# Patient Record
Sex: FEMALE | Race: AMERICAN INDIAN OR ALASKA NATIVE | NOT HISPANIC OR LATINO | Employment: UNEMPLOYED | URBAN - METROPOLITAN AREA
[De-identification: names, ages, dates, MRNs, and addresses within clinical notes are randomized per-mention and may not be internally consistent; named-entity substitution may affect disease eponyms.]

---

## 2017-01-08 ENCOUNTER — HOSPITAL ENCOUNTER (OUTPATIENT)
Facility: MEDICAL CENTER | Age: 32
End: 2017-01-08
Attending: OBSTETRICS & GYNECOLOGY | Admitting: OBSTETRICS & GYNECOLOGY
Payer: MEDICAID

## 2017-01-08 VITALS
SYSTOLIC BLOOD PRESSURE: 129 MMHG | HEART RATE: 82 BPM | WEIGHT: 203 LBS | BODY MASS INDEX: 30.77 KG/M2 | DIASTOLIC BLOOD PRESSURE: 67 MMHG | TEMPERATURE: 98 F | HEIGHT: 68 IN

## 2017-01-08 LAB — A1 MICROGLOB PLACENTAL VAG QL: NEGATIVE

## 2017-01-08 PROCEDURE — 59025 FETAL NON-STRESS TEST: CPT | Performed by: OBSTETRICS & GYNECOLOGY

## 2017-01-08 PROCEDURE — 84112 EVAL AMNIOTIC FLUID PROTEIN: CPT

## 2017-01-08 NOTE — PROGRESS NOTES
0024 32y/o  edc 17, EGA 36 , Here to l&d room LDA 3 with family. C/O ROM. EFM/TOCO applied, patients states positive fetal movement. Denies vaginal bleeding. Sterile vaginal exam performed, amnisure sent, SVE 1/thick, posterior.   0158 Dr Wright updated and orders received for discharge  0200 discharge orders received  0208 patient off floor stable on feet with family.

## 2017-01-20 ENCOUNTER — HOSPITAL ENCOUNTER (INPATIENT)
Facility: MEDICAL CENTER | Age: 32
LOS: 2 days | End: 2017-01-22
Attending: OBSTETRICS & GYNECOLOGY | Admitting: OBSTETRICS & GYNECOLOGY
Payer: COMMERCIAL

## 2017-01-20 LAB
BASOPHILS # BLD AUTO: 0.2 % (ref 0–1.8)
BASOPHILS # BLD: 0.02 K/UL (ref 0–0.12)
EOSINOPHIL # BLD AUTO: 0.12 K/UL (ref 0–0.51)
EOSINOPHIL NFR BLD: 1.4 % (ref 0–6.9)
ERYTHROCYTE [DISTWIDTH] IN BLOOD BY AUTOMATED COUNT: 45 FL (ref 35.9–50)
HCT VFR BLD AUTO: 38.3 % (ref 37–47)
HGB BLD-MCNC: 12.7 G/DL (ref 12–16)
HOLDING TUBE BB 8507: NORMAL
IMM GRANULOCYTES # BLD AUTO: 0.07 K/UL (ref 0–0.11)
IMM GRANULOCYTES NFR BLD AUTO: 0.8 % (ref 0–0.9)
LYMPHOCYTES # BLD AUTO: 2.51 K/UL (ref 1–4.8)
LYMPHOCYTES NFR BLD: 29.6 % (ref 22–41)
MCH RBC QN AUTO: 29.4 PG (ref 27–33)
MCHC RBC AUTO-ENTMCNC: 33.2 G/DL (ref 33.6–35)
MCV RBC AUTO: 88.7 FL (ref 81.4–97.8)
MONOCYTES # BLD AUTO: 0.57 K/UL (ref 0–0.85)
MONOCYTES NFR BLD AUTO: 6.7 % (ref 0–13.4)
NEUTROPHILS # BLD AUTO: 5.2 K/UL (ref 2–7.15)
NEUTROPHILS NFR BLD: 61.3 % (ref 44–72)
NRBC # BLD AUTO: 0 K/UL
NRBC BLD AUTO-RTO: 0 /100 WBC
PLATELET # BLD AUTO: 191 K/UL (ref 164–446)
PMV BLD AUTO: 10.7 FL (ref 9–12.9)
RBC # BLD AUTO: 4.32 M/UL (ref 4.2–5.4)
WBC # BLD AUTO: 8.5 K/UL (ref 4.8–10.8)

## 2017-01-20 PROCEDURE — 700101 HCHG RX REV CODE 250

## 2017-01-20 PROCEDURE — 303615 HCHG EPIDURAL/SPINAL ANESTHESIA FOR LABOR

## 2017-01-20 PROCEDURE — 700111 HCHG RX REV CODE 636 W/ 250 OVERRIDE (IP)

## 2017-01-20 PROCEDURE — 304965 HCHG RECOVERY SERVICES

## 2017-01-20 PROCEDURE — 10907ZC DRAINAGE OF AMNIOTIC FLUID, THERAPEUTIC FROM PRODUCTS OF CONCEPTION, VIA NATURAL OR ARTIFICIAL OPENING: ICD-10-PCS | Performed by: OBSTETRICS & GYNECOLOGY

## 2017-01-20 PROCEDURE — 10H07YZ INSERTION OF OTHER DEVICE INTO PRODUCTS OF CONCEPTION, VIA NATURAL OR ARTIFICIAL OPENING: ICD-10-PCS | Performed by: OBSTETRICS & GYNECOLOGY

## 2017-01-20 PROCEDURE — 59409 OBSTETRICAL CARE: CPT

## 2017-01-20 PROCEDURE — 85025 COMPLETE CBC W/AUTO DIFF WBC: CPT

## 2017-01-20 PROCEDURE — 700102 HCHG RX REV CODE 250 W/ 637 OVERRIDE(OP)

## 2017-01-20 PROCEDURE — 3E033VJ INTRODUCTION OF OTHER HORMONE INTO PERIPHERAL VEIN, PERCUTANEOUS APPROACH: ICD-10-PCS | Performed by: OBSTETRICS & GYNECOLOGY

## 2017-01-20 PROCEDURE — 770007 HCHG ROOM/CARE - OB SEMI PRIVATE (*

## 2017-01-20 PROCEDURE — 700111 HCHG RX REV CODE 636 W/ 250 OVERRIDE (IP): Performed by: OBSTETRICS & GYNECOLOGY

## 2017-01-20 PROCEDURE — A9270 NON-COVERED ITEM OR SERVICE: HCPCS

## 2017-01-20 PROCEDURE — 36415 COLL VENOUS BLD VENIPUNCTURE: CPT

## 2017-01-20 RX ORDER — MISOPROSTOL 200 UG/1
TABLET ORAL
Status: COMPLETED
Start: 2017-01-20 | End: 2017-01-20

## 2017-01-20 RX ORDER — ONDANSETRON 2 MG/ML
4 INJECTION INTRAMUSCULAR; INTRAVENOUS EVERY 6 HOURS PRN
Status: DISCONTINUED | OUTPATIENT
Start: 2017-01-20 | End: 2017-01-21

## 2017-01-20 RX ORDER — ROPIVACAINE HYDROCHLORIDE 2 MG/ML
INJECTION, SOLUTION EPIDURAL; INFILTRATION; PERINEURAL
Status: COMPLETED
Start: 2017-01-20 | End: 2017-01-20

## 2017-01-20 RX ORDER — MISOPROSTOL 200 UG/1
600 TABLET ORAL
Status: COMPLETED | OUTPATIENT
Start: 2017-01-20 | End: 2017-01-20

## 2017-01-20 RX ORDER — SODIUM CHLORIDE, SODIUM LACTATE, POTASSIUM CHLORIDE, CALCIUM CHLORIDE 600; 310; 30; 20 MG/100ML; MG/100ML; MG/100ML; MG/100ML
INJECTION, SOLUTION INTRAVENOUS CONTINUOUS
Status: DISPENSED | OUTPATIENT
Start: 2017-01-20 | End: 2017-01-20

## 2017-01-20 RX ORDER — ONDANSETRON 4 MG/1
4 TABLET, ORALLY DISINTEGRATING ORAL EVERY 6 HOURS PRN
Status: DISCONTINUED | OUTPATIENT
Start: 2017-01-20 | End: 2017-01-21

## 2017-01-20 RX ORDER — DEXTROSE, SODIUM CHLORIDE, SODIUM LACTATE, POTASSIUM CHLORIDE, AND CALCIUM CHLORIDE 5; .6; .31; .03; .02 G/100ML; G/100ML; G/100ML; G/100ML; G/100ML
INJECTION, SOLUTION INTRAVENOUS CONTINUOUS
Status: DISCONTINUED | OUTPATIENT
Start: 2017-01-21 | End: 2017-01-21

## 2017-01-20 RX ORDER — HYDROCODONE BITARTRATE AND ACETAMINOPHEN 5; 325 MG/1; MG/1
1 TABLET ORAL EVERY 4 HOURS PRN
Status: DISCONTINUED | OUTPATIENT
Start: 2017-01-20 | End: 2017-01-21

## 2017-01-20 RX ORDER — IBUPROFEN 600 MG/1
600 TABLET ORAL EVERY 6 HOURS PRN
Status: DISCONTINUED | OUTPATIENT
Start: 2017-01-20 | End: 2017-01-21

## 2017-01-20 RX ORDER — HYDROCODONE BITARTRATE AND ACETAMINOPHEN 10; 325 MG/1; MG/1
1 TABLET ORAL EVERY 4 HOURS PRN
Status: DISCONTINUED | OUTPATIENT
Start: 2017-01-20 | End: 2017-01-21

## 2017-01-20 RX ORDER — ACETAMINOPHEN 325 MG/1
325 TABLET ORAL EVERY 4 HOURS PRN
Status: DISCONTINUED | OUTPATIENT
Start: 2017-01-20 | End: 2017-01-21

## 2017-01-20 RX ADMIN — MISOPROSTOL 800 MCG: 200 TABLET ORAL at 23:00

## 2017-01-20 RX ADMIN — FENTANYL CITRATE 100 MCG: 50 INJECTION, SOLUTION INTRAMUSCULAR; INTRAVENOUS at 16:36

## 2017-01-20 RX ADMIN — Medication 125 ML/HR: at 23:20

## 2017-01-20 RX ADMIN — SODIUM CHLORIDE, SODIUM LACTATE, POTASSIUM CHLORIDE, CALCIUM CHLORIDE AND DEXTROSE MONOHYDRATE: 5; 600; 310; 30; 20 INJECTION, SOLUTION INTRAVENOUS at 15:57

## 2017-01-20 RX ADMIN — SODIUM CHLORIDE, POTASSIUM CHLORIDE, SODIUM LACTATE AND CALCIUM CHLORIDE: 600; 310; 30; 20 INJECTION, SOLUTION INTRAVENOUS at 16:31

## 2017-01-20 RX ADMIN — Medication 2000 ML/HR: at 22:45

## 2017-01-20 RX ADMIN — ROPIVACAINE HYDROCHLORIDE 200 MG: 2 INJECTION, SOLUTION EPIDURAL; INFILTRATION at 17:15

## 2017-01-20 RX ADMIN — SODIUM CHLORIDE, POTASSIUM CHLORIDE, SODIUM LACTATE AND CALCIUM CHLORIDE 1000 ML: 600; 310; 30; 20 INJECTION, SOLUTION INTRAVENOUS at 06:14

## 2017-01-20 RX ADMIN — FENTANYL CITRATE 100 MCG: 50 INJECTION, SOLUTION INTRAMUSCULAR; INTRAVENOUS at 15:10

## 2017-01-20 RX ADMIN — Medication 2 MILLI-UNITS/MIN: at 06:30

## 2017-01-20 RX ADMIN — SODIUM CHLORIDE, POTASSIUM CHLORIDE, SODIUM LACTATE AND CALCIUM CHLORIDE: 600; 310; 30; 20 INJECTION, SOLUTION INTRAVENOUS at 14:03

## 2017-01-20 ASSESSMENT — LIFESTYLE VARIABLES
EVER_SMOKED: YES
DO YOU DRINK ALCOHOL: NO
ALCOHOL_USE: NO

## 2017-01-20 NOTE — IP AVS SNAPSHOT
1/22/2017          Karin Richard  [address]  [place] [address]    Dear Karin:    [facility] wants to ensure your discharge home is safe and you or your loved ones have had all your questions answered regarding your care after you leave the hospital.    You may receive a telephone call within two days of your discharge.  This call is to make certain you understand your discharge instructions as well as ensure we provided you with the best care possible during your stay with us.     The call will only last approximately 3-5 minutes and will be done by a nurse.    Once again, we want to ensure your discharge home is safe and that you have a clear understanding of any next steps in your care.  If you have any questions or concerns, please do not hesitate to contact us, we are here for you.  Thank you for choosing [facility] for your healthcare needs.    Sincerely,    Silvestre Chiu    [facility]

## 2017-01-20 NOTE — IP AVS SNAPSHOT
Aston Club Access Code: [email or web address]  Expires: 2/10/2017  3:29 PM    Aston Club  A secure, online tool to manage your health information     [employer]’s Aston Club® is a secure, online tool that connects you to your personalized health information from the privacy of your home -- day or night - making it very easy for you to manage your healthcare. Once the activation process is completed, you can even access your medical information using the Aston Club demar, which is available for free in the Apple Demar store or Google Play store.     Aston Club provides the following levels of access (as shown below):   My Chart Features   [facility] Primary Care Doctor [facility]  Specialists [facility]  Urgent  Care Non-[facility]  Primary Care  Doctor   Email your healthcare team securely and privately 24/7 X X X X   Manage appointments: schedule your next appointment; view details of past/upcoming appointments X      Request prescription refills. X      View recent personal medical records, including lab and immunizations X X X X   View health record, including health history, allergies, medications X X X X   Read reports about your outpatient visits, procedures, consult and ER notes X X X X   See your discharge summary, which is a recap of your hospital and/or ER visit that includes your diagnosis, lab results, and care plan. X X       How to register for Aston Club:  1. Go to  [email or web address].  2. Click on the Sign Up Now box, which takes you to the New Member Sign Up page. You will need to provide the following information:  a. Enter your Aston Club Access Code exactly as it appears at the top of this page. (You will not need to use this code after you’ve completed the sign-up process. If you do not sign up before the expiration date, you must request a new code.)   b. Enter your date of birth.   c. Enter your home email address.   d. Click Submit, and follow the next screen’s instructions.  3. Create a Aston Club ID. This will be your [employer]  login ID and cannot be changed, so think of one that is secure and easy to remember.  4. Create a abeo password. You can change your password at any time.  5. Enter your Password Reset Question and Answer. This can be used at a later time if you forget your password.   6. Enter your e-mail address. This allows you to receive e-mail notifications when new information is available in abeo.  7. Click Sign Up. You can now view your health information.    For assistance activating your abeo account, call [phone]

## 2017-01-20 NOTE — PROGRESS NOTES
0545: Pt presents to L&D for IOL.  External monitors x2 applied.  SVE 2/50/-2  0615: IV started, labs drawn and sent.    0655: Report given to Carli, RN and Kim student RN at bedside.

## 2017-01-20 NOTE — H&P
Department of Obstetrics and Gynecology  Labor and Delivery History and Physical    Date of Admission: 2017     ID: 32 y.o.  with IUP at 38+2.    Primary OB: Buck Wright M.D.     Attending OB: Buck Wright M.D.    CC: IOL for oligohydramnios    HPI: Karin Richard is a 32 y.o.  at 38+2 on date of admission by 10 week ultrasound, who presents for IOL for oligohydramnios.  She was found yesterday to have a 10/10 BPP, but was found to have an JACKLYN that was 4-6 cm.  Given the that some measurements are consistent with oligohydramnios, it was recommended that she move forward with IOL.  She was started on oxytocin earlier this am and is starting to feel CTX regularlly    ROS: 10 systems reviewed and negative except as noted above.    Obstetric History      x 4.  Pt developed kidney dz with her second delivery that resolved thereafter.          Past Medical History  Surgical History   Negative except for Gyn as below   LEEP      Gynecologic History  Social History   Irregular menses prior to pregnancy  + Hx of abnormal pap smears with cervical dysplasia requiring LEEP.  Pap and HPV negative at the beginning of this pregnancy.  + Hx of STIs: chlamydia, gonorrhea, & HPV Tobacco: denies  EtOH: denies  Street Drugs: denies      Medications  Allergies   No current facility-administered medications on file prior to encounter.     Current Outpatient Prescriptions on File Prior to Encounter   Medication Sig Dispense Refill   • hydrocodone-acetaminophen (NORCO) 5-325 MG Tab per tablet Take 1 Tab by mouth every 6 hours as needed. 16 Tab 0   • mupirocin (BACTROBAN) 2 % Ointment Apply to the affecte area twice daily for 7 days. 1 Tube 0   • Prenatal MV-Min-Fe Fum-FA-DHA (PRENATAL 1 PO) Take  by mouth.     • cyclobenzaprine (FLEXERIL) 10 MG TABS Take 10 mg by mouth 3 times a day as needed.     • ibuprofen (MOTRIN) 600 MG TABS Take 600 mg by mouth every 6 hours as needed.     •  "oxycodone-acetaminophen (PERCOCET) 5-325 MG TABS Take 1-2 Tabs by mouth every 6 hours as needed (pain). 20 Each 0   • lorazepam (ATIVAN) 0.5 MG TABS Take 1 Tab by mouth every four hours as needed (muscle spasms). 20 Tab 0   • amoxicillin (AMOXIL) 500 MG CAPS Take 500 mg by mouth 3 times a day.     • Non Formulary Request Depoprovera injection every 3 months      Nkda       O: /74 mmHg  Pulse 78  Temp(Src) 36.4 °C (97.5 °F) (Temporal)  Ht 1.702 m (5' 7\")  Wt 102.513 kg (226 lb)  BMI 35.39 kg/m2  LMP 2014    Gen: NAD, AAO  Abd: Gravid, NTTP,Cephalic by [place], No rebound or guarding  Ext: NTTP, no edema, 2+DPP  Pelvic: SVE 2/50/-2, AROM thin meconium    FHT:  125/mod allyson/+accels  [place]: CTX q3min    Labs:   CBC    2017 06:15   WBC 8.5   RBC 4.32   Hemoglobin 12.7   Hematocrit 38.3   MCV 88.7   MCH 29.4   MCHC 33.2 (L)   RDW 45.0   Platelet Count 191     Prenatal labs:  Rh+, ABS negative, RubImm, HBsAg, HIV NR, RPR NR, VarImm.  First trimester screen negative.  AFP negative. 1h glucose 174, abn.  1 abnormal value of 3h GTT.  GBS neg.          A/P: Karin Richard is a 32 y.o.  at 38+2 by 10wk U/S who presents with for IOL for oligohydramnios.  AVSS.  Cat I FHT.  *Admit to L&D  *IV, CBC, T&S on hold  *Oligohydramnios/IOL: pt with JACKLYN 4-6 on U/S.  IOL for same.  Reassuring FHT currently.  CTX regularly with oxytocin, AROM now for light meconium.    *FWB: Reassuring, reactive, Cat I FHT.  CEFM.    *Pain: fentanyl and epidural if decided  *Global: Rh+, RubImm, VarImm, GBS neg.    - Annette Wright M.D.,  2017      ADDENDUM:   Fetus was seen to have mild LEFT pyelectasis on last U/S yesterday was 7.4mm (normal up to 7mm).  A  renal ultrasound is recommended.     Buck Wright MD, MS,  2017, 10:58 PM    "

## 2017-01-20 NOTE — IP AVS SNAPSHOT
After Visit Summary                                                                                                                Karin Richard   MRN: [id number]    Department:  POST PARTUM 31   2017           Follow-up Information     1. Follow up with Buck Wright M.D.. Schedule an appointment as soon as possible for a visit in 6 weeks.    Specialty:  OB/Gyn    Why:  Follow up in 6 weeks    Contact information    [address]  [place] [address]  [phone]         I assume responsibility for securing a follow-up [place] Screening blood test on my baby within the specified date range.    -                  Discharge Instructions       POSTPARTUM DISCHARGE INSTRUCTIONS FOR MOM    YOB: 1985   Age: 32 y.o.               Admit Date: 2017     Discharge Date: 2017  Attending Doctor:  Buck Wright M.D.                  Allergies:  Nkda    Discharged to home by car. Discharged via wheelchair, hospital escort: Yes.  Special equipment needed: Not Applicable  Belongings with: Personal  Be sure to schedule a follow-up appointment with your primary care doctor or any specialists as instructed.     Discharge Plan:   Influenza Vaccine Indication: Not indicated: Previously immunized this influenza season and > 8 years of age    REASONS TO CALL YOUR OBSTETRICIAN:  1.   Persistent fever or shaking chills (Temperature higher than 100.4)  2.   Heavy bleeding (soaking more than 1 pad per hour); Passing clots  3.   Foul odor from vagina  4.   Mastitis (Breast infection; breast pain, chills, fever, redness)  5.   Urinary pain, burning or frequency  6.   Episiotomy infection  7.   Abdominal incision infection  8.   Severe depression longer than 24 hours    HAND WASHING  · Prior to handling the baby.  · Before breastfeeding or bottle feeding baby.  · After using the bathroom or changing the baby's diaper.    WOUND CARE  Ask your physician for additional care instructions.  In  "general:    ·  Incision:      · Keep clean and dry.    · Do NOT lift anything heavier than your baby for up to 6 weeks.    · There should not be any opening or pus.      VAGINAL CARE  · Nothing inside vagina for 6 weeks: no sexual intercourse, tampons or douching.  · Bleeding may continue for 2-4 weeks.  Amount may vary.    · Call your physician for heavy bleeding which means soaking more than 1 pad per hour    BIRTH CONTROL  · It is possible to become pregnant at any time after delivery and while breastfeeding.  · Plan to discuss a method of birth control with your physician at your follow up visit. visit.    DIET AND ELIMINATION  · Eating more fiber (bran cereal, fruits, and vegetables) and drinking plenty of fluids will help to avoid constipation.  · Urinary frequency after childbirth is normal.    POSTPARTUM BLUES  During the first few days after birth, you may experience a sense of the \"blues\" which may include impatience, irritability or even crying.  These feeling come and go quickly.  However, as many as 1 in 10 women experience emotional symptoms known as postpartum depression.    Postpartum depression:  May start as early as the second or third day after delivery or take several weeks or months to develop.  Symptoms of \"blues\" are present, but are more intense:  Crying spells; loss of appetite; feelings of hopelessness or loss of control; fear of touching the baby; over concern or no concern at all about the baby; little or no concern about your own appearance/caring for yourself; and/or inability to sleep or excessive sleeping.  Contact your physician if you are experiencing any of these symptoms.    Crisis Hotline:  · [facility] Crisis Hotline:  7-442-QRZCTBA  Or 1-902.727.5564  · [place] Crisis Hotline:  1-916.468.9132  Or 342-774-8215    PREVENTING SHAKEN BABY:  If you are angry or stressed, PUT THE BABY IN THE CRIB, step away, take some deep breaths, and wait until you are calm to care for the " "baby.  DO NOT SHAKE THE BABY.  You are not alone, call a supporter for help.    · Crisis Call Center 24/7 crisis line 018-102-3426 or 1-334.769.8199  · You can also text them, text \"ANSWER\" to 392661    QUIT SMOKING/TOBACCO USE:  I understand the use of any tobacco products increases my chance of suffering from future heart disease and could cause other illnesses which may shorten my life. Quitting the use of tobacco products is the single most important thing I can do to improve my health. For further information on smoking / tobacco cessation call a Toll Free Quit Line at 1-933.492.2492 (*National Cancer Terral) or 1-811.923.4506 (American Lung Association) or you can access the web based program at www.lungusa.org.    · [place] Tobacco Users Help Line:  (807) 344-9589       Toll Free: 1-808.829.4723  · Quit Tobacco Program [facility] (021)544-3156    DEPRESSION / SUICIDE RISK:  As you are discharged from this [facility], it is important to learn how to keep safe from harming yourself.    Recognize the warning signs:  · Abrupt changes in personality, positive or negative- including increase in energy   · Giving away possessions  · Change in eating patterns- significant weight changes-  positive or negative  · Change in sleeping patterns- unable to sleep or sleeping all the time   · Unwillingness or inability to communicate  · Depression  · Unusual sadness, discouragement and loneliness  · Talk of wanting to die  · Neglect of personal appearance   · Rebelliousness- reckless behavior  · Withdrawal from people/activities they love  · Confusion- inability to concentrate     If you or a loved one observes any of these behaviors or has concerns about self-harm, here's what you can do:  · Talk about it- your feelings and reasons for harming yourself  · Remove any means that you might use to hurt yourself (examples: pills, rope, extension cords, firearm)  · Get professional help from " the community (Mental Health, Substance Abuse, psychological counseling)  · Do not be alone:Call your Safe Contact- someone whom you trust who will be there for you.  · Call your local CRISIS HOTLINE 059-2965 or 706-100-4119  · Call your local Children's Mobile Crisis Response Team [place] (412) 748-9099 or www.Qazzow  · Call the toll free National Suicide Prevention Hotlines   · National Suicide Prevention Lifeline 053-151-BVDK (7838)  · National Hope Line Network 800-SUICIDE (424-6872)    DISCHARGE SURVEY:  Thank you for choosing [facility].  We hope we provided you with very good care.  You may be receiving a survey in the mail.  Please fill it out.  Your opinion is valuable to us.    ADDITIONAL EDUCATIONAL MATERIALS GIVEN TO PATIENT:        My signature on this form indicates that:  1.  I have reviewed and understand the above information  2.  My questions regarding this information have been answered to my satisfaction.  3.  I have formulated a plan with my discharge nurse to obtain my prescribed medication for home.         Discharge Medication Instructions:    Below are the medications your physician expects you to take upon discharge:    Review all your home medications and newly ordered medications with your doctor and/or pharmacist. Follow medication instructions as directed by your doctor and/or pharmacist.    Please keep your medication list with you and share with your physician.               Medication List      CHANGE how you take these medications        Instructions    hydrocodone-acetaminophen 5-325 MG Tabs per tablet   What changed:  how much to take   Last time this was given:  1 Tab on 1/22/2017  7:19 AM   Commonly known as:  NORCO    Take 1-2 Tabs by mouth every 6 hours as needed.   Dose:  1-2 Tab       ibuprofen 600 MG Tabs   What changed:  reasons to take this   Last time this was given:  600 mg on 1/22/2017  7:20 AM   Commonly known as:  MOTRIN    Take 1 Tab by mouth  every 6 hours as needed (Cramping).   Dose:  600 mg         CONTINUE taking these medications        Instructions    PRENATAL 1 PO    Take  by mouth.         STOP taking these medications     amoxicillin 500 MG Caps   Commonly known as:  AMOXIL       cyclobenzaprine 10 MG Tabs   Commonly known as:  FLEXERIL       lorazepam 0.5 MG Tabs   Commonly known as:  ATIVAN       mupirocin 2 % Oint   Commonly known as:  BACTROBAN       Non Formulary Request       oxycodone-acetaminophen 5-325 MG Tabs   Commonly known as:  PERCOCET               Crisis Hotline:     [place] Crisis Hotline:  6-641-FXRVKEN or 1-562.553.9342    [place] Crisis Hotline:    1-945.681.2751 or 174-754-0074        Disclaimer           _____________________________________                     __________       ________       Patient/Mother Signature or Legal                          Date                   Time

## 2017-01-21 LAB
ERYTHROCYTE [DISTWIDTH] IN BLOOD BY AUTOMATED COUNT: 45.9 FL (ref 35.9–50)
HCT VFR BLD AUTO: 33.8 % (ref 37–47)
HGB BLD-MCNC: 11.5 G/DL (ref 12–16)
MCH RBC QN AUTO: 30.4 PG (ref 27–33)
MCHC RBC AUTO-ENTMCNC: 34 G/DL (ref 33.6–35)
MCV RBC AUTO: 89.4 FL (ref 81.4–97.8)
PLATELET # BLD AUTO: 146 K/UL (ref 164–446)
PMV BLD AUTO: 10.3 FL (ref 9–12.9)
RBC # BLD AUTO: 3.78 M/UL (ref 4.2–5.4)
WBC # BLD AUTO: 13.6 K/UL (ref 4.8–10.8)

## 2017-01-21 PROCEDURE — 700111 HCHG RX REV CODE 636 W/ 250 OVERRIDE (IP): Performed by: OBSTETRICS & GYNECOLOGY

## 2017-01-21 PROCEDURE — 770007 HCHG ROOM/CARE - OB SEMI PRIVATE (*

## 2017-01-21 PROCEDURE — 36415 COLL VENOUS BLD VENIPUNCTURE: CPT

## 2017-01-21 PROCEDURE — 85027 COMPLETE CBC AUTOMATED: CPT

## 2017-01-21 PROCEDURE — 700102 HCHG RX REV CODE 250 W/ 637 OVERRIDE(OP): Performed by: OBSTETRICS & GYNECOLOGY

## 2017-01-21 PROCEDURE — A9270 NON-COVERED ITEM OR SERVICE: HCPCS | Performed by: OBSTETRICS & GYNECOLOGY

## 2017-01-21 RX ORDER — ONDANSETRON 2 MG/ML
4 INJECTION INTRAMUSCULAR; INTRAVENOUS EVERY 6 HOURS PRN
Status: DISCONTINUED | OUTPATIENT
Start: 2017-01-21 | End: 2017-01-22 | Stop reason: HOSPADM

## 2017-01-21 RX ORDER — DOCUSATE SODIUM 100 MG/1
100 CAPSULE, LIQUID FILLED ORAL 2 TIMES DAILY PRN
Status: DISCONTINUED | OUTPATIENT
Start: 2017-01-21 | End: 2017-01-22 | Stop reason: HOSPADM

## 2017-01-21 RX ORDER — HYDROCODONE BITARTRATE AND ACETAMINOPHEN 5; 325 MG/1; MG/1
2 TABLET ORAL EVERY 4 HOURS PRN
Status: DISCONTINUED | OUTPATIENT
Start: 2017-01-21 | End: 2017-01-22 | Stop reason: HOSPADM

## 2017-01-21 RX ORDER — HYDROCODONE BITARTRATE AND ACETAMINOPHEN 5; 325 MG/1; MG/1
1 TABLET ORAL EVERY 4 HOURS PRN
Status: DISCONTINUED | OUTPATIENT
Start: 2017-01-21 | End: 2017-01-22 | Stop reason: HOSPADM

## 2017-01-21 RX ORDER — IBUPROFEN 600 MG/1
600 TABLET ORAL EVERY 6 HOURS PRN
Status: DISCONTINUED | OUTPATIENT
Start: 2017-01-21 | End: 2017-01-22 | Stop reason: HOSPADM

## 2017-01-21 RX ORDER — ACETAMINOPHEN 325 MG/1
650 TABLET ORAL EVERY 4 HOURS PRN
Status: DISCONTINUED | OUTPATIENT
Start: 2017-01-21 | End: 2017-01-22 | Stop reason: HOSPADM

## 2017-01-21 RX ORDER — BISACODYL 10 MG
10 SUPPOSITORY, RECTAL RECTAL PRN
Status: DISCONTINUED | OUTPATIENT
Start: 2017-01-21 | End: 2017-01-22 | Stop reason: HOSPADM

## 2017-01-21 RX ORDER — MISOPROSTOL 200 UG/1
800 TABLET ORAL PRN
Status: DISCONTINUED | OUTPATIENT
Start: 2017-01-21 | End: 2017-01-22 | Stop reason: HOSPADM

## 2017-01-21 RX ORDER — ACETAMINOPHEN 325 MG/1
325 TABLET ORAL EVERY 4 HOURS PRN
Status: DISCONTINUED | OUTPATIENT
Start: 2017-01-21 | End: 2017-01-22 | Stop reason: HOSPADM

## 2017-01-21 RX ORDER — VITAMIN A ACETATE, BETA CAROTENE, ASCORBIC ACID, CHOLECALCIFEROL, .ALPHA.-TOCOPHEROL ACETATE, DL-, THIAMINE MONONITRATE, RIBOFLAVIN, NIACINAMIDE, PYRIDOXINE HYDROCHLORIDE, FOLIC ACID, CYANOCOBALAMIN, CALCIUM CARBONATE, FERROUS FUMARATE, ZINC OXIDE, CUPRIC OXIDE 3080; 12; 120; 400; 1; 1.84; 3; 20; 22; 920; 25; 200; 27; 10; 2 [IU]/1; UG/1; MG/1; [IU]/1; MG/1; MG/1; MG/1; MG/1; MG/1; [IU]/1; MG/1; MG/1; MG/1; MG/1; MG/1
1 TABLET, FILM COATED ORAL EVERY MORNING
Status: DISCONTINUED | OUTPATIENT
Start: 2017-01-22 | End: 2017-01-22 | Stop reason: HOSPADM

## 2017-01-21 RX ADMIN — IBUPROFEN 600 MG: 600 TABLET, FILM COATED ORAL at 14:17

## 2017-01-21 RX ADMIN — IBUPROFEN 600 MG: 600 TABLET, FILM COATED ORAL at 00:20

## 2017-01-21 RX ADMIN — HYDROCODONE BITARTRATE AND ACETAMINOPHEN 1 TABLET: 5; 325 TABLET ORAL at 13:06

## 2017-01-21 RX ADMIN — HYDROCODONE BITARTRATE AND ACETAMINOPHEN 1 TABLET: 5; 325 TABLET ORAL at 17:15

## 2017-01-21 RX ADMIN — IBUPROFEN 600 MG: 600 TABLET, FILM COATED ORAL at 08:00

## 2017-01-21 RX ADMIN — HYDROCODONE BITARTRATE AND ACETAMINOPHEN 1 TABLET: 5; 325 TABLET ORAL at 01:31

## 2017-01-21 RX ADMIN — HYDROCODONE BITARTRATE AND ACETAMINOPHEN 1 TABLET: 5; 325 TABLET ORAL at 08:00

## 2017-01-21 RX ADMIN — IBUPROFEN 600 MG: 600 TABLET, FILM COATED ORAL at 20:57

## 2017-01-21 RX ADMIN — Medication 125 ML/HR: at 00:20

## 2017-01-21 RX ADMIN — HYDROCODONE BITARTRATE AND ACETAMINOPHEN 1 TABLET: 5; 325 TABLET ORAL at 20:57

## 2017-01-21 ASSESSMENT — PAIN SCALES - GENERAL
PAINLEVEL_OUTOF10: 2
PAINLEVEL_OUTOF10: 5
PAINLEVEL_OUTOF10: 3
PAINLEVEL_OUTOF10: 5
PAINLEVEL_OUTOF10: 0
PAINLEVEL_OUTOF10: 1
PAINLEVEL_OUTOF10: 5
PAINLEVEL_OUTOF10: 4
PAINLEVEL_OUTOF10: 1
PAINLEVEL_OUTOF10: 4

## 2017-01-21 NOTE — CARE PLAN
Problem: Pain  Goal: Alleviation of Pain or a reduction in pain to the patient’s comfort goal  Outcome: PROGRESSING AS EXPECTED  Reports adequate pain relief with epidural analgesia.     Problem: Risk for Fluid Imbalance  Goal: Promotion of Fluid Balance  Outcome: PROGRESSING AS EXPECTED  VSS, no s/s of infection noted upon assessment.

## 2017-01-21 NOTE — CARE PLAN
Problem: Altered physiologic condition related to immediate post-delivery state and potential for bleeding/hemorrhage  Goal: Patient physiologically stable as evidenced by normal lochia, palpable uterine involution and vital signs within normal limits  Outcome: PROGRESSING AS EXPECTED  Assessment WNL. VSS.     Problem: Alteration in comfort related to episiotomy, vaginal repair and/or after birth pains  Goal: Patient is able to ambulate, care for self and infant  Outcome: PROGRESSING AS EXPECTED  Pt cites adequate relief of pain with pain medication provided. Will continue to monitor for pain throughout this noc shift. Pt will call for pain medication. Pt verbalizes understanding.

## 2017-01-21 NOTE — PROGRESS NOTES
Dr. Wright notified regarding pt's temp of 100.7 MD ordered to recheck temp one hour after initial temp was taken. Call MD back if pt continues to be febrile.

## 2017-01-21 NOTE — PROGRESS NOTES
"Department of Obstetrics and Gynecology  Labor and Delivery Progress Note    ID: 32 y.o.  at 38+2, oligohydramnios    S: Pt feeling more uncomfortable with CTX.      O: /65 mmHg  Pulse 74  Temp(Src) 36.8 °C (98.3 °F) (Temporal)  Resp 18  Ht 1.702 m (5' 7\")  Wt 102.513 kg (226 lb)  BMI 35.39 kg/m2  LMP 2014   FHT: 125/mod allyson/+accels, -decels  [place]: q2min  SVE: 3/50/-2    Oxytocin:  10 miliunits per min    A/P: Karin Richard is a 32 y.o.  at 38+2 by 10wk U/S who presents with for IOL for oligohydramnios. AVSS. Cat I FHT.  *Oligohydramnios/IOL: pt with JACKLYN 4-6 on U/S.  IOL for same.  Reassuring FHT currently.  CTX regularly with oxytocin, AROM.  IUPC for CTX monitoring and titration.  Oxytocin per protocol.     *FWB: Reassuring, reactive, Cat I FHT.  CEFM.     *Pain: fentanyl and epidural if decided  *Global: Rh+, RubImm, VarImm, GBS neg.                - Annette Wright M.D., 2017    "

## 2017-01-21 NOTE — CARE PLAN
Problem: Safety  Goal: Will remain free from injury  Outcome: PROGRESSING AS EXPECTED  Pt has been steady on her feet while ambulating.

## 2017-01-21 NOTE — PROGRESS NOTES
Pt admitted from L&D to PP at 0110. Pt assessed at 0115. Assessment WNL. Pt febrile at 100.7. Pt oriented to unit and routine. Pt encouraged to call with needs. Reviewed plan of care. Pt bonding well with infant.

## 2017-01-21 NOTE — DELIVERY NOTE
DATE OF SERVICE:  01/20/2017    LABOR AND DELIVERY SPONTANEOUS VAGINAL DELIVERY PROCEDURE NOTE    PRIMARY AND DELIVERING OBSTETRICIAN PHYSICIAN:  Buck Wright MD    PROCEDURES PERFORMED:  Normal spontaneous vaginal delivery.    PREPROCEDURE DIAGNOSES:  1.  A 32-year-old G5, P4-0-0-4 with intrauterine pregnancy at 38 weeks 2 days   gestational age.  2.  Oligohydramnios.  3.  Induction of labor for the same.  4.  Left fetal pyelectasis (7.4 mm).    POSTPROCEDURE DIAGNOSES:  1.  A 32-year-old G5, P4-0-0-4 with intrauterine pregnancy at 38 weeks 2 days   gestational age.  2.  Oligohydramnios.  3.  Induction of labor for the same.  4.  Postpartum status post normal spontaneous vaginal delivery.  5.  Left fetal pyelectasis (7.4 mm).    ANESTHESIA:  Epidural.    ANESTHESIOLOGIST:  Lex Winters MD    FINDINGS:  1.  Viable male infant in direct OA position delivered at 22:42 on 01/20/2017.  2.  Weight 3775 g (8 pounds 5 ounces).  3.  Apgars 8 at 1 minute and 8 at 5 minutes.  4.  Thin meconium stained amniotic fluid.  5.  Intact, normal-appearing placenta with 3-vessel cord and central cord   insertion.  6.  No obstetrical lacerations.    COMPLICATIONS:  No complications.    ESTIMATED BLOOD LOSS:  350 mL.    NARRATIVE:  This is a 32-year-old G5, P4-0-0-4 with intrauterine pregnancy at   38 weeks 2 days gestational age, presented to labor and delivery for induction   of labor for oligohydramnios, which was identified the evening prior.  She   was found to have an JACKLYN that measured 4 cm to 6 cm on an ultrasound   performed.  The ultrasound was performed for a history of fetal pyelectasis   was performed as she was being monitored for fetal pyelectasis.  She underwent   a biophysical profile, which was 10/10, however, the measurements of her JACKLYN   ranged from 4 cm to 6 cm.  Given that some measurements of her JACKLYN were in the   oligohydramnios range and she was already 38 weeks, I recommended induction   of labor.  The  risks, benefits, alternatives of this were discussed with the   patient.  Understanding the same she wished to go forward with that procedure.    She arrived on labor and delivery was administered oxytocin to begin   contractions.  She underwent artificial rupture of membranes at 2 cm dilation.    She made slow progress through the latent phase of labor, but upon reaching   the active phase moved along a normal labor curve to complete dilation.  The   patient felt the urge to push and I was called for delivery.  By the time we   were set up for delivery.  The fetal head was beginning to crown.  The patient   then pushed over the next contraction until delivery of the fetal head, which   occurred atraumatically.  The head was guided out gently without traction.    The remainder of the infant also delivered atraumatically.  Given the thin   meconium, the cord was doubly clamped and cut and the infant was handed off to   the awaiting resuscitation team, which included a respiratory therapist.  The   infant was noted to be immediately vigorous and moving all extremities   equally.  The placenta then delivered quickly thereafter.  A survey of the   patient's perineum, vulva and vagina revealed the above findings.  .  There   were no obstetrical lacerations.  The patient did have approximately 2 golf   ball size clots that delivered thereafter.  She was started on oxytocin for   active management of third stage of labor and fundal massage was performed.    Given her level of bleeding and these clots we did administer 800 mcg of misoprostol   to increase uterine tone.  At this time, she did not qualify for postpartum   hemorrhage though.  The patient tolerated the procedure well.  There were no   complications.  Sponge and needle counts were correct at the end of the   procedure.  The patient and her infant remained in her birthing room before   later transfer to maternity.  Dr. Wright was present throughout and performed    the entire procedure.    COMPLICATIONS:  None.    CONDITION:  Good.    DISPOSITION:  Birthing room then maternity.       ____________________________________     MD GLENDY Li / FRANCISCO    DD:  01/21/2017 00:31:27  DT:  01/21/2017 04:45:19    D#:  [id number]  Job#:  [id number]

## 2017-01-21 NOTE — PROGRESS NOTES
"Department of Obstetrics and Gynecology  Labor and Delivery Progress Note    ID: 32 y.o.  at 38+2, oligohydramnios    S: Comfy with epidural.        O: /59 mmHg  Pulse 80  Temp(Src) 37.3 °C (99.1 °F) (Temporal)  Resp 18  Ht 1.702 m (5' 7\")  Wt 102.513 kg (226 lb)  BMI 35.39 kg/m2  SpO2 97%  LMP 2014   FHT: 125/mod allyson/+accels, -decels  IUPC: q2-3min,   SVE: 5/80/-1 per S. Threats RN    Oxytocin:  16 miliunits per min    A/P: Karin Richard is a 32 y.o.  at 38+2 by 10wk U/S who presents with for IOL for oligohydramnios. AVSS. Cat I FHT.  *Oligohydramnios/IOL: pt with JACKLYN 4-6 on U/S.  IOL for same.  Reassuring FHT currently.  CTX regularly with oxytocin, AROM.  IUPC for CTX monitoring and titration.  Oxytocin per protocol.     *FWB: Reassuring, reactive, Cat I FHT.  CEFM.     *Pain: fentanyl and epidural if decided  *Global: Rh+, RubImm, VarImm, GBS neg.                - Annette Wright M.D., 2017    "

## 2017-01-21 NOTE — PROGRESS NOTES
"Department of Obstetrics and Gynecology  Postpartum Progress Note    CC: PPD1 s/p  after IOL for oligohydramnios    S: Pt feeling well.  Pain well controlled.  Mahi reg diet.  Has ambulated.  Lochia mild. Voiding w/o difficulty.  +flatus/+BM.  Breastfeeding going well.  Pt denies CP/SOB, N/V, constipation/diarrhea, lower leg pain.      O: /68 mmHg  Pulse 104  Temp(Src) 37.3 °C (99.1 °F) (Temporal)  Resp 19  Ht 1.702 m (5' 7\")  Wt 102.513 kg (226 lb)  BMI 35.39 kg/m2  SpO2 92%  LMP 2014, Temp (24hrs), Av.3 °C (99.2 °F), Min:36.8 °C (98.3 °F), Max:38.2 °C (100.7 °F)       Gen: NAD, AAO    CV: RRR    Pulm: unlabored    Abd: Soft, NT, no rebound/guarding, fundus firm at U-1.    Ext: WWP, no edema, non-tender to palpation    Labs:  am CBC pending    A/P: Karin Richard is a 32 y.o.  s/p .  AVSS.  Recovering well.    - Continue routine postpartum care.    - Encourage ambulation.    - One elevated temp to 100.7 with spontaneous defervescence.  Feel likely pyrexia from misoprostol, will monitor.  If further temp would consider abx tx.    - Continue current pain regimen    - Rh +.  Rhogam NOT indicated.    - Rubella Imm.  Varicella Imm    Anticipate d/c tomorrow    Buck Wright MD, MS,  2017, 7:49 AM    "

## 2017-01-21 NOTE — PROGRESS NOTES
190 - report from GALILEO Nichols RN.  Family x3 and pt's two children at bedside. POC discussed, questions encouraged and answered, understanding verbalized. Assessment done, WDL, /-1. Will continue to monitor.    - Dr. Wright on unit, update given.    - reporting pressure, SVE 7/90/-1.    - reporting constant pressure, SVE complete/+3.    -  of viable male infant, 8/8 apgars, 3VC.   2244 - spontaneous delivery of intact placenta.   0050 - up to bathroom with assistance from RN, successful void, tolerated well. Belen care provided and taught, understanding verbalized.   0100 - transferred to postpartum via wheelchair in stable condition with infant in arms. Report to KENDY Murdock.

## 2017-01-21 NOTE — PROGRESS NOTES
"FOB here and in room with patient. Asked to assess pt. FOB left room. Asked pt if she wanted the FOB here now and she stated yes. She did not want him here yesterday \"while she was in labor, and in pain. We are going through a separation right now.\" I asked her if she is safe and feels safe. She stated yes and no longer wants the FOB not to be able to come in. She wants him to be able to be at the bedside and states she no longer wants a security code for him.   "

## 2017-01-21 NOTE — CARE PLAN
Problem: Infection  Goal: Will remain free from infection  Outcome: PROGRESSING AS EXPECTED  Patients vital signs have been stable.

## 2017-01-22 VITALS
SYSTOLIC BLOOD PRESSURE: 115 MMHG | DIASTOLIC BLOOD PRESSURE: 67 MMHG | HEIGHT: 67 IN | OXYGEN SATURATION: 95 % | BODY MASS INDEX: 35.47 KG/M2 | RESPIRATION RATE: 16 BRPM | WEIGHT: 226 LBS | HEART RATE: 64 BPM | TEMPERATURE: 97.2 F

## 2017-01-22 PROCEDURE — 90471 IMMUNIZATION ADMIN: CPT

## 2017-01-22 PROCEDURE — 3E0234Z INTRODUCTION OF SERUM, TOXOID AND VACCINE INTO MUSCLE, PERCUTANEOUS APPROACH: ICD-10-PCS | Performed by: OBSTETRICS & GYNECOLOGY

## 2017-01-22 PROCEDURE — 700112 HCHG RX REV CODE 229

## 2017-01-22 PROCEDURE — 700102 HCHG RX REV CODE 250 W/ 637 OVERRIDE(OP): Performed by: OBSTETRICS & GYNECOLOGY

## 2017-01-22 PROCEDURE — 90715 TDAP VACCINE 7 YRS/> IM: CPT

## 2017-01-22 PROCEDURE — A9270 NON-COVERED ITEM OR SERVICE: HCPCS | Performed by: OBSTETRICS & GYNECOLOGY

## 2017-01-22 RX ORDER — IBUPROFEN 600 MG/1
600 TABLET ORAL EVERY 6 HOURS PRN
Qty: 30 TAB | Refills: 1 | Status: SHIPPED | OUTPATIENT
Start: 2017-01-22 | End: 2017-07-12

## 2017-01-22 RX ORDER — HYDROCODONE BITARTRATE AND ACETAMINOPHEN 5; 325 MG/1; MG/1
1-2 TABLET ORAL EVERY 6 HOURS PRN
Qty: 15 TAB | Refills: 0 | Status: SHIPPED | OUTPATIENT
Start: 2017-01-22 | End: 2017-07-12

## 2017-01-22 RX ADMIN — HYDROCODONE BITARTRATE AND ACETAMINOPHEN 1 TABLET: 5; 325 TABLET ORAL at 07:19

## 2017-01-22 RX ADMIN — IBUPROFEN 600 MG: 600 TABLET, FILM COATED ORAL at 07:20

## 2017-01-22 RX ADMIN — TETANUS TOXOID, REDUCED DIPHTHERIA TOXOID AND ACELLULAR PERTUSSIS VACCINE, ADSORBED 0.5 ML: 5; 2.5; 8; 8; 2.5 SUSPENSION INTRAMUSCULAR at 13:21

## 2017-01-22 RX ADMIN — IBUPROFEN 600 MG: 600 TABLET, FILM COATED ORAL at 14:37

## 2017-01-22 RX ADMIN — Medication 1 TABLET: at 07:20

## 2017-01-22 RX ADMIN — HYDROCODONE BITARTRATE AND ACETAMINOPHEN 1 TABLET: 5; 325 TABLET ORAL at 14:37

## 2017-01-22 ASSESSMENT — PAIN SCALES - GENERAL
PAINLEVEL_OUTOF10: 6
PAINLEVEL_OUTOF10: 4
PAINLEVEL_OUTOF10: 0
PAINLEVEL_OUTOF10: 0

## 2017-01-22 ASSESSMENT — COPD QUESTIONNAIRES
DO YOU EVER COUGH UP ANY MUCUS OR PHLEGM?: NO/ONLY WITH OCCASIONAL COLDS OR INFECTIONS
HAVE YOU SMOKED AT LEAST 100 CIGARETTES IN YOUR ENTIRE LIFE: NO/DON'T KNOW
DURING THE PAST 4 WEEKS HOW MUCH DID YOU FEEL SHORT OF BREATH: NONE/LITTLE OF THE TIME
COPD SCREENING SCORE: 0

## 2017-01-22 ASSESSMENT — PATIENT HEALTH QUESTIONNAIRE - PHQ9
SUM OF ALL RESPONSES TO PHQ9 QUESTIONS 1 AND 2: 0
2. FEELING DOWN, DEPRESSED, IRRITABLE, OR HOPELESS: NOT AT ALL
SUM OF ALL RESPONSES TO PHQ QUESTIONS 1-9: 0
1. LITTLE INTEREST OR PLEASURE IN DOING THINGS: NOT AT ALL

## 2017-01-22 NOTE — CARE PLAN
Problem: Potential for postpartum infection related to presence of episiotomy/vaginal tear and/or uterine contamination  Goal: Patient will be absent from signs and symptoms of infection  Patient has been absent from signs or symptoms of infections.     Problem: Potential knowledge deficit related to lack of understanding of self and  care  Goal: Patient will demonstrate ability to care for self and infant  Patient demonstrates ability to care for self and infant.

## 2017-01-22 NOTE — PROGRESS NOTES
S:  No c/o, decreased vaginal bleeding, no s/s infection  O:  VSS, AF  PE:  Gen:  NAD.  Abd: soft, NT/ND, no peritoneal signs, FF and NT.  Ext=  No s/s DVT  A:1.  S/p  PPD#2  P: D/C with precautions

## 2017-01-22 NOTE — DISCHARGE INSTRUCTIONS
POSTPARTUM DISCHARGE INSTRUCTIONS FOR MOM    YOB: 1985   Age: 32 y.o.               Admit Date: 2017     Discharge Date: 2017  Attending Doctor:  Buck Wright M.D.                  Allergies:  Nkda    Discharged to home by car. Discharged via wheelchair, hospital escort: Yes.  Special equipment needed: Not Applicable  Belongings with: Personal  Be sure to schedule a follow-up appointment with your primary care doctor or any specialists as instructed.     Discharge Plan:   Influenza Vaccine Indication: Not indicated: Previously immunized this influenza season and > 8 years of age    REASONS TO CALL YOUR OBSTETRICIAN:  1.   Persistent fever or shaking chills (Temperature higher than 100.4)  2.   Heavy bleeding (soaking more than 1 pad per hour); Passing clots  3.   Foul odor from vagina  4.   Mastitis (Breast infection; breast pain, chills, fever, redness)  5.   Urinary pain, burning or frequency  6.   Episiotomy infection  7.   Abdominal incision infection  8.   Severe depression longer than 24 hours    HAND WASHING  · Prior to handling the baby.  · Before breastfeeding or bottle feeding baby.  · After using the bathroom or changing the baby's diaper.    WOUND CARE  Ask your physician for additional care instructions.  In general:    ·  Incision:      · Keep clean and dry.    · Do NOT lift anything heavier than your baby for up to 6 weeks.    · There should not be any opening or pus.      VAGINAL CARE  · Nothing inside vagina for 6 weeks: no sexual intercourse, tampons or douching.  · Bleeding may continue for 2-4 weeks.  Amount may vary.    · Call your physician for heavy bleeding which means soaking more than 1 pad per hour    BIRTH CONTROL  · It is possible to become pregnant at any time after delivery and while breastfeeding.  · Plan to discuss a method of birth control with your physician at your follow up visit. visit.    DIET AND ELIMINATION  · Eating more fiber (bran cereal,  "fruits, and vegetables) and drinking plenty of fluids will help to avoid constipation.  · Urinary frequency after childbirth is normal.    POSTPARTUM BLUES  During the first few days after birth, you may experience a sense of the \"blues\" which may include impatience, irritability or even crying.  These feeling come and go quickly.  However, as many as 1 in 10 women experience emotional symptoms known as postpartum depression.    Postpartum depression:  May start as early as the second or third day after delivery or take several weeks or months to develop.  Symptoms of \"blues\" are present, but are more intense:  Crying spells; loss of appetite; feelings of hopelessness or loss of control; fear of touching the baby; over concern or no concern at all about the baby; little or no concern about your own appearance/caring for yourself; and/or inability to sleep or excessive sleeping.  Contact your physician if you are experiencing any of these symptoms.    Crisis Hotline:  · [place] Crisis Hotline:  9-358-YDFFUSR  Or 1-143.852.7176  · [place] Crisis Hotline:  1-895.643.5166  Or 065-180-8024    PREVENTING SHAKEN BABY:  If you are angry or stressed, PUT THE BABY IN THE CRIB, step away, take some deep breaths, and wait until you are calm to care for the baby.  DO NOT SHAKE THE BABY.  You are not alone, call a supporter for help.    · Crisis Call Center 24/7 crisis line 740-692-3315 or 1-851.678.1295  · You can also text them, text \"ANSWER\" to 130851    QUIT SMOKING/TOBACCO USE:  I understand the use of any tobacco products increases my chance of suffering from future heart disease and could cause other illnesses which may shorten my life. Quitting the use of tobacco products is the single most important thing I can do to improve my health. For further information on smoking / tobacco cessation call a Toll Free Quit Line at 1-833.386.7848 (*National [employer] [place]) or 1-453.553.3207 (American Lung Association) or you can " access the web based program at www.lungusa.org.    · [place] Tobacco Users Help Line:  (359) 431-5297       Toll Free: 1-787.736.5596  · Quit Tobacco Program [facility] (114)899-6280    DEPRESSION / SUICIDE RISK:  As you are discharged from this [facility], it is important to learn how to keep safe from harming yourself.    Recognize the warning signs:  · Abrupt changes in personality, positive or negative- including increase in energy   · Giving away possessions  · Change in eating patterns- significant weight changes-  positive or negative  · Change in sleeping patterns- unable to sleep or sleeping all the time   · Unwillingness or inability to communicate  · Depression  · Unusual sadness, discouragement and loneliness  · Talk of wanting to die  · Neglect of personal appearance   · Rebelliousness- reckless behavior  · Withdrawal from people/activities they love  · Confusion- inability to concentrate     If you or a loved one observes any of these behaviors or has concerns about self-harm, here's what you can do:  · Talk about it- your feelings and reasons for harming yourself  · Remove any means that you might use to hurt yourself (examples: pills, rope, extension cords, firearm)  · Get professional help from the community (Mental Health, Substance Abuse, psychological counseling)  · Do not be alone:Call your Safe Contact- someone whom you trust who will be there for you.  · Call your local CRISIS HOTLINE 004-7727 or 597-764-8757  · Call your local Children's Mobile Crisis Response Team [place] (370) 666-3458 or www.[employer]  · Call the toll free National Suicide Prevention Hotlines   · National Suicide Prevention Lifeline 017-556-SSJL (5593)  · National Hope Line Network 800-SUICIDE (919-9884)    DISCHARGE SURVEY:  Thank you for choosing [facility].  We hope we provided you with very good care.  You may be receiving a survey in the mail.  Please fill it out.   Your opinion is valuable to us.    ADDITIONAL EDUCATIONAL MATERIALS GIVEN TO PATIENT:        My signature on this form indicates that:  1.  I have reviewed and understand the above information  2.  My questions regarding this information have been answered to my satisfaction.  3.  I have formulated a plan with my discharge nurse to obtain my prescribed medication for home.

## 2017-01-22 NOTE — CARE PLAN
Problem: Altered physiologic condition related to immediate post-delivery state and potential for bleeding/hemorrhage  Goal: Patient physiologically stable as evidenced by normal lochia, palpable uterine involution and vital signs within normal limits  Outcome: PROGRESSING AS EXPECTED  Assessment WNL. VSS.    Problem: Alteration in comfort related to episiotomy, vaginal repair and/or after birth pains  Goal: Patient is able to ambulate, care for self and infant  Outcome: PROGRESSING AS EXPECTED  Pt cites adequate relief of pain with pain medication provided. Pt will call if she needs pain medication. Pt verbalizes understanding.

## 2017-01-22 NOTE — PROGRESS NOTES
Pt assessed. Pt encouraged to call with needs. Reviewed plan of care. Pt bonding well with infant.

## 2017-01-22 NOTE — PROGRESS NOTES
" note was cancelled as pt states that situation between her and the FOB \"is OK now\". Pt stated that she is  from him and just did not want him at the delivery however, he is OK to visit her now on Post Partum. Password at [facility]'s desk has been discontinued. FOB visited pt and infant son today and per day shift RN, there were no problems.   "

## 2017-01-23 NOTE — DISCHARGE SUMMARY
ADMISSION DIAGNOSES:  1.  Intrauterine pregnancy at 38-2/7 weeks.  2.  Induction for oligohydramnios.  3.  Left renal pelvis dilation of infant.    DISCHARGE DIAGNOSES:  Status post spontaneous vaginal delivery, postpartum day   2.    HOSPITAL COURSE:  The patient was admitted for induction.  She underwent   induction and delivered infant without complications.  Her postpartum course   was uncomplicated on the day of discharge.  She is tolerating a regular diet,   ambulating without difficulty.  Her vital signs are stable within normal   limits.  Her physical exam was within normal limits.  She desired discharge   home.    DISCHARGE MEDICATIONS:  Motrin, Percocet, prenatal vitamin.    DISCHARGE INSTRUCTIONS:  The patient is to follow up if fever greater or equal   to 100 degrees, severe abdominal pain, intractable nausea, vomiting, syncope,   dizziness, vaginal bleeding greater than a period, or any other concerns.    She will have pelvic rest for 6 weeks, no driving for 3 days or while on   narcotic medication.  Followup appointment in 6 weeks.       ____________________________________     MD DIGNA PERKINS / FRANCISCO    DD:  01/22/2017 12:22:47  DT:  01/22/2017 17:24:54    D#:  [id number]  Job#:  [id number]    cc: _____ _____

## 2017-07-12 ENCOUNTER — HOSPITAL ENCOUNTER (EMERGENCY)
Facility: MEDICAL CENTER | Age: 32
End: 2017-07-12
Attending: EMERGENCY MEDICINE
Payer: MEDICAID

## 2017-07-12 VITALS
OXYGEN SATURATION: 100 % | WEIGHT: 169.97 LBS | BODY MASS INDEX: 26.68 KG/M2 | DIASTOLIC BLOOD PRESSURE: 47 MMHG | TEMPERATURE: 98.7 F | HEART RATE: 78 BPM | HEIGHT: 67 IN | RESPIRATION RATE: 20 BRPM | SYSTOLIC BLOOD PRESSURE: 94 MMHG

## 2017-07-12 DIAGNOSIS — L02.214 ABSCESS OF GROIN, LEFT: ICD-10-CM

## 2017-07-12 PROCEDURE — 99283 EMERGENCY DEPT VISIT LOW MDM: CPT

## 2017-07-12 PROCEDURE — 303977 HCHG I & D

## 2017-07-12 PROCEDURE — 700101 HCHG RX REV CODE 250

## 2017-07-12 RX ORDER — AMOXICILLIN 500 MG/1
500 CAPSULE ORAL 3 TIMES DAILY
Qty: 21 CAP | Refills: 0 | Status: SHIPPED | OUTPATIENT
Start: 2017-07-12 | End: 2017-07-19

## 2017-07-12 RX ORDER — SULFAMETHOXAZOLE AND TRIMETHOPRIM 800; 160 MG/1; MG/1
1 TABLET ORAL 2 TIMES DAILY
Qty: 14 TAB | Refills: 0 | Status: SHIPPED | OUTPATIENT
Start: 2017-07-12 | End: 2017-07-19

## 2017-07-12 RX ORDER — LIDOCAINE HYDROCHLORIDE 10 MG/ML
20 INJECTION, SOLUTION INFILTRATION; PERINEURAL ONCE
Status: DISCONTINUED | OUTPATIENT
Start: 2017-07-12 | End: 2017-07-12 | Stop reason: HOSPADM

## 2017-07-12 RX ORDER — LIDOCAINE HYDROCHLORIDE 20 MG/ML
20 INJECTION, SOLUTION INFILTRATION; PERINEURAL ONCE
Status: COMPLETED | OUTPATIENT
Start: 2017-07-12 | End: 2017-07-12

## 2017-07-12 RX ORDER — LIDOCAINE HYDROCHLORIDE 20 MG/ML
INJECTION, SOLUTION INFILTRATION; PERINEURAL
Status: COMPLETED
Start: 2017-07-12 | End: 2017-07-12

## 2017-07-12 RX ADMIN — LIDOCAINE HYDROCHLORIDE: 20 INJECTION, SOLUTION INFILTRATION; PERINEURAL at 19:30

## 2017-07-12 ASSESSMENT — LIFESTYLE VARIABLES: DO YOU DRINK ALCOHOL: NO

## 2017-07-12 ASSESSMENT — PAIN SCALES - GENERAL: PAINLEVEL_OUTOF10: 0

## 2017-07-12 NOTE — ED AVS SNAPSHOT
IGIGI Access Code: [email or web address]  Expires: 7/30/2017  4:09 AM    IGIGI  A secure, online tool to manage your health information     [employer] IGIGI® is a secure, online tool that connects you to your personalized health information from the privacy of your home -- day or night - making it very easy for you to manage your healthcare. Once the activation process is completed, you can even access your medical information using the IGIGI demar, which is available for free in the Apple Demar store or Google Play store.     IGIGI provides the following levels of access (as shown below):   My Chart Features   [facility] Primary Care Doctor [facility]  Specialists [facility]  Urgent  Care Non-[facility]  Primary Care  Doctor   Email your healthcare team securely and privately 24/7 X X X X   Manage appointments: schedule your next appointment; view details of past/upcoming appointments X      Request prescription refills. X      View recent personal medical records, including lab and immunizations X X X X   View health record, including health history, allergies, medications X X X X   Read reports about your outpatient visits, procedures, consult and ER notes X X X X   See your discharge summary, which is a recap of your hospital and/or ER visit that includes your diagnosis, lab results, and care plan. X X       How to register for IGIGI:  1. Go to  [email or web address].  2. Click on the Sign Up Now box, which takes you to the New Member Sign Up page. You will need to provide the following information:  a. Enter your IGIGI Access Code exactly as it appears at the top of this page. (You will not need to use this code after you’ve completed the sign-up process. If you do not sign up before the expiration date, you must request a new code.)   b. Enter your date of birth.   c. Enter your home email address.   d. Click Submit, and follow the next screen’s instructions.  3. Create a IGIGI ID. This will be your IGIGI  login ID and cannot be changed, so think of one that is secure and easy to remember.  4. Create a [employer] password. You can change your password at any time.  5. Enter your Password Reset Question and Answer. This can be used at a later time if you forget your password.   6. Enter your e-mail address. This allows you to receive e-mail notifications when new information is available in [employer].  7. Click Sign Up. You can now view your health information.    For assistance activating your [employer] account, call [phone]

## 2017-07-12 NOTE — ED NOTES
Chief Complaint   Patient presents with   • Cyst     pt states fluid filled cyst on right side of mike. pt states no pain unless she crosses her legs. no bleeding or weeping.

## 2017-07-12 NOTE — ED AVS SNAPSHOT
7/12/2017    Karin Martin Manda Richard  [address]  [place] [address]    Dear Karin:    [facility] wants to ensure your discharge home is safe and you or your loved ones have had all of your questions answered regarding your care after you leave the hospital.    Below is a list of resources and contact information should you have any questions regarding your hospital stay, follow-up instructions, or active medical symptoms.    Questions or Concerns Regarding… Contact   Medical Questions Related to Your Discharge  (7 days a week, 8am-5pm) Contact a Nurse Care Coordinator   [phone]   Medical Questions Not Related to Your Discharge  (24 hours a day / 7 days a week)  Contact the Nurse Health Line   [phone]    Medications or Discharge Instructions Refer to your discharge packet   or contact your [facility] Primary Care Provider   [phone]   Follow-up Appointment(s) Schedule your appointment via [employer]   or contact Scheduling [phone]   Billing Review your statement via [employer]  or contact Billing [phone]   Medical Records Review your records via [employer]   or contact Medical Records [phone]     You may receive a telephone call within two days of discharge. This call is to make certain you understand your discharge instructions and have the opportunity to have any questions answered. You can also easily access your medical information, test results and upcoming appointments via the [employer] free online health management tool. You can learn more and sign up at [employer]. For assistance setting up your [employer] account, please call [phone].    Once again, we want to ensure your discharge home is safe and that you have a clear understanding of any next steps in your care. If you have any questions or concerns, please do not hesitate to contact us, we are here for you. Thank you for choosing [facility] for your healthcare needs.    Sincerely,    Your [facility] Healthcare Team

## 2017-07-12 NOTE — ED AVS SNAPSHOT
Home Care Instructions                                                                                                                Karin Richard   MRN: [id number]    Department:  [facility], Emergency Dept   Date of Visit:  7/12/2017            [facility], Emergency Dept    [address] [facility] [address] [address]    Phone:  [phone]      You were seen by     Je Michael M.D.      Your Diagnosis Was     Abscess of groin, left     L02.214       These are the medications you received during your hospitalization from 07/12/2017 1510 to 07/12/2017 1939     Date/Time Order Dose Route Action    07/12/2017 1930 lidocaine (XYLOCAINE) 2 % injection 20 mL   Other Given      Follow-up Information     1. Follow up with Farshad Hatch M.D. In 2 days.    Specialty:  Family Medicine    Why:  For wound re-check    Contact information    [address] [place] [id number] [phone]        Medication Information     Review all of your home medications and newly ordered medications with your primary doctor and/or pharmacist as soon as possible. Follow medication instructions as directed by your doctor and/or pharmacist.     Please keep your complete medication list with you and share with your physician. Update the information when medications are discontinued, doses are changed, or new medications (including over-the-counter products) are added; and carry medication information at all times in the event of emergency situations.               Medication List      START taking these medications        Instructions    Morning Afternoon Evening Bedtime    amoxicillin 500 MG Caps   Commonly known as:  AMOXIL        Take 1 Cap by mouth 3 times a day for 7 days.   Dose:  500 mg                        sulfamethoxazole-trimethoprim 800-160 MG tablet   Commonly known as:  BACTRIM DS        Take 1 Tab by mouth 2 times a day for 7 days.   Dose:  1 Tab                                Where to Get Your Medications      You can get these medications from any pharmacy     Bring a paper prescription for each of these medications    - amoxicillin 500 MG Caps  - sulfamethoxazole-trimethoprim 800-160 MG tablet              Discharge Instructions       Abscess  An abscess (boil or furuncle) is an infected area on or under the skin. This area is filled with yellowish-white fluid (pus) and other material (debris).  HOME CARE   · Only take medicines as told by your doctor.  · If you were given antibiotic medicine, take it as directed. Finish the medicine even if you start to feel better.  · If gauze is used, follow your doctor's directions for changing the gauze.  · To avoid spreading the infection:  ¨ Keep your abscess covered with a bandage.  ¨ Wash your hands well.  ¨ Do not share personal care items, towels, or whirlpools with others.  ¨ Avoid skin contact with others.  · Keep your skin and clothes clean around the abscess.  · Keep all doctor visits as told.  GET HELP RIGHT AWAY IF:   · You have more pain, puffiness (swelling), or redness in the wound site.  · You have more fluid or blood coming from the wound site.  · You have muscle aches, chills, or you feel sick.  · You have a fever.  MAKE SURE YOU:   · Understand these instructions.  · Will watch your condition.  · Will get help right away if you are not doing well or get worse.     This information is not intended to replace advice given to you by your health care provider. Make sure you discuss any questions you have with your health care provider.     Document Released: 06/05/2009 Document Revised: 06/18/2013 Document Reviewed: 03/02/2013  Elsevier Interactive Patient Education ©2016 [employer] Inc.            Patient Information     Patient Information    Following emergency treatment: all patient requiring follow-up care must return either to a private physician or a clinic if your condition worsens before you are able to obtain further  medical attention, please return to the emergency room.     Billing Information    At [facility], we work to make the billing process streamlined for our patients.  Our Representatives are here to answer any questions you may have regarding your hospital bill.  If you have insurance coverage and have supplied your insurance information to us, we will submit a claim to your insurer on your behalf.  Should you have any questions regarding your bill, we can be reached online or by phone as follows:  Online: You are able pay your bills online or live chat with our representatives about any billing questions you may have. We are here to help Monday - Friday from 8:00am to 7:30pm and 9:00am - 12:00pm on Saturdays.  Please visit [email or web address]  for more information.   Phone:  [phone] or [phone]    Please note that your emergency physician, surgeon, pathologist, radiologist, anesthesiologist, and other specialists are not employed by [facility] and will therefore bill separately for their services.  Please contact them directly for any questions concerning their bills at the numbers below:     Emergency Physician Services:  [phone]  [facility]:  [phone]  [facility]:  [phone]  [facility]:  [phone]    1. Your final bill may vary from the amount quoted upon discharge if all procedures are not complete at that time, or if your doctor has additional procedures of which we are not aware. You will receive an additional bill if you return to the Emergency Department at [facility] for suture removal regardless of the facility of which the sutures were placed.     2. Please arrange for settlement of this account at the emergency registration.    3. All self-pay accounts are due in full at the time of treatment.  If you are unable to meet this obligation then payment is expected within 4-5 days.     4. If you  have had radiology studies (CT, X-ray, Ultrasound, MRI), you have received a preliminary result during your emergency department visit. Please contact the radiology department [phone] to receive a copy of your final result. Please discuss the Final result with your primary physician or with the follow up physician provided.     Crisis Hotline:  [place] Crisis Hotline:  2-371-KDMCICK or 1-565.654.2869  [place] Crisis Hotline:    1-556.243.1992 or 729-777-1088         ED Discharge Follow Up Questions    1. In order to provide you with very good care, we would like to follow up with a phone call in the next few days.  May we have your permission to contact you?     YES /  NO    2. What is the best phone number to call you? (       )_____-__________    3. What is the best time to call you?      Morning  /  Afternoon  /  Evening                   Patient Signature:  ____________________________________________________________    Date:  ____________________________________________________________

## 2017-07-13 NOTE — ED NOTES
All DC discussed. Pt verbalized understanding of all DC instructions, prescriptions and follow up recommendations. Pt ambulated to lobby with upright and steady gait.

## 2017-07-13 NOTE — DISCHARGE INSTRUCTIONS
Abscess  An abscess (boil or furuncle) is an infected area on or under the skin. This area is filled with yellowish-white fluid (pus) and other material (debris).  HOME CARE   · Only take medicines as told by your doctor.  · If you were given antibiotic medicine, take it as directed. Finish the medicine even if you start to feel better.  · If gauze is used, follow your doctor's directions for changing the gauze.  · To avoid spreading the infection:  ¨ Keep your abscess covered with a bandage.  ¨ Wash your hands well.  ¨ Do not share personal care items, towels, or whirlpools with others.  ¨ Avoid skin contact with others.  · Keep your skin and clothes clean around the abscess.  · Keep all doctor visits as told.  GET HELP RIGHT AWAY IF:   · You have more pain, puffiness (swelling), or redness in the wound site.  · You have more fluid or blood coming from the wound site.  · You have muscle aches, chills, or you feel sick.  · You have a fever.  MAKE SURE YOU:   · Understand these instructions.  · Will watch your condition.  · Will get help right away if you are not doing well or get worse.     This information is not intended to replace advice given to you by your health care provider. Make sure you discuss any questions you have with your health care provider.     Document Released: 06/05/2009 Document Revised: 06/18/2013 Document Reviewed: 03/02/2013  [employer] Interactive Patient Education ©2016 [employer]

## 2017-07-13 NOTE — ED PROVIDER NOTES
ED Provider Note    CHIEF COMPLAINT  Chief Complaint   Patient presents with   • Cyst     pt states fluid filled cyst on right side of mike. pt states no pain unless she crosses her legs. no bleeding or weeping.        HPI  Karin Richard is a 32 y.o. female who presents for evaluation of pain and swelling on the right lower groin. The patient reports 2-3 days of symptoms. She is a razor to shave her care. She denies pregnancy no vaginal bleeding or discharge specifically no pain or swelling in the labia or in the introitus. She has never had this before. No significant medical or surgical history    REVIEW OF SYSTEMS  See HPI for further details. No high fevers chills nausea as well as numbness tingling or weakness All other systems are negative.     PAST MEDICAL HISTORY  Past Medical History   Diagnosis Date   • Alcohol abuse 3/19/2013   • Unspecified urinary incontinence    • Other specified symptom associated with female genital organs    • H/O hypotension 2008   • Cold      nasal congestion, green sputum   • HPV in female    • Abnormal Pap smear of vagina and vaginal HPV      2008, HAD LEAP PROCEDURE DONE    • Headache(784.0)      MIGRAINES 2014, STARTED AFTER SEEING CHIROPRACTOR   • Heart murmur 2002     Pt was told had heart murmur with pregnancy 2008   • Kidney disease 2208     was told kidneys shutting down when pregnant,  improved after  pregnancy 2008   • Arthritis 8/2014   • Urinary, incontinence, stress female previously scheduled for surgery with Dr Singh but canceled on the day of 3/20/2015       FAMILY HISTORY  No history of bleeding disorder    SOCIAL HISTORY  Social History     Social History   • Marital Status:      Spouse Name: N/A   • Number of Children: N/A   • Years of Education: N/A     Social History Main Topics   • Smoking status: Former Smoker -- 0.25 packs/day for 4 years     Types: Cigarettes     Quit date: 05/14/2016   • Smokeless tobacco: Not on file      Comment:  "Pt. states smoked for 4yrs off and on would stop during pregnancy   • Alcohol Use: No      Comment: quit 1 yr ago   • Drug Use: No   • Sexual Activity:     Partners: Male      Comment: NUVA RING      Other Topics Concern   • Not on file     Social History Narrative    former smoker denies IV drugs    SURGICAL HISTORY  No past surgical history on file.  No major surgeries reported  CURRENT MEDICATIONS    No regular medications  ALLERGIES  Allergies   Allergen Reactions   • Nkda [No Known Drug Allergy]        PHYSICAL EXAM  VITAL SIGNS: /49 mmHg  Pulse 65  Temp(Src) 36.8 °C (98.3 °F)  Resp 18  Ht 1.702 m (5' 7\")  Wt 77.1 kg (169 lb 15.6 oz)  BMI 26.62 kg/m2  SpO2 100%  LMP 11/17/2014 Room air O2: 100    Constitutional: Well developed, Well nourished, No acute distress, Non-toxic appearance.   HENT: Normocephalic, Atraumatic, Bilateral external ears normal, Oropharynx moist, No oral exudates, Nose normal.   Eyes: PERRLA, EOMI, Conjunctiva normal, No discharge.   Neck: Normal range of motion, No tenderness, Supple, No stridor.   Cardiovascular: Normal heart rate, Normal rhythm, No murmurs, No rubs, No gallops.   Thorax & Lungs: Normal breath sounds, No respiratory distress, No wheezing, No chest tenderness.   Abdomen: Bowel sounds normal, Soft, No tenderness, No masses, No pulsatile masses.   Skin: Warm, Dry, No erythema, No rash.   Back: No tenderness, No CVA tenderness.   Genitalia: Female nurse was in the room External genitalia appear normal, at the inguinal crease not involving the labia but adjacent to it there is a 1 x 3 cm area of erythema tenderness and minimal fluctuance surrounding cellulitis.   Extremities: Intact distal pulses, No edema, No tenderness, No cyanosis, No clubbing.   Neurologic: Alert & oriented x 3, Normal motor function, Normal sensory function, No focal deficits noted.   Psychiatric: Anxious      RADIOLOGY/PROCEDURES  Incision and drainage of small cutaneous abscess. The wound " was prepped with chlorhexidine. 3 mL of 2% buffered lidocaine was infiltrated into the lesion and deeper tissues for anesthesia.  Blade scalpel was used to make a 7 mm longitudinal incision. There is a small amount of blood and pus evacuated. Loculations were broken up with a blunt instrument. The tract was not deep enough to place packing. Sterile dressing applied no complications    COURSE & MEDICAL DECISION MAKING  Pertinent Labs & Imaging studies reviewed. (See chart for details)  The patient came in before this infection became rather large. There is a small abscess that was drained. I'll continue her on amoxicillin and Bactrim. There did not appear to be any extension into the labia or genitalia area and this is likely related to her shaving razor. I advised her to be sure to change her razors regularly    FINAL IMPRESSION  1.   1. Abscess of groin, left               Electronically signed by: Je Michael, 7/12/2017 7:04 PM

## 2017-07-17 ENCOUNTER — APPOINTMENT (OUTPATIENT)
Dept: RADIOLOGY | Facility: MEDICAL CENTER | Age: 32
End: 2017-07-17
Attending: EMERGENCY MEDICINE
Payer: MEDICAID

## 2017-07-17 ENCOUNTER — HOSPITAL ENCOUNTER (OUTPATIENT)
Facility: MEDICAL CENTER | Age: 32
End: 2017-07-18
Attending: EMERGENCY MEDICINE | Admitting: OBSTETRICS & GYNECOLOGY
Payer: MEDICAID

## 2017-07-17 DIAGNOSIS — O00.90 ECTOPIC PREGNANCY, UNSPECIFIED LOCATION, UNSPECIFIED WHETHER INTRAUTERINE PREGNANCY PRESENT: ICD-10-CM

## 2017-07-17 DIAGNOSIS — I95.9 HYPOTENSION, UNSPECIFIED HYPOTENSION TYPE: ICD-10-CM

## 2017-07-17 PROBLEM — O08.0: Status: ACTIVE | Noted: 2017-07-17

## 2017-07-17 LAB
ABO GROUP BLD: NORMAL
ALBUMIN SERPL BCP-MCNC: 4 G/DL (ref 3.2–4.9)
ALBUMIN/GLOB SERPL: 1.6 G/DL
ALP SERPL-CCNC: 64 U/L (ref 30–99)
ALT SERPL-CCNC: 19 U/L (ref 2–50)
ANION GAP SERPL CALC-SCNC: 11 MMOL/L (ref 0–11.9)
APTT PPP: 22.7 SEC (ref 24.7–36)
AST SERPL-CCNC: 19 U/L (ref 12–45)
B-HCG SERPL-ACNC: ABNORMAL MIU/ML (ref 0–5)
BARCODED ABORH UBTYP: 9500
BARCODED PRD CODE UBPRD: NORMAL
BARCODED UNIT NUM UBUNT: NORMAL
BASE EXCESS BLDA CALC-SCNC: -11 MMOL/L (ref -4–3)
BASE EXCESS BLDA CALC-SCNC: -9 MMOL/L (ref -4–3)
BASOPHILS # BLD AUTO: 0.1 % (ref 0–1.8)
BASOPHILS # BLD AUTO: 0.4 % (ref 0–1.8)
BASOPHILS # BLD: 0.02 K/UL (ref 0–0.12)
BASOPHILS # BLD: 0.08 K/UL (ref 0–0.12)
BILIRUB SERPL-MCNC: 0.5 MG/DL (ref 0.1–1.5)
BLD GP AB SCN SERPL QL: NORMAL
BODY TEMPERATURE: 37 DEGREES
BODY TEMPERATURE: 37 DEGREES
BUN SERPL-MCNC: 8 MG/DL (ref 8–22)
CA-I BLD ISE-SCNC: 0.83 MMOL/L (ref 1.1–1.3)
CA-I BLD ISE-SCNC: 0.91 MMOL/L (ref 1.1–1.3)
CALCIUM SERPL-MCNC: 8.8 MG/DL (ref 8.5–10.5)
CFT BLD TEG: 3.8 MIN (ref 5–10)
CHLORIDE SERPL-SCNC: 105 MMOL/L (ref 96–112)
CLOT ANGLE BLD TEG: 61.5 DEGREES (ref 53–72)
CO2 BLDA-SCNC: 17 MMOL/L (ref 20–33)
CO2 BLDA-SCNC: 19 MMOL/L (ref 20–33)
CO2 SERPL-SCNC: 20 MMOL/L (ref 20–33)
COMPONENT FT 8504FT: NORMAL
COMPONENT P 8504P: NORMAL
COMPONENT R 8504R: NORMAL
CREAT SERPL-MCNC: 0.81 MG/DL (ref 0.5–1.4)
CT.EXTRINSIC BLD ROTEM: 2.2 MIN (ref 1–3)
EOSINOPHIL # BLD AUTO: 0.01 K/UL (ref 0–0.51)
EOSINOPHIL # BLD AUTO: 0.14 K/UL (ref 0–0.51)
EOSINOPHIL NFR BLD: 0.1 % (ref 0–6.9)
EOSINOPHIL NFR BLD: 0.6 % (ref 0–6.9)
ERYTHROCYTE [DISTWIDTH] IN BLOOD BY AUTOMATED COUNT: 46.6 FL (ref 35.9–50)
ERYTHROCYTE [DISTWIDTH] IN BLOOD BY AUTOMATED COUNT: 46.7 FL (ref 35.9–50)
ERYTHROCYTE [DISTWIDTH] IN BLOOD BY AUTOMATED COUNT: 47.3 FL (ref 35.9–50)
ERYTHROCYTE [DISTWIDTH] IN BLOOD BY AUTOMATED COUNT: 47.8 FL (ref 35.9–50)
GFR SERPL CREATININE-BSD FRML MDRD: >60 ML/MIN/1.73 M 2
GLOBULIN SER CALC-MCNC: 2.5 G/DL (ref 1.9–3.5)
GLUCOSE BLD-MCNC: 114 MG/DL (ref 65–99)
GLUCOSE BLD-MCNC: 120 MG/DL (ref 65–99)
GLUCOSE BLD-MCNC: 122 MG/DL (ref 65–99)
GLUCOSE SERPL-MCNC: 230 MG/DL (ref 65–99)
HCG SERPL QL: POSITIVE
HCO3 BLDA-SCNC: 16.1 MMOL/L (ref 17–25)
HCO3 BLDA-SCNC: 17.9 MMOL/L (ref 17–25)
HCT VFR BLD AUTO: 25.7 % (ref 37–47)
HCT VFR BLD AUTO: 32.1 % (ref 37–47)
HCT VFR BLD AUTO: 36.1 % (ref 37–47)
HCT VFR BLD AUTO: 38.7 % (ref 37–47)
HCT VFR BLD CALC: 16 % (ref 37–47)
HCT VFR BLD CALC: <15 % (ref 37–47)
HGB BLD-MCNC: 10.9 G/DL (ref 12–16)
HGB BLD-MCNC: 12.2 G/DL (ref 12–16)
HGB BLD-MCNC: 12.5 G/DL (ref 12–16)
HGB BLD-MCNC: 5.4 G/DL (ref 12–16)
HGB BLD-MCNC: 8.9 G/DL (ref 12–16)
IMM GRANULOCYTES # BLD AUTO: 0.12 K/UL (ref 0–0.11)
IMM GRANULOCYTES # BLD AUTO: 0.25 K/UL (ref 0–0.11)
IMM GRANULOCYTES NFR BLD AUTO: 0.7 % (ref 0–0.9)
IMM GRANULOCYTES NFR BLD AUTO: 1.1 % (ref 0–0.9)
INR PPP: 1.09 (ref 0.87–1.13)
INR PPP: 1.09 (ref 0.87–1.13)
LIPASE SERPL-CCNC: 25 U/L (ref 11–82)
LYMPHOCYTES # BLD AUTO: 2.26 K/UL (ref 1–4.8)
LYMPHOCYTES # BLD AUTO: 4.22 K/UL (ref 1–4.8)
LYMPHOCYTES NFR BLD: 13.8 % (ref 22–41)
LYMPHOCYTES NFR BLD: 18.7 % (ref 22–41)
MCF BLD TEG: 56.1 MM (ref 50–70)
MCH RBC QN AUTO: 29.3 PG (ref 27–33)
MCH RBC QN AUTO: 29.9 PG (ref 27–33)
MCH RBC QN AUTO: 30.1 PG (ref 27–33)
MCH RBC QN AUTO: 30.4 PG (ref 27–33)
MCHC RBC AUTO-ENTMCNC: 32.3 G/DL (ref 33.6–35)
MCHC RBC AUTO-ENTMCNC: 33.8 G/DL (ref 33.6–35)
MCHC RBC AUTO-ENTMCNC: 34 G/DL (ref 33.6–35)
MCHC RBC AUTO-ENTMCNC: 34.6 G/DL (ref 33.6–35)
MCV RBC AUTO: 87.7 FL (ref 81.4–97.8)
MCV RBC AUTO: 88.2 FL (ref 81.4–97.8)
MCV RBC AUTO: 89.1 FL (ref 81.4–97.8)
MCV RBC AUTO: 90.8 FL (ref 81.4–97.8)
MONOCYTES # BLD AUTO: 1.03 K/UL (ref 0–0.85)
MONOCYTES # BLD AUTO: 1.46 K/UL (ref 0–0.85)
MONOCYTES NFR BLD AUTO: 4.6 % (ref 0–13.4)
MONOCYTES NFR BLD AUTO: 8.9 % (ref 0–13.4)
NEUTROPHILS # BLD AUTO: 12.48 K/UL (ref 2–7.15)
NEUTROPHILS # BLD AUTO: 16.87 K/UL (ref 2–7.15)
NEUTROPHILS NFR BLD: 74.6 % (ref 44–72)
NEUTROPHILS NFR BLD: 76.4 % (ref 44–72)
NRBC # BLD AUTO: 0 K/UL
NRBC # BLD AUTO: 0 K/UL
NRBC BLD AUTO-RTO: 0 /100 WBC
NRBC BLD AUTO-RTO: 0 /100 WBC
O2/TOTAL GAS SETTING VFR VENT: 100 %
O2/TOTAL GAS SETTING VFR VENT: 100 %
PCO2 BLDA: 42.6 MMHG (ref 26–37)
PCO2 BLDA: 46 MMHG (ref 26–37)
PCO2 TEMP ADJ BLDA: 42.6 MMHG (ref 26–37)
PCO2 TEMP ADJ BLDA: 46 MMHG (ref 26–37)
PH BLDA: 7.19 [PH] (ref 7.4–7.5)
PH BLDA: 7.2 [PH] (ref 7.4–7.5)
PH TEMP ADJ BLDA: 7.19 [PH] (ref 7.4–7.5)
PH TEMP ADJ BLDA: 7.2 [PH] (ref 7.4–7.5)
PLATELET # BLD AUTO: 136 K/UL (ref 164–446)
PLATELET # BLD AUTO: 155 K/UL (ref 164–446)
PLATELET # BLD AUTO: 161 K/UL (ref 164–446)
PLATELET # BLD AUTO: 311 K/UL (ref 164–446)
PMV BLD AUTO: 9.7 FL (ref 9–12.9)
PMV BLD AUTO: 9.8 FL (ref 9–12.9)
PMV BLD AUTO: 9.9 FL (ref 9–12.9)
PMV BLD AUTO: 9.9 FL (ref 9–12.9)
PO2 BLDA: 45 MMHG (ref 64–87)
PO2 BLDA: 81 MMHG (ref 64–87)
PO2 TEMP ADJ BLDA: 45 MMHG (ref 64–87)
PO2 TEMP ADJ BLDA: 81 MMHG (ref 64–87)
POTASSIUM BLD-SCNC: 4.6 MMOL/L (ref 3.6–5.5)
POTASSIUM BLD-SCNC: 5.1 MMOL/L (ref 3.6–5.5)
POTASSIUM SERPL-SCNC: 3.8 MMOL/L (ref 3.6–5.5)
PRODUCT TYPE UPROD: NORMAL
PROT SERPL-MCNC: 6.5 G/DL (ref 6–8.2)
PROTHROMBIN TIME: 14.4 SEC (ref 12–14.6)
PROTHROMBIN TIME: 14.5 SEC (ref 12–14.6)
RBC # BLD AUTO: 2.93 M/UL (ref 4.2–5.4)
RBC # BLD AUTO: 3.64 M/UL (ref 4.2–5.4)
RBC # BLD AUTO: 4.05 M/UL (ref 4.2–5.4)
RBC # BLD AUTO: 4.26 M/UL (ref 4.2–5.4)
RH BLD: NORMAL
SAO2 % BLDA: 71 % (ref 93–99)
SAO2 % BLDA: 93 % (ref 93–99)
SODIUM BLD-SCNC: 141 MMOL/L (ref 135–145)
SODIUM BLD-SCNC: 144 MMOL/L (ref 135–145)
SODIUM SERPL-SCNC: 136 MMOL/L (ref 135–145)
SPECIMEN DRAWN FROM PATIENT: ABNORMAL
SPECIMEN DRAWN FROM PATIENT: ABNORMAL
TEG ALGORITHM TGALG: ABNORMAL
UNIT STATUS USTAT: NORMAL
WBC # BLD AUTO: 16.4 K/UL (ref 4.8–10.8)
WBC # BLD AUTO: 20 K/UL (ref 4.8–10.8)
WBC # BLD AUTO: 22.6 K/UL (ref 4.8–10.8)
WBC # BLD AUTO: 24 K/UL (ref 4.8–10.8)

## 2017-07-17 PROCEDURE — 84132 ASSAY OF SERUM POTASSIUM: CPT

## 2017-07-17 PROCEDURE — 85730 THROMBOPLASTIN TIME PARTIAL: CPT

## 2017-07-17 PROCEDURE — 160029 HCHG SURGERY MINUTES - 1ST 30 MINS LEVEL 4: Performed by: OBSTETRICS & GYNECOLOGY

## 2017-07-17 PROCEDURE — 80053 COMPREHEN METABOLIC PANEL: CPT

## 2017-07-17 PROCEDURE — 94760 N-INVAS EAR/PLS OXIMETRY 1: CPT

## 2017-07-17 PROCEDURE — 85025 COMPLETE CBC W/AUTO DIFF WBC: CPT

## 2017-07-17 PROCEDURE — 99291 CRITICAL CARE FIRST HOUR: CPT

## 2017-07-17 PROCEDURE — 84702 CHORIONIC GONADOTROPIN TEST: CPT

## 2017-07-17 PROCEDURE — 700111 HCHG RX REV CODE 636 W/ 250 OVERRIDE (IP): Performed by: OBSTETRICS & GYNECOLOGY

## 2017-07-17 PROCEDURE — 501838 HCHG SUTURE GENERAL: Performed by: OBSTETRICS & GYNECOLOGY

## 2017-07-17 PROCEDURE — 83690 ASSAY OF LIPASE: CPT

## 2017-07-17 PROCEDURE — 700105 HCHG RX REV CODE 258

## 2017-07-17 PROCEDURE — 160035 HCHG PACU - 1ST 60 MINS PHASE I: Performed by: OBSTETRICS & GYNECOLOGY

## 2017-07-17 PROCEDURE — 84703 CHORIONIC GONADOTROPIN ASSAY: CPT

## 2017-07-17 PROCEDURE — 96375 TX/PRO/DX INJ NEW DRUG ADDON: CPT

## 2017-07-17 PROCEDURE — 84295 ASSAY OF SERUM SODIUM: CPT

## 2017-07-17 PROCEDURE — 36415 COLL VENOUS BLD VENIPUNCTURE: CPT

## 2017-07-17 PROCEDURE — P9016 RBC LEUKOCYTES REDUCED: HCPCS

## 2017-07-17 PROCEDURE — 82330 ASSAY OF CALCIUM: CPT | Mod: 91

## 2017-07-17 PROCEDURE — 700111 HCHG RX REV CODE 636 W/ 250 OVERRIDE (IP)

## 2017-07-17 PROCEDURE — 82803 BLOOD GASES ANY COMBINATION: CPT

## 2017-07-17 PROCEDURE — 160036 HCHG PACU - EA ADDL 30 MINS PHASE I: Performed by: OBSTETRICS & GYNECOLOGY

## 2017-07-17 PROCEDURE — 86901 BLOOD TYPING SEROLOGIC RH(D): CPT

## 2017-07-17 PROCEDURE — A9270 NON-COVERED ITEM OR SERVICE: HCPCS | Performed by: OBSTETRICS & GYNECOLOGY

## 2017-07-17 PROCEDURE — 96367 TX/PROPH/DG ADDL SEQ IV INF: CPT

## 2017-07-17 PROCEDURE — 303747 HCHG EXTRA SUTURE

## 2017-07-17 PROCEDURE — 502240 HCHG MISC OR SUPPLY RC 0272: Performed by: OBSTETRICS & GYNECOLOGY

## 2017-07-17 PROCEDURE — 96361 HYDRATE IV INFUSION ADD-ON: CPT

## 2017-07-17 PROCEDURE — 85347 COAGULATION TIME ACTIVATED: CPT

## 2017-07-17 PROCEDURE — 160002 HCHG RECOVERY MINUTES (STAT): Performed by: OBSTETRICS & GYNECOLOGY

## 2017-07-17 PROCEDURE — 160048 HCHG OR STATISTICAL LEVEL 1-5: Performed by: OBSTETRICS & GYNECOLOGY

## 2017-07-17 PROCEDURE — A6402 STERILE GAUZE <= 16 SQ IN: HCPCS | Performed by: OBSTETRICS & GYNECOLOGY

## 2017-07-17 PROCEDURE — 85014 HEMATOCRIT: CPT | Mod: 91

## 2017-07-17 PROCEDURE — 86923 COMPATIBILITY TEST ELECTRIC: CPT

## 2017-07-17 PROCEDURE — 85384 FIBRINOGEN ACTIVITY: CPT

## 2017-07-17 PROCEDURE — 82962 GLUCOSE BLOOD TEST: CPT

## 2017-07-17 PROCEDURE — 85027 COMPLETE CBC AUTOMATED: CPT

## 2017-07-17 PROCEDURE — 700101 HCHG RX REV CODE 250

## 2017-07-17 PROCEDURE — 36556 INSERT NON-TUNNEL CV CATH: CPT

## 2017-07-17 PROCEDURE — C1751 CATH, INF, PER/CENT/MIDLINE: HCPCS

## 2017-07-17 PROCEDURE — 36430 TRANSFUSION BLD/BLD COMPNT: CPT

## 2017-07-17 PROCEDURE — 160041 HCHG SURGERY MINUTES - EA ADDL 1 MIN LEVEL 4: Performed by: OBSTETRICS & GYNECOLOGY

## 2017-07-17 PROCEDURE — 85610 PROTHROMBIN TIME: CPT

## 2017-07-17 PROCEDURE — A6404 STERILE GAUZE > 48 SQ IN: HCPCS | Performed by: OBSTETRICS & GYNECOLOGY

## 2017-07-17 PROCEDURE — 76705 ECHO EXAM OF ABDOMEN: CPT

## 2017-07-17 PROCEDURE — G0378 HOSPITAL OBSERVATION PER HR: HCPCS

## 2017-07-17 PROCEDURE — 700111 HCHG RX REV CODE 636 W/ 250 OVERRIDE (IP): Performed by: EMERGENCY MEDICINE

## 2017-07-17 PROCEDURE — 96365 THER/PROPH/DIAG IV INF INIT: CPT

## 2017-07-17 PROCEDURE — 700105 HCHG RX REV CODE 258: Performed by: EMERGENCY MEDICINE

## 2017-07-17 PROCEDURE — 160009 HCHG ANES TIME/MIN: Performed by: OBSTETRICS & GYNECOLOGY

## 2017-07-17 PROCEDURE — 86900 BLOOD TYPING SEROLOGIC ABO: CPT

## 2017-07-17 PROCEDURE — 88305 TISSUE EXAM BY PATHOLOGIST: CPT

## 2017-07-17 PROCEDURE — 82947 ASSAY GLUCOSE BLOOD QUANT: CPT

## 2017-07-17 PROCEDURE — 86850 RBC ANTIBODY SCREEN: CPT

## 2017-07-17 PROCEDURE — 71010 DX-CHEST-PORTABLE (1 VIEW): CPT

## 2017-07-17 PROCEDURE — 96374 THER/PROPH/DIAG INJ IV PUSH: CPT

## 2017-07-17 PROCEDURE — 700105 HCHG RX REV CODE 258: Performed by: OBSTETRICS & GYNECOLOGY

## 2017-07-17 PROCEDURE — 85576 BLOOD PLATELET AGGREGATION: CPT

## 2017-07-17 PROCEDURE — 700102 HCHG RX REV CODE 250 W/ 637 OVERRIDE(OP): Performed by: OBSTETRICS & GYNECOLOGY

## 2017-07-17 RX ORDER — IBUPROFEN 800 MG/1
800 TABLET ORAL
Status: DISCONTINUED | OUTPATIENT
Start: 2017-07-17 | End: 2017-07-18 | Stop reason: HOSPADM

## 2017-07-17 RX ORDER — SODIUM CHLORIDE 9 MG/ML
1000 INJECTION, SOLUTION INTRAVENOUS ONCE
Status: COMPLETED | OUTPATIENT
Start: 2017-07-17 | End: 2017-07-17

## 2017-07-17 RX ORDER — SODIUM CHLORIDE, SODIUM LACTATE, POTASSIUM CHLORIDE, CALCIUM CHLORIDE 600; 310; 30; 20 MG/100ML; MG/100ML; MG/100ML; MG/100ML
1000 INJECTION, SOLUTION INTRAVENOUS CONTINUOUS
Status: DISCONTINUED | OUTPATIENT
Start: 2017-07-17 | End: 2017-07-18 | Stop reason: HOSPADM

## 2017-07-17 RX ORDER — ACETAMINOPHEN 325 MG/1
650 TABLET ORAL ONCE
Status: COMPLETED | OUTPATIENT
Start: 2017-07-17 | End: 2017-07-17

## 2017-07-17 RX ORDER — ONDANSETRON 2 MG/ML
4 INJECTION INTRAMUSCULAR; INTRAVENOUS EVERY 6 HOURS PRN
Status: DISCONTINUED | OUTPATIENT
Start: 2017-07-17 | End: 2017-07-18 | Stop reason: HOSPADM

## 2017-07-17 RX ORDER — SIMETHICONE 80 MG
80 TABLET,CHEWABLE ORAL EVERY 8 HOURS PRN
Status: DISCONTINUED | OUTPATIENT
Start: 2017-07-17 | End: 2017-07-18 | Stop reason: HOSPADM

## 2017-07-17 RX ORDER — SODIUM CHLORIDE 9 MG/ML
2000 INJECTION, SOLUTION INTRAVENOUS CONTINUOUS
Status: DISCONTINUED | OUTPATIENT
Start: 2017-07-17 | End: 2017-07-18 | Stop reason: HOSPADM

## 2017-07-17 RX ORDER — OXYCODONE HYDROCHLORIDE 10 MG/1
10 TABLET ORAL
Status: DISCONTINUED | OUTPATIENT
Start: 2017-07-17 | End: 2017-07-18 | Stop reason: HOSPADM

## 2017-07-17 RX ORDER — ACETAMINOPHEN 500 MG
1000 TABLET ORAL EVERY 6 HOURS
Status: DISCONTINUED | OUTPATIENT
Start: 2017-07-17 | End: 2017-07-18 | Stop reason: HOSPADM

## 2017-07-17 RX ORDER — SODIUM CHLORIDE 9 MG/ML
INJECTION, SOLUTION INTRAVENOUS CONTINUOUS
Status: DISCONTINUED | OUTPATIENT
Start: 2017-07-17 | End: 2017-07-18 | Stop reason: HOSPADM

## 2017-07-17 RX ORDER — AMOXICILLIN 250 MG
2 CAPSULE ORAL
Status: DISCONTINUED | OUTPATIENT
Start: 2017-07-17 | End: 2017-07-18 | Stop reason: HOSPADM

## 2017-07-17 RX ORDER — SODIUM CHLORIDE 9 MG/ML
INJECTION, SOLUTION INTRAVENOUS
Status: COMPLETED
Start: 2017-07-17 | End: 2017-07-17

## 2017-07-17 RX ORDER — MEPERIDINE HYDROCHLORIDE 25 MG/ML
50 INJECTION INTRAMUSCULAR; INTRAVENOUS; SUBCUTANEOUS EVERY 4 HOURS PRN
Status: DISCONTINUED | OUTPATIENT
Start: 2017-07-17 | End: 2017-07-18 | Stop reason: HOSPADM

## 2017-07-17 RX ADMIN — OXYCODONE HYDROCHLORIDE 10 MG: 10 TABLET ORAL at 15:11

## 2017-07-17 RX ADMIN — CEFAZOLIN SODIUM 1 G: 1 INJECTION, SOLUTION INTRAVENOUS at 22:00

## 2017-07-17 RX ADMIN — IBUPROFEN 800 MG: 800 TABLET, FILM COATED ORAL at 11:46

## 2017-07-17 RX ADMIN — SODIUM CHLORIDE 1000 ML: 9 INJECTION, SOLUTION INTRAVENOUS at 02:55

## 2017-07-17 RX ADMIN — CEFAZOLIN SODIUM 1 G: 1 INJECTION, SOLUTION INTRAVENOUS at 11:45

## 2017-07-17 RX ADMIN — OXYCODONE HYDROCHLORIDE 10 MG: 10 TABLET ORAL at 11:59

## 2017-07-17 RX ADMIN — SODIUM CHLORIDE, POTASSIUM CHLORIDE, SODIUM LACTATE AND CALCIUM CHLORIDE 1000 ML: 600; 310; 30; 20 INJECTION, SOLUTION INTRAVENOUS at 10:20

## 2017-07-17 RX ADMIN — SODIUM CHLORIDE: 9 INJECTION, SOLUTION INTRAVENOUS at 04:00

## 2017-07-17 RX ADMIN — IBUPROFEN 800 MG: 800 TABLET, FILM COATED ORAL at 07:49

## 2017-07-17 RX ADMIN — SODIUM CHLORIDE: 9 INJECTION, SOLUTION INTRAVENOUS at 03:15

## 2017-07-17 RX ADMIN — ACETAMINOPHEN 1000 MG: 500 TABLET ORAL at 17:22

## 2017-07-17 RX ADMIN — IBUPROFEN 800 MG: 800 TABLET, FILM COATED ORAL at 17:22

## 2017-07-17 RX ADMIN — OXYCODONE HYDROCHLORIDE 10 MG: 10 TABLET ORAL at 07:49

## 2017-07-17 RX ADMIN — ONDANSETRON 4 MG: 2 INJECTION INTRAMUSCULAR; INTRAVENOUS at 10:52

## 2017-07-17 RX ADMIN — OXYCODONE HYDROCHLORIDE 10 MG: 10 TABLET ORAL at 19:47

## 2017-07-17 RX ADMIN — ACETAMINOPHEN 1000 MG: 500 TABLET ORAL at 11:46

## 2017-07-17 RX ADMIN — ACETAMINOPHEN 650 MG: 325 TABLET, FILM COATED ORAL at 07:49

## 2017-07-17 RX ADMIN — FENTANYL CITRATE 50 MCG: 50 INJECTION, SOLUTION INTRAMUSCULAR; INTRAVENOUS at 03:31

## 2017-07-17 RX ADMIN — SODIUM CHLORIDE 2000 ML: 9 INJECTION, SOLUTION INTRAVENOUS at 03:15

## 2017-07-17 ASSESSMENT — PAIN SCALES - GENERAL
PAINLEVEL_OUTOF10: 0
PAINLEVEL_OUTOF10: 0
PAINLEVEL_OUTOF10: 7
PAINLEVEL_OUTOF10: 7
PAINLEVEL_OUTOF10: 0
PAINLEVEL_OUTOF10: 2
PAINLEVEL_OUTOF10: 0
PAINLEVEL_OUTOF10: 10
PAINLEVEL_OUTOF10: 0
PAINLEVEL_OUTOF10: 2
PAINLEVEL_OUTOF10: 10
PAINLEVEL_OUTOF10: 3
PAINLEVEL_OUTOF10: 0

## 2017-07-17 ASSESSMENT — LIFESTYLE VARIABLES
ALCOHOL_USE: NO
DO YOU DRINK ALCOHOL: NO
EVER_SMOKED: YES

## 2017-07-17 NOTE — ED NOTES
Dr Rebolledo at bedside for central line placement. Sterile technique used. Time-out at 0315. Pt tolerated procedure well.

## 2017-07-17 NOTE — IP AVS SNAPSHOT
" Home Care Instructions                                                                                                                  Name:Karin Richard  Medical Record Number:[id number]  CSN: [id number]    YOB: 1985   Age: 32 y.o.  Sex: female  HT:1.702 m (5' 7\") WT: 76.658 kg (169 lb)          Admit Date: 7/17/2017     Discharge Date:   Today's Date: 7/18/2017  Attending Doctor:  Buck Wright M.D.                  Allergies:  Nkda            Discharge Instructions       Discharge Instructions    Discharged to home by car with relative. Discharged via wheelchair, hospital escort: Yes.  Special equipment needed: Not Applicable    Be sure to schedule a follow-up appointment with your primary care doctor or any specialists as instructed.     Discharge Plan:   Smoking Cessation Offered: Patient Counseled  Influenza Vaccine Indication: Indicated: Not available from distributor/    I understand that a diet low in cholesterol, fat, and sodium is recommended for good health. Unless I have been given specific instructions below for another diet, I accept this instruction as my diet prescription.   Other diet: regular    Special Instructions:   F/U with Dr. Wrihgt Friday 12:30  Call or return to the Emergency Department for fever, severe abdominal pain more than cramping, bleeding through more than 1 pad per hour for more that 2 consecutive hours or foul smelling vaginal discharge or signs of infection from of the wound.    · Is patient discharged on Warfarin / Coumadin?   No     · Is patient Post Blood Transfusion?  Yes      Depression / Suicide Risk    As you are discharged from this [facility] facility, it is important to learn how to keep safe from harming yourself.    Recognize the warning signs:  · Abrupt changes in personality, positive or negative- including increase in energy   · Giving away possessions  · Change in eating patterns- significant weight changes-  positive or " negative  · Change in sleeping patterns- unable to sleep or sleeping all the time   · Unwillingness or inability to communicate  · Depression  · Unusual sadness, discouragement and loneliness  · Talk of wanting to die  · Neglect of personal appearance   · Rebelliousness- reckless behavior  · Withdrawal from people/activities they love  · Confusion- inability to concentrate     If you or a loved one observes any of these behaviors or has concerns about self-harm, here's what you can do:  · Talk about it- your feelings and reasons for harming yourself  · Remove any means that you might use to hurt yourself (examples: pills, rope, extension cords, firearm)  · Get professional help from the community (Mental Health, Substance Abuse, psychological counseling)  · Do not be alone:Call your Safe Contact- someone whom you trust who will be there for you.  · Call your local CRISIS HOTLINE 597-9935 or 961-922-6570  · Call your local Children's Mobile Crisis Response Team [place] (557) 169-6376 or wwwPVPower  · Call the toll free National Suicide Prevention Hotlines   · National Suicide Prevention Lifeline 901-075-HOMO (1631)  · National Hope Line Network 800-SUICIDE (793-3377)        Follow-up Information     1. Follow up with Buck Wright M.D. On 7/21/2017.    Specialty:  OB/Gyn    Why:  For follow up  _ Friday 7/21/17 at 12:30 pm    Contact information    [address] [facility] [address]  [facility] [address]  [phone]           Discharge Medication Instructions:    Below are the medications your physician expects you to take upon discharge:    Review all your home medications and newly ordered medications with your doctor and/or pharmacist. Follow medication instructions as directed by your doctor and/or pharmacist.    Please keep your medication list with you and share with your physician.               Medication List      START taking these medications        Instructions    Morning Afternoon Evening Bedtime     docusate sodium 100 MG Caps   Commonly known as:  COLACE   Next Dose Due:  As needed when taking narcotics        Take 1 Cap by mouth 2 times a day. While on narcotic then PRN constipation   Dose:  100 mg                        ibuprofen 600 MG Tabs   Last time this was given:  800 mg on 7/18/2017  6:02 AM   Commonly known as:  MOTRIN   Next Dose Due:  As needed        Take 1 Tab by mouth every 6 hours as needed.   Dose:  600 mg                        oxycodone-acetaminophen 5-325 MG Tabs   Commonly known as:  PERCOCET   Next Dose Due:  As needed        Take 1-2 Tabs by mouth every four hours as needed.   Dose:  1-2 Tab                          CONTINUE taking these medications        Instructions    Morning Afternoon Evening Bedtime    amoxicillin 500 MG Caps   Commonly known as:  AMOXIL   Next Dose Due:  Start today        Take 1 Cap by mouth 3 times a day for 7 days.   Dose:  500 mg                        sulfamethoxazole-trimethoprim 800-160 MG tablet   Commonly known as:  BACTRIM DS   Next Dose Due:  Start today        Take 1 Tab by mouth 2 times a day for 7 days.   Dose:  1 Tab                             Where to Get Your Medications      Information about where to get these medications is not yet available     ! Ask your nurse or doctor about these medications    - docusate sodium 100 MG Caps  - ibuprofen 600 MG Tabs  - oxycodone-acetaminophen 5-325 MG Tabs            Instructions           Diet / Nutrition:    Follow any diet instructions given to you by your doctor or the dietician, including how much salt (sodium) you are allowed each day.    If you are overweight, talk to your doctor about a weight reduction plan.    Activity:    Remain physically active following your doctor's instructions about exercise and activity.    Rest often.     Any time you become even a little tired or short of breath, SIT DOWN and rest.    Worsening Symptoms:    Report any of the following signs and symptoms to the  doctor's office immediately:    *Pain of jaw, arm, or neck  *Chest pain not relieved by medication                               *Dizziness or loss of consciousness  *Difficulty breathing even when at rest   *More tired than usual                                       *Bleeding drainage or swelling of surgical site  *Swelling of feet, ankles, legs or stomach                 *Fever (>100ºF)  *Pink or blood tinged sputum  *Weight gain (3lbs/day or 5lbs /week)           *Shock from internal defibrillator (if applicable)  *Palpitations or irregular heartbeats                *Cool and/or numb extremities    Stroke Awareness    Common Risk Factors for Stroke include:    Age  Atrial Fibrillation  Carotid Artery Stenosis  Diabetes Mellitus  Excessive alcohol consumption  High blood pressure  Overweight   Physical inactivity  Smoking    Warning signs and symptoms of a stroke include:    *Sudden numbness or weakness of the face, arm or leg (especially on one side of the body).  *Sudden confusion, trouble speaking or understanding.  *Sudden trouble seeing in one or both eyes.  *Sudden trouble walking, dizziness, loss of balance or coordination.Sudden severe headache with no known cause.    It is very important to get treatment quickly when a stroke occurs. If you experience any of the above warning signs, call 911 immediately.                   Disclaimer         Quit Smoking / Tobacco Use:    I understand the use of any tobacco products increases my chance of suffering from future heart disease or stroke and could cause other illnesses which may shorten my life. Quitting the use of tobacco products is the single most important thing I can do to improve my health. For further information on smoking / tobacco cessation call a Toll Free Quit Line at 1-898.932.5003 (*National Cancer [place]) or 1-337.339.1522 (American Lung Association) or you can access the web based program at www.lungusa.org.    [place] Tobacco Users Help  Line:  (794) 351-6327       Toll Free: 0-639-100-5261  Quit Tobacco Program [facility] (947)557-8107    Crisis Hotline:    [employer] Crisis Hotline:  4-886-SWCPDME or 1-381.586.4162    [place] Crisis Hotline:    1-993.587.8038 or 927-625-6411    Discharge Survey:   Thank you for choosing [facility]. We hope we did everything we could to make your hospital stay a pleasant one. You may be receiving a phone survey and we would appreciate your time and participation in answering the questions. Your input is very valuable to us in our efforts to improve our service to our patients and their families.        My signature on this form indicates that:    1. I have reviewed and understand the above information.  2. My questions regarding this information have been answered to my satisfaction.  3. I have formulated a plan with my discharge nurse to obtain my prescribed medications for home.                  Disclaimer         __________________________________                     __________       ________                       Patient Signature                                                 Date                    Time

## 2017-07-17 NOTE — IP AVS SNAPSHOT
7/18/2017    Karin Richard  [address] [place] [address]  [place] [address]    Dear Karin:    [facility] wants to ensure your discharge home is safe and you or your loved ones have had all of your questions answered regarding your care after you leave the hospital.    Below is a list of resources and contact information should you have any questions regarding your hospital stay, follow-up instructions, or active medical symptoms.    Questions or Concerns Regarding… Contact   Medical Questions Related to Your Discharge  (7 days a week, 8am-5pm) Contact a Nurse Care Coordinator   [phone]   Medical Questions Not Related to Your Discharge  (24 hours a day / 7 days a week)  Contact the Nurse Health Line   [phone]    Medications or Discharge Instructions Refer to your discharge packet   or contact your [facility] Primary Care Provider   [phone]   Follow-up Appointment(s) Schedule your appointment via Solvate   or contact Scheduling [phone]   Billing Review your statement via Solvate  or contact Billing [phone]   Medical Records Review your records via Solvate   or contact Medical Records [phone]     You may receive a telephone call within two days of discharge. This call is to make certain you understand your discharge instructions and have the opportunity to have any questions answered. You can also easily access your medical information, test results and upcoming appointments via the Solvate free online health management tool. You can learn more and sign up at [employer]Solvate. For assistance setting up your Solvate account, please call [phone].    Once again, we want to ensure your discharge home is safe and that you have a clear understanding of any next steps in your care. If you have any questions or concerns, please do not hesitate to contact us, we are here for you. Thank you for choosing [facility] for your healthcare needs.    Sincerely,    Your [facility] Healthcare Team

## 2017-07-17 NOTE — ED NOTES
Pt w/c to [facility].   Agree with triage note. Pt is tearful and c/o lower back and abd pain.   Pt states she took an at home pregnancy test tonight with positive results. Pt states her last menstrual cycle was 1 month ago. Pt has hx of miscarriage, 2 years ago.  Chart up and ready for ERP.

## 2017-07-17 NOTE — OP REPORT
Operative Report    Date: 7/17/2017    Surgeon: Brooklynn Ott    Assistant: none    Anesthesiologist: Dr. Uri lawson    Pre-operative Diagnosis: Pelvic pain, hemoperitoneum, cannot r/o ectopic pregnancy    Post-operative Diagnosis: same as above    Procedure: minilaparotomy, evacuation of hemoperitoneum and left salpingectomy    Indications:   Pelvic pain and active bleeding    Findings: large 2 liter hemoperitoneum, actively bleeding left fallopian tube ruptured ectopic    Procedure in detail: Patient was taken to the operating room after informed consent was obtained and all risks benefits and indications and alternatives have been discussed with the patient and her boyfriend prior. Gen. anesthesia was administered without difficulty. Patient was prepped and draped in the usual sterile fashion. A Dudley catheter had been placed timeout was called to verify patient procedure a mini laparotomy incision was performed 2 cm above the symphysis pubis. This was carried down to the fascia which was incised transversely with the scalpel. The peritoneum peritoneum was tented And sharply with Metzenbaum scissors. A large amount of blood was encountered and clot this was removed approximately 2 L of blood clot were removed from the patient's abdomen after abdomen where abdomen and pelvis. We able to now for retractor into the incision retracted bowel and bladder. Moist lap sponges were placed to retract bowel. The uterus was identified and right fallopian tube appeared within normal limits and it was followed to the fimbriated end. Right ovary. Normal. Left ovary was identified. Cleft lip and tube was actively bleeding. The rupture site on the distal end of the fallopian tube with a mass within the fallopian tube. 2  [place] clamps were placed in the mesosalpinx and the fallopian tube and ectopic were excised. 2 2-0 Vicryl stitches were placed to tie off the clamp. Excellent hemostasis was noted. Inspection of the rest of  the pelvis appeared within normal limits. Patient was placed in reverse Trendelenburg to try to remove any clot and debris in the upper abdomen copious irrigation was once again performed. Inspection of the left pedicle appeared hemostatic. The peritoneum was then closed with a running locked running stitch of 2-0 Vicryl. Fascia was closed with a running stitch of 0 Vicryl. Subcutaneous area was irrigated Bovie electrocautery was used for any bleeders. The subcutaneous area was reapproximated with 2-0 Vicryl. Subcuticular stitch of 4-0 Vicryl sutures to close the skin. Patient tolerated procedure well she received a unit of blood in the ER prior to beginning her surgery and a red box had been ordered. Her hematocrit in the OR was below 15  and a second unit was reported to be hung. We'll order a type panel to evaluate her need for further blood products.    EBL: 2 liters    UOP: 80cc    Drains: none    Dispo: stble to recovery room

## 2017-07-17 NOTE — ED NOTES
Patient transported to OR via gurney accompanied by family, transport, and this RN. Report given at bedside to OR RN.

## 2017-07-17 NOTE — ED NOTES
Chief Complaint   Patient presents with   • Abdominal Pain     Pt reports diffuse abd pain approx 1-2 hours ago. Pt reports early pregnancy, period last month. .     /135 mmHg  Pulse 136  Temp(Src) 37 °C (98.6 °F)  Resp 18  Wt 76.658 kg (169 lb)  SpO2 96%  LMP 2017 (Approximate)    Pt brought into triage by WC, complaints as above. Pt c/o that her abd is esme and she feels pressure, previous miscarriage with similar symptoms. Pt crying and in obvious discomfort. Pt and family taken to [facility] for comfort and privacy. Urine collection cup in a bag, given to patient as well as instructions on their use, pt verbalized understanding and instructed to call RN. VS as above.

## 2017-07-17 NOTE — IP AVS SNAPSHOT
FOXTOWN Access Code: [email or web address]  Expires: 7/30/2017  4:09 AM    FOXTOWN  A secure, online tool to manage your health information     [employer]’s FOXTOWN® is a secure, online tool that connects you to your personalized health information from the privacy of your home -- day or night - making it very easy for you to manage your healthcare. Once the activation process is completed, you can even access your medical information using the FOXTOWN demar, which is available for free in the Apple Demar store or Google Play store.     FOXTOWN provides the following levels of access (as shown below):   My Chart Features   [facility] Primary Care Doctor [facility]  Specialists [facility]  Urgent  Care [facility]  Primary Care  Doctor   Email your healthcare team securely and privately 24/7 X X X X   Manage appointments: schedule your next appointment; view details of past/upcoming appointments X      Request prescription refills. X      View recent personal medical records, including lab and immunizations X X X X   View health record, including health history, allergies, medications X X X X   Read reports about your outpatient visits, procedures, consult and ER notes X X X X   See your discharge summary, which is a recap of your hospital and/or ER visit that includes your diagnosis, lab results, and care plan. X X       How to register for FOXTOWN:  1. Go to  [email or web address].  2. Click on the Sign Up Now box, which takes you to the New Member Sign Up page. You will need to provide the following information:  a. Enter your FOXTOWN Access Code exactly as it appears at the top of this page. (You will not need to use this code after you’ve completed the sign-up process. If you do not sign up before the expiration date, you must request a new code.)   b. Enter your date of birth.   c. Enter your home email address.   d. Click Submit, and follow the next screen’s instructions.  3. Create a FOXTOWN ID. This will be your FOXTOWN  login ID and cannot be changed, so think of one that is secure and easy to remember.  4. Create a [employer] password. You can change your password at any time.  5. Enter your Password Reset Question and Answer. This can be used at a later time if you forget your password.   6. Enter your e-mail address. This allows you to receive e-mail notifications when new information is available in BugBuster.  7. Click Sign Up. You can now view your health information.    For assistance activating your BugBuster account, call [phone]

## 2017-07-17 NOTE — ED NOTES
Uncrossed match blood infusion initiated. Dual RN verified. No infusion reaction at this time.    Unit # [id number]
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
## 2017-07-17 NOTE — PROGRESS NOTES
Received pt from PACU.   Pt reporting 10/10 pain. Medicated with oxy 10.  A&Ox4  abd incision covered with bandage. CDI.  Pt on room air.  Clear liquid diet. Advancing as tolerated.   New labs to be drawn at 1100 post blood transfusion x3.   Latest Hgb 12.5, Hct 38.5.

## 2017-07-17 NOTE — CONSULTS
Consult 2017  Consult for rJ Solares  Reason for consultation-abdominal pain positive pregnancy test    History of present illness-patient is a 32-year-old  7 para 5 female last vaginal delivery is January here at which was uneventful patient had one miscarriage she had a positive pregnancy test yesterday. Her last menstrual period was month ago. She broke. Her complaints of pain she's had no bleeding. The patient was previously patient of Dr. Granado's pregnancy. I was called because a bedside ultrasound revealed a large hemoperitoneum and a positive pregnancy test. Patient appears to have a ruptured ectopic pregnancy.    Past medical history significant only for recent hospitalization or ER visit for a right vulvar abscess she's currently on infection ampicillin. Did not require incision and drainage.    Past surgical history is noncontributory.    She has no known drug allergies.    Current medications Bactrim and  ampicillin.    Obstetrical history 5 term successful vaginal deliveries 1 sab    Social history she is  no alcohol tobacco or IV drug use    Physical exam-patient came in hypotensive upon my arrival within less than 20 min the patient had received 1 negative her pulse had increased to 140s and her blood pressure about 2 below 70 and she was symptomatic    Patient in in iscomfort,er abdomen is distended and she has diffuse abdominal tenderness she has rebound and guarding.    Bimanual exam exquisite tenderness bedside transvaginal ultrasound shows an abdomen full of blood and clots no intrauterine pregnancy with beta-hCG of 14,000 her hematocrit is 30/8/19      Impression patient is patient has a hemoperitoneum the positive pregnancy test no intrauterine pregnancy consistent with a ruptured ectopic pregnancy and hemoperitoneum plan is to proceed with operating room for exploratory laparotomy possible right or left salpingectomy and evacuation of hemoperitoneum masses possibility  of removing her left patient is a hair as risks risks and wished to proceed she understands the risks of infection bleeding transfusion patient's visit to abdominal organ injury to bowel bladder risk of pelvic pain and infertility risks of general anesthesia including death QUESTIONS were answered and informed

## 2017-07-17 NOTE — ED NOTES
Pt became nauseous, diaphoretic and states she felt like she was going to pass out. Pt also states she is currently being treated with abx for an abscess. Charge RN notified. Pt to [facility].
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
## 2017-07-17 NOTE — IP AVS SNAPSHOT
" <p align=\"LEFT\"><IMG SRC=\"//[email or web address]$/[email or web address]\" alt=\"Image\" WIDTH=\"50%\" HEIGHT=\"200\" BORDER=\"\"></p>                   Name:Karin Richard  Medical Record Number:[id number]  CSN: [id number]    YOB: 1985   Age: 32 y.o.  Sex: female  HT:1.702 m (5' 7\") WT: 76.658 kg (169 lb)          Admit Date: 7/17/2017     Discharge Date:   Today's Date: 7/18/2017  Attending Doctor:  Buck Wright M.D.                  Allergies:  Nkda          Follow-up Information     1. Follow up with Buck Wright M.D. On 7/21/2017.    Specialty:  OB/Gyn    Why:  For follow up  _ Friday 7/21/17 at 12:30 pm    Contact information    [address] [address]  [phone]           Medication List      Take these Medications        Instructions    amoxicillin 500 MG Caps   Commonly known as:  AMOXIL    Take 1 Cap by mouth 3 times a day for 7 days.   Dose:  500 mg       docusate sodium 100 MG Caps   Commonly known as:  COLACE    Take 1 Cap by mouth 2 times a day. While on narcotic then PRN constipation   Dose:  100 mg       ibuprofen 600 MG Tabs   Commonly known as:  MOTRIN    Take 1 Tab by mouth every 6 hours as needed.   Dose:  600 mg       oxycodone-acetaminophen 5-325 MG Tabs   Commonly known as:  PERCOCET    Take 1-2 Tabs by mouth every four hours as needed.   Dose:  1-2 Tab       sulfamethoxazole-trimethoprim 800-160 MG tablet   Commonly known as:  BACTRIM DS    Take 1 Tab by mouth 2 times a day for 7 days.   Dose:  1 Tab         "

## 2017-07-17 NOTE — OR NURSING
Dr. Ott called with CBC results. Orders received to repeat labs in 4 hours. Orders placed and Ellie, Receiving RN notified. VSS. Pt transported to [facility].

## 2017-07-18 VITALS
TEMPERATURE: 97.9 F | RESPIRATION RATE: 18 BRPM | BODY MASS INDEX: 26.53 KG/M2 | SYSTOLIC BLOOD PRESSURE: 94 MMHG | OXYGEN SATURATION: 96 % | HEIGHT: 67 IN | DIASTOLIC BLOOD PRESSURE: 56 MMHG | HEART RATE: 78 BPM | WEIGHT: 169 LBS

## 2017-07-18 LAB
ERYTHROCYTE [DISTWIDTH] IN BLOOD BY AUTOMATED COUNT: 47.7 FL (ref 35.9–50)
HCT VFR BLD AUTO: 23.7 % (ref 37–47)
HGB BLD-MCNC: 8.3 G/DL (ref 12–16)
MCH RBC QN AUTO: 30.5 PG (ref 27–33)
MCHC RBC AUTO-ENTMCNC: 35 G/DL (ref 33.6–35)
MCV RBC AUTO: 87.1 FL (ref 81.4–97.8)
PLATELET # BLD AUTO: 131 K/UL (ref 164–446)
PMV BLD AUTO: 10 FL (ref 9–12.9)
RBC # BLD AUTO: 2.72 M/UL (ref 4.2–5.4)
WBC # BLD AUTO: 14.6 K/UL (ref 4.8–10.8)

## 2017-07-18 PROCEDURE — 700111 HCHG RX REV CODE 636 W/ 250 OVERRIDE (IP): Performed by: OBSTETRICS & GYNECOLOGY

## 2017-07-18 PROCEDURE — 36415 COLL VENOUS BLD VENIPUNCTURE: CPT

## 2017-07-18 PROCEDURE — 96376 TX/PRO/DX INJ SAME DRUG ADON: CPT

## 2017-07-18 PROCEDURE — A9270 NON-COVERED ITEM OR SERVICE: HCPCS | Performed by: OBSTETRICS & GYNECOLOGY

## 2017-07-18 PROCEDURE — G0378 HOSPITAL OBSERVATION PER HR: HCPCS

## 2017-07-18 PROCEDURE — 700102 HCHG RX REV CODE 250 W/ 637 OVERRIDE(OP): Performed by: OBSTETRICS & GYNECOLOGY

## 2017-07-18 PROCEDURE — 85027 COMPLETE CBC AUTOMATED: CPT

## 2017-07-18 RX ORDER — DOCUSATE SODIUM 100 MG/1
100 CAPSULE, LIQUID FILLED ORAL 2 TIMES DAILY
Qty: 60 CAP | Refills: 1 | Status: SHIPPED | OUTPATIENT
Start: 2017-07-18 | End: 2018-11-01

## 2017-07-18 RX ORDER — IBUPROFEN 600 MG/1
600 TABLET ORAL EVERY 6 HOURS PRN
Qty: 60 TAB | Refills: 1 | Status: SHIPPED | OUTPATIENT
Start: 2017-07-18 | End: 2018-12-26

## 2017-07-18 RX ORDER — OXYCODONE HYDROCHLORIDE AND ACETAMINOPHEN 5; 325 MG/1; MG/1
1-2 TABLET ORAL EVERY 4 HOURS PRN
Qty: 25 TAB | Refills: 0 | Status: SHIPPED | OUTPATIENT
Start: 2017-07-18 | End: 2018-11-01

## 2017-07-18 RX ADMIN — ACETAMINOPHEN 1000 MG: 500 TABLET ORAL at 06:07

## 2017-07-18 RX ADMIN — ONDANSETRON 4 MG: 2 INJECTION INTRAMUSCULAR; INTRAVENOUS at 00:18

## 2017-07-18 RX ADMIN — ACETAMINOPHEN 1000 MG: 500 TABLET ORAL at 00:12

## 2017-07-18 RX ADMIN — OXYCODONE HYDROCHLORIDE 10 MG: 10 TABLET ORAL at 04:06

## 2017-07-18 RX ADMIN — IBUPROFEN 800 MG: 800 TABLET, FILM COATED ORAL at 06:02

## 2017-07-18 RX ADMIN — OXYCODONE HYDROCHLORIDE 10 MG: 10 TABLET ORAL at 08:34

## 2017-07-18 ASSESSMENT — PAIN SCALES - GENERAL
PAINLEVEL_OUTOF10: 5
PAINLEVEL_OUTOF10: 7

## 2017-07-18 NOTE — PROGRESS NOTES
Pt a&o. After ambulating to bathroom pt dressing became very saturated, pressure applied but pt continue to bleed. VS remained stable. Oxy given for pain. rapid responde team came to assess pt. STAT h&h performed. Dr. Ott pagetyler about situation. Dr. Santillan came to assess pt. H&H continued to be monitored overnight. Pressure dressing applied. No drainage since. Pt to stay on bedrest overnight. Voiding in female urinal. Caleb sinha in reach
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
## 2017-07-18 NOTE — CODE DOCUMENTATION
Primary RN, Lashawn, spoke with OBGYN MD, Dr. Ott. New orders received to continue putting pressure on incision site. Monitor VS. MD to call in-house doctor to stop by the room to assess patient.
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
## 2017-07-18 NOTE — DISCHARGE INSTRUCTIONS
Discharge Instructions    Discharged to home by car with relative. Discharged via wheelchair, hospital escort: Yes.  Special equipment needed: Not Applicable    Be sure to schedule a follow-up appointment with your primary care doctor or any specialists as instructed.     Discharge Plan:   Smoking Cessation Offered: Patient Counseled  Influenza Vaccine Indication: Indicated: Not available from distributor/    I understand that a diet low in cholesterol, fat, and sodium is recommended for good health. Unless I have been given specific instructions below for another diet, I accept this instruction as my diet prescription.   Other diet: regular    Special Instructions:   F/U with Dr. Wright Friday 12:30  Call or return to the Emergency Department for fever, severe abdominal pain more than cramping, bleeding through more than 1 pad per hour for more that 2 consecutive hours or foul smelling vaginal discharge or signs of infection from of the wound.    · Is patient discharged on Warfarin / Coumadin?   No     · Is patient Post Blood Transfusion?  Yes      Depression / Suicide Risk    As you are discharged from this [facility] facility, it is important to learn how to keep safe from harming yourself.    Recognize the warning signs:  · Abrupt changes in personality, positive or negative- including increase in energy   · Giving away possessions  · Change in eating patterns- significant weight changes-  positive or negative  · Change in sleeping patterns- unable to sleep or sleeping all the time   · Unwillingness or inability to communicate  · Depression  · Unusual sadness, discouragement and loneliness  · Talk of wanting to die  · Neglect of personal appearance   · Rebelliousness- reckless behavior  · Withdrawal from people/activities they love  · Confusion- inability to concentrate     If you or a loved one observes any of these behaviors or has concerns about self-harm, here's what you can do:  · Talk about  it- your feelings and reasons for harming yourself  · Remove any means that you might use to hurt yourself (examples: pills, rope, extension cords, firearm)  · Get professional help from the community (Mental Health, Substance Abuse, psychological counseling)  · Do not be alone:Call your Safe Contact- someone whom you trust who will be there for you.  · Call your local CRISIS HOTLINE 243-6316 or 821-349-0461  · Call your local Children's Mobile Crisis Response Team [place] (394) 636-4654 or www.ServiceMesh  · Call the toll free National Suicide Prevention Hotlines   · National Suicide Prevention Lifeline 609-752-SYNE (8678)  · National Hope Line Network 800-SUICIDE (885-4859)

## 2017-07-18 NOTE — CARE PLAN
Problem: Pain Management  Goal: Pain level will decrease to patient’s comfort goal  Intervention: Follow pain managment plan developed in collaboration with patient and Interdisciplinary Team  Pain controlled with PRN medications       Problem: Skin Integrity  Goal: Risk for impaired skin integrity will decrease  Intervention: Assess and monitor skin integrity, appearance and/or temperature  Pt dressing monitored closely for any drainage. Pt to report any dizziness/light headedness
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
## 2017-07-18 NOTE — PROGRESS NOTES
Bedside report received. Patient A&O X 3, VS WNL,  RA,  Voids- bathroom, Activity- walks independently, Diet- regular. Complains of pain pt sleeping. POC discussed with patient. Pt verbalized understanding. Call light and belongings with in reach.  Bed locked and in lowest position, alarm and fall precautions in place.

## 2017-10-06 ENCOUNTER — HOSPITAL ENCOUNTER (EMERGENCY)
Facility: MEDICAL CENTER | Age: 32
End: 2017-10-06
Attending: EMERGENCY MEDICINE
Payer: MEDICAID

## 2017-10-06 VITALS
HEIGHT: 67 IN | HEART RATE: 67 BPM | BODY MASS INDEX: 26.68 KG/M2 | TEMPERATURE: 98.7 F | WEIGHT: 169.97 LBS | RESPIRATION RATE: 16 BRPM | OXYGEN SATURATION: 97 % | SYSTOLIC BLOOD PRESSURE: 102 MMHG | DIASTOLIC BLOOD PRESSURE: 56 MMHG

## 2017-10-06 DIAGNOSIS — N91.2 AMENORRHEA: ICD-10-CM

## 2017-10-06 DIAGNOSIS — H92.01 RIGHT EAR PAIN: ICD-10-CM

## 2017-10-06 LAB — HCG UR QL: NEGATIVE

## 2017-10-06 PROCEDURE — 81025 URINE PREGNANCY TEST: CPT

## 2017-10-06 PROCEDURE — 99283 EMERGENCY DEPT VISIT LOW MDM: CPT

## 2017-10-06 RX ORDER — IBUPROFEN 600 MG/1
600 TABLET ORAL ONCE
Status: DISCONTINUED | OUTPATIENT
Start: 2017-10-06 | End: 2017-10-06 | Stop reason: HOSPADM

## 2017-10-06 ASSESSMENT — LIFESTYLE VARIABLES: DO YOU DRINK ALCOHOL: NO

## 2017-10-06 ASSESSMENT — PAIN SCALES - GENERAL: PAINLEVEL_OUTOF10: 0

## 2017-10-07 NOTE — ED PROVIDER NOTES
ED Provider Note  I was unable to examine the patient after she was roomed. The patient eloped prior to me being able to evaluate her. The 4th her medical student Yamilex Jaramillo went in and took an initial history from the patient. When I went in to do a follow-up exam the patient had eloped.

## 2017-10-07 NOTE — ED NOTES
"Karin Richard  Chief Complaint   Patient presents with   • Ear Pain     (R) x 1 week   • Amenorrhea     x 1 month, no pregnancy test done.    • Other     \"had a weird episode of rocking and eyes rolling back\" could hear SO and couldn't stop self.  lasted couple of minutes.      Pt ambulatory to triage with above multiple complaints. VSS. Denies drugs or alcohol use.   Pt returned to lob, educated on triage process, and to inform staff of any changes or concerns.       "

## 2018-11-01 ENCOUNTER — HOSPITAL ENCOUNTER (EMERGENCY)
Facility: MEDICAL CENTER | Age: 33
End: 2018-11-02
Attending: EMERGENCY MEDICINE
Payer: MEDICAID

## 2018-11-01 DIAGNOSIS — L02.31 ABSCESS OF BUTTOCK, RIGHT: ICD-10-CM

## 2018-11-01 PROCEDURE — 99283 EMERGENCY DEPT VISIT LOW MDM: CPT

## 2018-11-01 PROCEDURE — 700101 HCHG RX REV CODE 250: Performed by: EMERGENCY MEDICINE

## 2018-11-01 PROCEDURE — 303977 HCHG I & D

## 2018-11-01 RX ORDER — SULFAMETHOXAZOLE AND TRIMETHOPRIM 800; 160 MG/1; MG/1
1 TABLET ORAL 2 TIMES DAILY
Qty: 20 TAB | Refills: 0 | Status: SHIPPED | OUTPATIENT
Start: 2018-11-01 | End: 2018-11-11

## 2018-11-01 RX ORDER — LIDOCAINE HYDROCHLORIDE AND EPINEPHRINE 10; 10 MG/ML; UG/ML
5 INJECTION, SOLUTION INFILTRATION; PERINEURAL ONCE
Status: COMPLETED | OUTPATIENT
Start: 2018-11-01 | End: 2018-11-01

## 2018-11-01 RX ORDER — CEPHALEXIN 500 MG/1
500 CAPSULE ORAL 4 TIMES DAILY
Qty: 40 CAP | Refills: 0 | Status: SHIPPED | OUTPATIENT
Start: 2018-11-01 | End: 2018-11-11

## 2018-11-01 RX ADMIN — LIDOCAINE HYDROCHLORIDE AND EPINEPHRINE 5 ML: 10; 10 INJECTION, SOLUTION INFILTRATION; PERINEURAL at 23:30

## 2018-11-01 ASSESSMENT — PAIN SCALES - GENERAL: PAINLEVEL_OUTOF10: 5

## 2018-11-02 VITALS
DIASTOLIC BLOOD PRESSURE: 57 MMHG | SYSTOLIC BLOOD PRESSURE: 104 MMHG | RESPIRATION RATE: 20 BRPM | OXYGEN SATURATION: 95 % | BODY MASS INDEX: 30.41 KG/M2 | HEART RATE: 85 BPM | TEMPERATURE: 98.7 F | HEIGHT: 68 IN | WEIGHT: 200.62 LBS

## 2018-11-02 ASSESSMENT — PAIN SCALES - GENERAL: PAINLEVEL_OUTOF10: 0

## 2018-11-02 NOTE — ED TRIAGE NOTES
"Chief Complaint   Patient presents with   • Abscess     Patient ambulatory to triage. States that a \"bump\" has been developing on her Right buttocks. She states that it is about the size of a quarter and is becoming painful and hot. Patient has had groin abscesses in the past that required an I&D. /70   Pulse 86   Temp 36.3 °C (97.3 °F)   Resp 20   Ht 1.721 m (5' 7.75\")   Wt 91 kg (200 lb 9.9 oz)   LMP 10/24/2018 (Exact Date)   SpO2 95%   BMI 30.73 kg/m²     "

## 2018-11-02 NOTE — ED PROVIDER NOTES
ED Provider Note    CHIEF COMPLAINT  Chief Complaint   Patient presents with   • Abscess        [facility]    Primary care provider: Farshad Hatch M.D.   History obtained from: Patient  History limited by: None     Karin Richard is a 33 y.o. female who presents to the ED with her  complaining of possible abscess on the right buttock that started 2 days ago.  Patient thought that she may have been bitten by a spider but did not actually see a spider.  It has been progressively increasing in size and pain.  She denies fever/shortness breath or difficulty breathing/nausea/vomiting/diarrhea/constipation/dysuria.  She denies possibility of pregnancy.  Patient states that she tried to pick at it and only a little bit of drainage occurred.    REVIEW OF SYSTEMS  Please see HPI for pertinent positives/negatives.     PAST MEDICAL HISTORY  Past Medical History:   Diagnosis Date   • Urinary, incontinence, stress female previously scheduled for surgery with Dr Singh but canceled on the day of 3/20/2015   • Arthritis 8/2014   • Alcohol abuse 3/19/2013   • H/O hypotension 2008   • Heart murmur 2002    Pt was told had heart murmur with pregnancy 2008   • Abnormal Pap smear of vagina and vaginal HPV     2008, HAD LEAP PROCEDURE DONE    • Cold     nasal congestion, green sputum   • Headache(784.0)     MIGRAINES 2014, STARTED AFTER SEEING CHIROPRACTOR   • HPV in female    • Kidney disease 2208    was told kidneys shutting down when pregnant,  improved after  pregnancy 2008   • Other specified symptom associated with female genital organs    • Unspecified urinary incontinence         SURGICAL HISTORY  Past Surgical History:   Procedure Laterality Date   • MINI LAPAROTOMY  7/17/2017    Procedure: MINI LAPAROTOMY;  Surgeon: Brooklynn Ott M.D.;  Location: [facility];  Service:    • SALPINGECTOMY Left 7/17/2017    Procedure: LEFT SALPINGECTOMY;  Surgeon: Brooklynn Ott M.D.;  Location: [facility];  Service:      "    SOCIAL HISTORY  Social History     Social History Main Topics   • Smoking status: Former Smoker     Packs/day: 0.25     Years: 4.00     Types: Cigarettes     Quit date: 5/14/2016   • Smokeless tobacco: Never Used      Comment: Pt. states smoked for 4yrs off and on would stop during pregnancy   • Alcohol use No      Comment: quit 1 yr ago   • Drug use: No   • Sexual activity: Yes     Partners: Male      Comment: NUVA RING         FAMILY HISTORY  Family History   Problem Relation Age of Onset   • Alcohol/Drug Mother         alcoholic, pt. states mom has kidney failure due to alcohol   • Diabetes Father         pills   • Hypertension Father    • Other Father         anxiety   • Thyroid Maternal Grandmother    • Other Paternal Grandmother         Pt. states grandma has low blood sugars and anxiety   • Diabetes Paternal Grandfather    • Other Paternal Grandfather         emphysema        CURRENT MEDICATIONS  Home Medications     Reviewed by Johanna White R.N. (Registered Nurse) on 11/01/18 at 2250  Med List Status: Partial   Medication Last Dose Status   ibuprofen (MOTRIN) 600 MG Tab  Active                 ALLERGIES  Allergies   Allergen Reactions   • Nkda [No Known Drug Allergy]         PHYSICAL EXAM  VITAL SIGNS: /57   Pulse 85   Temp 37.1 °C (98.7 °F)   Resp 20   Ht 1.721 m (5' 7.75\")   Wt 91 kg (200 lb 9.9 oz)   LMP 10/24/2018 (Exact Date)   SpO2 95%   BMI 30.73 kg/m²  @ERMA[[id number]::@     Pulse ox interpretation: 95% I interpret this pulse ox as normal     Constitutional: Well developed, well nourished, alert in no apparent distress, nontoxic appearance   HENT: No external signs of trauma, normocephalic   Eyes: No discharge, no icterus   Neck: Trachea midline, no stridor   Cardiovascular: Strong distal pulses and good perfusion   Thorax & Lungs: No respiratory distress   Extremities: No cyanosis, no edema, no gross deformity, good ROM, no tenderness, intact distal pulses with brisk cap refill "   Skin: Warm, dry, no pallor/cyanosis, approximately 4 cm firm circular erythematous papule on upper right buttock with central scab and tenderness to palpation, no streaking/drainage/crepitus/fluctuance/petechiae/purpura/blisters/vesicles/ulceration  Neuro: A/O times 3, no focal deficits noted   Psychiatric: Cooperative, normal mood and affect, normal judgement        DIAGNOSTIC STUDIES / PROCEDURES    Verbal consent was obtained for incision and drainage.  The area of the incision site was sterilely prepped/draped and locally infiltrated with 3 mL of 1% lidocaine with epi.  An incision was then made with a #11 blade over the apex of the lesion.  Moderate amount of purulent material was expressed.  Loculations were broken up using a hemostat and the cavity was packed with sterile gauze.  The patient tolerated the procedure well without complications.        LABS  All labs reviewed by me.     Results for orders placed or performed during the hospital encounter of 10/06/17   POC URINE PREGNANCY   Result Value Ref Range    POC Urine Pregnancy Test Negative Negative        RADIOLOGY  The radiologist's interpretation of all radiological studies have been reviewed by me.     No orders to display          COURSE & MEDICAL DECISION MAKING  Nursing notes, VS, PMSFHx reviewed in chart.     Review of past medical records shows the patient last came to this ED October 6, 2017 but eloped prior to physician evaluation.      Differential diagnoses considered include but are not limited to: abscess, cellulitis, insect bite/sting       Patient presents with  to the ED with above complaint.  She was found to have an abscess on her right buttock and I&D was performed without any complications.  Low clinical suspicion for more serious acute pathology such as sepsis, necrotizing fasciitis given history/exam/findings.  Patient will be prescribed Bactrim and Keflex.  She was advised on recheck and packing removal in 2-3 days.   Discussed with patient good hygiene and warm compresses.  Return to ED precautions were given.  Patient verbalized understanding and agreed with plan of care with no further questions concerns.      The patient is referred to a primary physician for blood pressure management, diabetic screening, and for all other preventative health concerns.       FINAL IMPRESSION  1. Abscess of buttock, right Acute          DISPOSITION  Patient will be discharged home in stable condition.       FOLLOW UP  Farshad Hatch M.D.  [address] [facility] [address]  [phone]    Call in 1 day      [facility], Emergency Dept  [address] [facility] [address] [phone]    If symptoms worsen         OUTPATIENT MEDICATIONS  Discharge Medication List as of 11/1/2018 11:58 PM      START taking these medications    Details   sulfamethoxazole-trimethoprim (BACTRIM DS) 800-160 MG tablet Take 1 Tab by mouth 2 times a day for 10 days., Disp-20 Tab, R-0, Print Rx Paper      cephALEXin (KEFLEX) 500 MG Cap Take 1 Cap by mouth 4 times a day for 10 days., Disp-40 Cap, R-0, Print Rx Paper                Electronically signed by: Derian Sánchez, 11/1/2018 11:06 PM      Portions of this record were made with voice recognition software.  Despite my review, spelling/grammar/context errors may still remain.  Interpretation of this chart should be taken in this context.

## 2018-12-26 ENCOUNTER — HOSPITAL ENCOUNTER (EMERGENCY)
Facility: MEDICAL CENTER | Age: 33
End: 2018-12-26
Attending: EMERGENCY MEDICINE
Payer: MEDICAID

## 2018-12-26 ENCOUNTER — APPOINTMENT (OUTPATIENT)
Dept: RADIOLOGY | Facility: MEDICAL CENTER | Age: 33
End: 2018-12-26
Attending: EMERGENCY MEDICINE
Payer: MEDICAID

## 2018-12-26 VITALS
BODY MASS INDEX: 30.1 KG/M2 | TEMPERATURE: 98.3 F | OXYGEN SATURATION: 98 % | DIASTOLIC BLOOD PRESSURE: 74 MMHG | RESPIRATION RATE: 16 BRPM | SYSTOLIC BLOOD PRESSURE: 124 MMHG | HEART RATE: 68 BPM | HEIGHT: 68 IN | WEIGHT: 198.63 LBS

## 2018-12-26 DIAGNOSIS — M72.2 PLANTAR FASCIITIS OF LEFT FOOT: ICD-10-CM

## 2018-12-26 PROCEDURE — 99284 EMERGENCY DEPT VISIT MOD MDM: CPT

## 2018-12-26 PROCEDURE — 700101 HCHG RX REV CODE 250: Performed by: EMERGENCY MEDICINE

## 2018-12-26 PROCEDURE — 700111 HCHG RX REV CODE 636 W/ 250 OVERRIDE (IP): Performed by: EMERGENCY MEDICINE

## 2018-12-26 PROCEDURE — 73620 X-RAY EXAM OF FOOT: CPT | Mod: LT

## 2018-12-26 RX ORDER — LIDOCAINE HYDROCHLORIDE 20 MG/ML
20 INJECTION, SOLUTION INFILTRATION; PERINEURAL ONCE
Status: COMPLETED | OUTPATIENT
Start: 2018-12-26 | End: 2018-12-26

## 2018-12-26 RX ORDER — DEXAMETHASONE SODIUM PHOSPHATE 4 MG/ML
8 INJECTION, SOLUTION INTRA-ARTICULAR; INTRALESIONAL; INTRAMUSCULAR; INTRAVENOUS; SOFT TISSUE ONCE
Status: COMPLETED | OUTPATIENT
Start: 2018-12-26 | End: 2018-12-26

## 2018-12-26 RX ADMIN — DEXAMETHASONE SODIUM PHOSPHATE 8 MG: 4 INJECTION, SOLUTION INTRAMUSCULAR; INTRAVENOUS at 13:33

## 2018-12-26 RX ADMIN — LIDOCAINE HYDROCHLORIDE 20 ML: 20 INJECTION, SOLUTION INFILTRATION; PERINEURAL at 13:30

## 2018-12-26 NOTE — ED TRIAGE NOTES
Ambulatory to triage with report of  Chief Complaint   Patient presents with   • Foot Pain     recent Dx of plantar fascitis.  reports pain in arch of left foot shooting into toes.  denies fever, denies recent trauma or injury.  reports cortozone shot to R foot with good relief

## 2018-12-26 NOTE — ED NOTES
Pt reports previous plantar fasciitis to right heel, feels similar in left now. Has not taken anything for pain today.

## 2018-12-26 NOTE — ED NOTES
Pt resting comfortably at this time. Respirations even and unlabored. NAD noted. Denies any additional needs.   Pt aware of POC per ERP

## 2018-12-26 NOTE — ED PROVIDER NOTES
"ED Provider Note    Scribed for Yury Napoles M.D. by Raheem Varma. 12/26/2018  12:16 PM    Primary care provider: Farshad Hatch M.D.  Means of arrival: Walk In  History obtained from: Patient  History limited by: None    CHIEF COMPLAINT  Chief Complaint   Patient presents with   • Foot Pain     recent Dx of plantar fascitis.  reports pain in arch of left foot shooting into toes.  denies fever, denies recent trauma or injury.  reports cortozone shot to R foot with good relief       HPI  Karin Manzo is a 33 y.o. female who presents to the Emergency Department for evaluation of constant left foot pain onset about 1 week ago. The patient notes that the pain is located near the heel of her foot which she describes as a \"shooting and searing pain\" that radiates to the middle of her foot and out to her toes. Per patient, she was diagnosed with plantar fasciitis 2 months ago in her right foot and she notes that the pain in her left foot currently feels very similar. At that time, she was given a cortisone shot in her right foot which alleviated her pain. She has not experienced any recent trauma to her foot. She denies experiencing numbness or tingling to her foot.      REVIEW OF SYSTEMS  Pertinent positives include left foot pain. Pertinent negatives include no numbness, tingling, or recent trauma.    See HPI for further details.     PAST MEDICAL HISTORY   has a past medical history of Abnormal Pap smear of vagina and vaginal HPV; Alcohol abuse (3/19/2013); Arthritis (8/2014); Cold; H/O hypotension (2008); Headache(784.0); Heart murmur (2002); HPV in female; Kidney disease (2208); Other specified symptom associated with female genital organs; Unspecified urinary incontinence; and Urinary, incontinence, stress female previously scheduled for surgery with Dr Singh but canceled on the day of (3/20/2015).    SURGICAL HISTORY   has a past surgical history that includes mini laparotomy (7/17/2017) and " "salpingectomy (Left, 7/17/2017).    SOCIAL HISTORY  Social History   Substance Use Topics   • Smoking status: Former Smoker     Packs/day: 0.25     Years: 4.00     Types: Cigarettes     Quit date: 5/14/2016   • Smokeless tobacco: Never Used      Comment: Pt. states smoked for 4yrs off and on would stop during pregnancy   • Alcohol use No      Comment: quit 1 yr ago      History   Drug Use No       FAMILY HISTORY  Family History   Problem Relation Age of Onset   • Alcohol/Drug Mother         alcoholic, pt. states mom has kidney failure due to alcohol   • Diabetes Father         pills   • Hypertension Father    • Other Father         anxiety   • Thyroid Maternal Grandmother    • Other Paternal Grandmother         Pt. states grandma has low blood sugars and anxiety   • Diabetes Paternal Grandfather    • Other Paternal Grandfather         emphysema       CURRENT MEDICATIONS  Home Medications     Reviewed by Jennifer Haskins R.N. (Registered Nurse) on 12/26/18 at 1144  Med List Status: Partial   Medication Last Dose Status        Patient Yoandy Taking any Medications                       ALLERGIES  Allergies   Allergen Reactions   • Nkda [No Known Drug Allergy]        PHYSICAL EXAM  VITAL SIGNS: /71   Pulse 70   Temp 36.8 °C (98.3 °F) (Temporal)   Resp 14   Ht 1.727 m (5' 8\")   Wt 90.1 kg (198 lb 10.2 oz)   LMP 11/28/2018 (Approximate)   SpO2 98%   BMI 30.20 kg/m²     Constitutional: Well developed, Well nourished,no acute distress, Non-toxic appearance.      CV: Good pulses  Thorax & Lungs: No respiratory distress.   Abdomen:  Soft, non-tender.  No rebound, no peritoneal signs.   Skin: Warm, Dry, No erythema, No rash.    Musculoskeletal: No major deformities noted. Tenderness underneath the left heel.  Patient has probable plantar fasciitis.  I will go ahead and inject with Decadron and lidocaine which is helped her with the other foot previously  Neurologic: Awake, alert. Moves all extremities " spontaneously.  Psychiatric: Affect normal, Mood normal.     RADIOLOGY  DX-FOOT-2- LEFT   Final Result      Negative limited LEFT foot series.        The radiologist's interpretation of all radiological studies have been reviewed by me.      COURSE & MEDICAL DECISION MAKING  Nursing notes, VS, PMSFHx reviewed in chart.    12:16 PM - Patient seen and examined at bedside. Ordered Dx-Foot-Left to evaluate her symptoms. I informed the patient that I will X ray her foot for further evaluation. She understood and agreed.     1:15 PM Ordered Lidocaine 2% 20 mL and Decadron 8 mg    1:28 PM Patient reevaluated at bedside. I injected her foot with lidocaine and decadron at the heel and mid foot. She reported feeling significant improvement to the foot following the procedure. She was stable for discharge at this time. I advised her to follow up with a podiatrist and to return to the ED if any new or worsening symptoms arise. The patient understood and agreed to the plan of care including discharge.     The patient will return for new or worsening symptoms and is stable at the time of discharge.    The patient is referred to a primary physician for blood pressure management, diabetic screening, and for all other preventative health concerns.    DISPOSITION:  Patient will be discharged home in stable condition.    FOLLOW UP:  Farshad Hatch M.D.  [address] [place] [id number]  [phone]          FINAL IMPRESSION  1. Plantar fasciitis of right foot          Raheem REID (Lindsey), am scribing for, and in the presence of, Yury Napoles M.D..    Electronically signed by: Raheem Varma (Lindsey), 12/26/2018    Yury REID M.D. personally performed the services described in this documentation, as scribed by Raheem Varma in my presence, and it is both accurate and complete. E.     The note accurately reflects work and decisions made by me.  Yury Napoles  12/26/2018  1:48 PM

## 2019-02-27 ENCOUNTER — HOSPITAL ENCOUNTER (EMERGENCY)
Facility: MEDICAL CENTER | Age: 34
End: 2019-02-27
Attending: EMERGENCY MEDICINE
Payer: MEDICAID

## 2019-02-27 VITALS
RESPIRATION RATE: 18 BRPM | TEMPERATURE: 97.3 F | DIASTOLIC BLOOD PRESSURE: 73 MMHG | HEART RATE: 85 BPM | WEIGHT: 187.61 LBS | SYSTOLIC BLOOD PRESSURE: 114 MMHG | OXYGEN SATURATION: 97 % | BODY MASS INDEX: 29.45 KG/M2 | HEIGHT: 67 IN

## 2019-02-27 DIAGNOSIS — R21 RASH: ICD-10-CM

## 2019-02-27 PROCEDURE — 99283 EMERGENCY DEPT VISIT LOW MDM: CPT

## 2019-02-28 NOTE — ED TRIAGE NOTES
"Chief Complaint   Patient presents with   • Rash     left upper leg.      /73   Pulse 85   Temp 36.3 °C (97.3 °F)   Resp 18   Ht 1.702 m (5' 7\")   Wt 85.1 kg (187 lb 9.8 oz)   SpO2 97%   BMI 29.38 kg/m²     Pt states she has a rash on her upper left leg. Pt states that they are itchy and hot to the touch. Rash appeared today.     -fever/chills.     Pt placed back out to lobby.   "

## 2019-02-28 NOTE — ED PROVIDER NOTES
"ED Provider Note    CHIEF COMPLAINT  Chief Complaint   Patient presents with   • Rash     left upper leg.        HPI  Karin Manzo is a 34 y.o. female who presents for evaluation of rash to the left thigh present only today, no fever, no weakness or numbness, she states it is itchy and feels warm to the touch.  No history of this in the past.    REVIEW OF SYSTEMS  Negative for fever, weakness, numbness.    PAST MEDICAL HISTORY   has a past medical history of Abnormal Pap smear of vagina and vaginal HPV; Alcohol abuse (3/19/2013); Arthritis (8/2014); Cold; H/O hypotension (2008); Headache(784.0); Heart murmur (2002); HPV in female; Kidney disease (2208); Other specified symptom associated with female genital organs; Unspecified urinary incontinence; and Urinary, incontinence, stress female previously scheduled for surgery with Dr Singh but canceled on the day of (3/20/2015).    SOCIAL HISTORY  Social History     Social History Main Topics   • Smoking status: Former Smoker     Packs/day: 0.25     Years: 4.00     Types: Cigarettes     Quit date: 5/14/2016   • Smokeless tobacco: Never Used      Comment: Pt. states smoked for 4yrs off and on would stop during pregnancy   • Alcohol use Yes      Comment: occ   • Drug use: No   • Sexual activity: Yes     Partners: Male      Comment: NUVA RING        SURGICAL HISTORY   has a past surgical history that includes mini laparotomy (7/17/2017) and salpingectomy (Left, 7/17/2017).    CURRENT MEDICATIONS  I personally reviewed the medication list in the charting documentation.     ALLERGIES  Allergies   Allergen Reactions   • Nkda [No Known Drug Allergy]        PHYSICAL EXAM  VITAL SIGNS: /73   Pulse 85   Temp 36.3 °C (97.3 °F)   Resp 18   Ht 1.702 m (5' 7\")   Wt 85.1 kg (187 lb 9.8 oz)   SpO2 97%   BMI 29.38 kg/m²   Constitutional: Alert in no apparent distress.  HENT: No signs of trauma.   Eyes: Conjunctiva normal, Non-icteric.   Chest: Normal nonlabored " respirations  Skin: No erythema, No rash.   Musculoskeletal: Inspection of the left thigh reveals some pustules with minimal surrounding erythema, multiple of these lesions, no indurated skin, no fluctuance.  Neurologic: Alert, No focal deficits noted.   Psychiatric: Affect normal, Judgment normal.    COURSE & MEDICAL DECISION MAKING  Pertinent Labs & Imaging studies reviewed. (See chart for details)    Encounter Summary: This is a 34 y.o. female with a mild rash to the left thigh with some pustules, suspect some sort of infectious etiology but this is not a significant cellulitis nor abscess, will treat with Bactroban ointment, stable and appropriate for discharge with outpatient follow-up and strict return instructions discussed      DISPOSITION: Discharge Home      FINAL IMPRESSION  1. Rash        This dictation was created using voice recognition software. The accuracy of the dictation is limited to the abilities of the software. I expect there may be some errors of grammar and possibly content. The nursing notes were reviewed and certain aspects of this information were incorporated into this note.    Electronically signed by: David David, 2/27/2019 9:48 PM

## 2019-03-06 ENCOUNTER — HOSPITAL ENCOUNTER (INPATIENT)
Facility: MEDICAL CENTER | Age: 34
LOS: 3 days | DRG: 603 | End: 2019-03-10
Attending: EMERGENCY MEDICINE | Admitting: INTERNAL MEDICINE
Payer: MEDICAID

## 2019-03-06 DIAGNOSIS — L02.31 ABSCESS AND CELLULITIS OF GLUTEAL REGION: ICD-10-CM

## 2019-03-06 DIAGNOSIS — L02.414 ABSCESS OF LEFT FOREARM: ICD-10-CM

## 2019-03-06 DIAGNOSIS — E87.6 HYPOKALEMIA: ICD-10-CM

## 2019-03-06 DIAGNOSIS — O08.0: ICD-10-CM

## 2019-03-06 DIAGNOSIS — N39.3 URINARY, INCONTINENCE, STRESS FEMALE: ICD-10-CM

## 2019-03-06 DIAGNOSIS — B95.62 MRSA CELLULITIS: ICD-10-CM

## 2019-03-06 DIAGNOSIS — D72.829 LEUKOCYTOSIS, UNSPECIFIED TYPE: ICD-10-CM

## 2019-03-06 DIAGNOSIS — L03.317 ABSCESS AND CELLULITIS OF GLUTEAL REGION: ICD-10-CM

## 2019-03-06 DIAGNOSIS — L03.90 MRSA CELLULITIS: ICD-10-CM

## 2019-03-06 DIAGNOSIS — L02.91 ABSCESS: ICD-10-CM

## 2019-03-06 DIAGNOSIS — A41.9 SEPSIS, DUE TO UNSPECIFIED ORGANISM: ICD-10-CM

## 2019-03-06 DIAGNOSIS — L03.90 CELLULITIS, UNSPECIFIED CELLULITIS SITE: ICD-10-CM

## 2019-03-06 DIAGNOSIS — F10.10 ALCOHOL ABUSE: ICD-10-CM

## 2019-03-06 LAB
ALBUMIN SERPL BCP-MCNC: 3.8 G/DL (ref 3.2–4.9)
ALBUMIN/GLOB SERPL: 1.1 G/DL
ALP SERPL-CCNC: 63 U/L (ref 30–99)
ALT SERPL-CCNC: 82 U/L (ref 2–50)
ANION GAP SERPL CALC-SCNC: 9 MMOL/L (ref 0–11.9)
AST SERPL-CCNC: 40 U/L (ref 12–45)
BASOPHILS # BLD AUTO: 0.1 % (ref 0–1.8)
BASOPHILS # BLD: 0.02 K/UL (ref 0–0.12)
BILIRUB SERPL-MCNC: 0.4 MG/DL (ref 0.1–1.5)
BUN SERPL-MCNC: 5 MG/DL (ref 8–22)
CALCIUM SERPL-MCNC: 8.4 MG/DL (ref 8.5–10.5)
CHLORIDE SERPL-SCNC: 108 MMOL/L (ref 96–112)
CO2 SERPL-SCNC: 24 MMOL/L (ref 20–33)
CREAT SERPL-MCNC: 0.58 MG/DL (ref 0.5–1.4)
EOSINOPHIL # BLD AUTO: 0.16 K/UL (ref 0–0.51)
EOSINOPHIL NFR BLD: 1.1 % (ref 0–6.9)
ERYTHROCYTE [DISTWIDTH] IN BLOOD BY AUTOMATED COUNT: 41.5 FL (ref 35.9–50)
GLOBULIN SER CALC-MCNC: 3.5 G/DL (ref 1.9–3.5)
GLUCOSE SERPL-MCNC: 68 MG/DL (ref 65–99)
HCG SERPL QL: NEGATIVE
HCT VFR BLD AUTO: 37.8 % (ref 37–47)
HGB BLD-MCNC: 11.9 G/DL (ref 12–16)
IMM GRANULOCYTES # BLD AUTO: 0.06 K/UL (ref 0–0.11)
IMM GRANULOCYTES NFR BLD AUTO: 0.4 % (ref 0–0.9)
LACTATE BLD-SCNC: 1.7 MMOL/L (ref 0.5–2)
LYMPHOCYTES # BLD AUTO: 1.95 K/UL (ref 1–4.8)
LYMPHOCYTES NFR BLD: 12.8 % (ref 22–41)
MAGNESIUM SERPL-MCNC: 2.1 MG/DL (ref 1.5–2.5)
MCH RBC QN AUTO: 27.5 PG (ref 27–33)
MCHC RBC AUTO-ENTMCNC: 31.5 G/DL (ref 33.6–35)
MCV RBC AUTO: 87.5 FL (ref 81.4–97.8)
MONOCYTES # BLD AUTO: 1.26 K/UL (ref 0–0.85)
MONOCYTES NFR BLD AUTO: 8.3 % (ref 0–13.4)
NEUTROPHILS # BLD AUTO: 11.74 K/UL (ref 2–7.15)
NEUTROPHILS NFR BLD: 77.3 % (ref 44–72)
NRBC # BLD AUTO: 0 K/UL
NRBC BLD-RTO: 0 /100 WBC
PLATELET # BLD AUTO: 333 K/UL (ref 164–446)
PMV BLD AUTO: 9.9 FL (ref 9–12.9)
POTASSIUM SERPL-SCNC: 3.2 MMOL/L (ref 3.6–5.5)
PROT SERPL-MCNC: 7.3 G/DL (ref 6–8.2)
RBC # BLD AUTO: 4.32 M/UL (ref 4.2–5.4)
SODIUM SERPL-SCNC: 141 MMOL/L (ref 135–145)
WBC # BLD AUTO: 15.2 K/UL (ref 4.8–10.8)

## 2019-03-06 PROCEDURE — 96365 THER/PROPH/DIAG IV INF INIT: CPT

## 2019-03-06 PROCEDURE — 99285 EMERGENCY DEPT VISIT HI MDM: CPT

## 2019-03-06 PROCEDURE — 83036 HEMOGLOBIN GLYCOSYLATED A1C: CPT

## 2019-03-06 PROCEDURE — 83735 ASSAY OF MAGNESIUM: CPT

## 2019-03-06 PROCEDURE — 85025 COMPLETE CBC W/AUTO DIFF WBC: CPT

## 2019-03-06 PROCEDURE — 99220 PR INITIAL OBSERVATION CARE,LEVL III: CPT | Performed by: INTERNAL MEDICINE

## 2019-03-06 PROCEDURE — 700111 HCHG RX REV CODE 636 W/ 250 OVERRIDE (IP): Performed by: EMERGENCY MEDICINE

## 2019-03-06 PROCEDURE — 80053 COMPREHEN METABOLIC PANEL: CPT

## 2019-03-06 PROCEDURE — 87040 BLOOD CULTURE FOR BACTERIA: CPT | Mod: 91

## 2019-03-06 PROCEDURE — 96376 TX/PRO/DX INJ SAME DRUG ADON: CPT

## 2019-03-06 PROCEDURE — 96375 TX/PRO/DX INJ NEW DRUG ADDON: CPT

## 2019-03-06 PROCEDURE — 96367 TX/PROPH/DG ADDL SEQ IV INF: CPT

## 2019-03-06 PROCEDURE — 81003 URINALYSIS AUTO W/O SCOPE: CPT

## 2019-03-06 PROCEDURE — G0378 HOSPITAL OBSERVATION PER HR: HCPCS

## 2019-03-06 PROCEDURE — 96366 THER/PROPH/DIAG IV INF ADDON: CPT

## 2019-03-06 PROCEDURE — 700105 HCHG RX REV CODE 258: Performed by: EMERGENCY MEDICINE

## 2019-03-06 PROCEDURE — 83605 ASSAY OF LACTIC ACID: CPT

## 2019-03-06 PROCEDURE — 84703 CHORIONIC GONADOTROPIN ASSAY: CPT

## 2019-03-06 RX ORDER — SODIUM CHLORIDE 9 MG/ML
1000 INJECTION, SOLUTION INTRAVENOUS
Status: COMPLETED | OUTPATIENT
Start: 2019-03-06 | End: 2019-03-07

## 2019-03-06 RX ORDER — BISACODYL 10 MG
10 SUPPOSITORY, RECTAL RECTAL
Status: DISCONTINUED | OUTPATIENT
Start: 2019-03-06 | End: 2019-03-10 | Stop reason: HOSPADM

## 2019-03-06 RX ORDER — AMOXICILLIN 250 MG
2 CAPSULE ORAL 2 TIMES DAILY
Status: DISCONTINUED | OUTPATIENT
Start: 2019-03-06 | End: 2019-03-10 | Stop reason: HOSPADM

## 2019-03-06 RX ORDER — MORPHINE SULFATE 4 MG/ML
4 INJECTION, SOLUTION INTRAMUSCULAR; INTRAVENOUS ONCE
Status: COMPLETED | OUTPATIENT
Start: 2019-03-06 | End: 2019-03-06

## 2019-03-06 RX ORDER — ONDANSETRON 2 MG/ML
4 INJECTION INTRAMUSCULAR; INTRAVENOUS EVERY 4 HOURS PRN
Status: DISCONTINUED | OUTPATIENT
Start: 2019-03-06 | End: 2019-03-10 | Stop reason: HOSPADM

## 2019-03-06 RX ORDER — POTASSIUM CHLORIDE 20 MEQ/1
40 TABLET, EXTENDED RELEASE ORAL ONCE
Status: COMPLETED | OUTPATIENT
Start: 2019-03-06 | End: 2019-03-07

## 2019-03-06 RX ORDER — SODIUM CHLORIDE 9 MG/ML
INJECTION, SOLUTION INTRAVENOUS CONTINUOUS
Status: DISCONTINUED | OUTPATIENT
Start: 2019-03-06 | End: 2019-03-08

## 2019-03-06 RX ORDER — ACETAMINOPHEN 325 MG/1
650 TABLET ORAL EVERY 6 HOURS PRN
Status: DISCONTINUED | OUTPATIENT
Start: 2019-03-06 | End: 2019-03-10 | Stop reason: HOSPADM

## 2019-03-06 RX ORDER — POLYETHYLENE GLYCOL 3350 17 G/17G
1 POWDER, FOR SOLUTION ORAL
Status: DISCONTINUED | OUTPATIENT
Start: 2019-03-06 | End: 2019-03-10 | Stop reason: HOSPADM

## 2019-03-06 RX ORDER — KETOROLAC TROMETHAMINE 30 MG/ML
15 INJECTION, SOLUTION INTRAMUSCULAR; INTRAVENOUS ONCE
Status: COMPLETED | OUTPATIENT
Start: 2019-03-06 | End: 2019-03-06

## 2019-03-06 RX ORDER — ONDANSETRON 4 MG/1
4 TABLET, ORALLY DISINTEGRATING ORAL EVERY 4 HOURS PRN
Status: DISCONTINUED | OUTPATIENT
Start: 2019-03-06 | End: 2019-03-10 | Stop reason: HOSPADM

## 2019-03-06 RX ORDER — DEXTROSE MONOHYDRATE 25 G/50ML
25 INJECTION, SOLUTION INTRAVENOUS
Status: DISCONTINUED | OUTPATIENT
Start: 2019-03-06 | End: 2019-03-09

## 2019-03-06 RX ADMIN — CEFTRIAXONE SODIUM 2 G: 2 INJECTION, POWDER, FOR SOLUTION INTRAMUSCULAR; INTRAVENOUS at 20:09

## 2019-03-06 RX ADMIN — VANCOMYCIN HYDROCHLORIDE 2200 MG: 100 INJECTION, POWDER, LYOPHILIZED, FOR SOLUTION INTRAVENOUS at 21:14

## 2019-03-06 RX ADMIN — MORPHINE SULFATE 4 MG: 4 INJECTION INTRAVENOUS at 19:08

## 2019-03-06 RX ADMIN — KETOROLAC TROMETHAMINE 15 MG: 30 INJECTION, SOLUTION INTRAMUSCULAR; INTRAVENOUS at 19:09

## 2019-03-06 RX ADMIN — MORPHINE SULFATE 4 MG: 4 INJECTION INTRAVENOUS at 21:41

## 2019-03-06 ASSESSMENT — ENCOUNTER SYMPTOMS
SHORTNESS OF BREATH: 0
HEADACHES: 0
FEVER: 0
BACK PAIN: 0
ABDOMINAL PAIN: 0
VOMITING: 0
SORE THROAT: 0
NAUSEA: 0
COUGH: 0

## 2019-03-06 ASSESSMENT — COPD QUESTIONNAIRES
DURING THE PAST 4 WEEKS HOW MUCH DID YOU FEEL SHORT OF BREATH: NONE/LITTLE OF THE TIME
DO YOU EVER COUGH UP ANY MUCUS OR PHLEGM?: NO/ONLY WITH OCCASIONAL COLDS OR INFECTIONS
HAVE YOU SMOKED AT LEAST 100 CIGARETTES IN YOUR ENTIRE LIFE: YES
COPD SCREENING SCORE: 2

## 2019-03-06 ASSESSMENT — LIFESTYLE VARIABLES: EVER_SMOKED: YES

## 2019-03-07 ENCOUNTER — APPOINTMENT (OUTPATIENT)
Dept: RADIOLOGY | Facility: MEDICAL CENTER | Age: 34
DRG: 603 | End: 2019-03-07
Attending: HOSPITALIST
Payer: MEDICAID

## 2019-03-07 PROBLEM — L02.414 ABSCESS OF LEFT FOREARM: Status: ACTIVE | Noted: 2019-03-07

## 2019-03-07 PROBLEM — E87.6 HYPOKALEMIA: Status: ACTIVE | Noted: 2019-03-07

## 2019-03-07 PROBLEM — L02.31 ABSCESS AND CELLULITIS OF GLUTEAL REGION: Status: ACTIVE | Noted: 2019-03-07

## 2019-03-07 PROBLEM — L03.317 ABSCESS AND CELLULITIS OF GLUTEAL REGION: Status: ACTIVE | Noted: 2019-03-07

## 2019-03-07 PROBLEM — D72.829 LEUKOCYTOSIS: Status: ACTIVE | Noted: 2019-03-07

## 2019-03-07 LAB
AMPHET UR QL SCN: NEGATIVE
ANION GAP SERPL CALC-SCNC: 7 MMOL/L (ref 0–11.9)
APPEARANCE UR: CLEAR
BARBITURATES UR QL SCN: NEGATIVE
BASOPHILS # BLD AUTO: 0.3 % (ref 0–1.8)
BASOPHILS # BLD: 0.03 K/UL (ref 0–0.12)
BENZODIAZ UR QL SCN: NEGATIVE
BILIRUB UR QL STRIP.AUTO: NEGATIVE
BUN SERPL-MCNC: 4 MG/DL (ref 8–22)
BZE UR QL SCN: NEGATIVE
CALCIUM SERPL-MCNC: 7.3 MG/DL (ref 8.5–10.5)
CANNABINOIDS UR QL SCN: POSITIVE
CHLORIDE SERPL-SCNC: 110 MMOL/L (ref 96–112)
CO2 SERPL-SCNC: 21 MMOL/L (ref 20–33)
COLOR UR: YELLOW
CREAT SERPL-MCNC: 0.45 MG/DL (ref 0.5–1.4)
EOSINOPHIL # BLD AUTO: 0.15 K/UL (ref 0–0.51)
EOSINOPHIL NFR BLD: 1.3 % (ref 0–6.9)
ERYTHROCYTE [DISTWIDTH] IN BLOOD BY AUTOMATED COUNT: 42.6 FL (ref 35.9–50)
EST. AVERAGE GLUCOSE BLD GHB EST-MCNC: 117 MG/DL
GLUCOSE BLD-MCNC: 104 MG/DL (ref 65–99)
GLUCOSE BLD-MCNC: 107 MG/DL (ref 65–99)
GLUCOSE SERPL-MCNC: 105 MG/DL (ref 65–99)
GLUCOSE UR STRIP.AUTO-MCNC: NEGATIVE MG/DL
HBA1C MFR BLD: 5.7 % (ref 0–5.6)
HCT VFR BLD AUTO: 33.6 % (ref 37–47)
HGB BLD-MCNC: 10.4 G/DL (ref 12–16)
IMM GRANULOCYTES # BLD AUTO: 0.07 K/UL (ref 0–0.11)
IMM GRANULOCYTES NFR BLD AUTO: 0.6 % (ref 0–0.9)
KETONES UR STRIP.AUTO-MCNC: NEGATIVE MG/DL
LEUKOCYTE ESTERASE UR QL STRIP.AUTO: NEGATIVE
LYMPHOCYTES # BLD AUTO: 1.97 K/UL (ref 1–4.8)
LYMPHOCYTES NFR BLD: 16.8 % (ref 22–41)
MCH RBC QN AUTO: 27.1 PG (ref 27–33)
MCHC RBC AUTO-ENTMCNC: 31 G/DL (ref 33.6–35)
MCV RBC AUTO: 87.5 FL (ref 81.4–97.8)
METHADONE UR QL SCN: NEGATIVE
MICRO URNS: NORMAL
MONOCYTES # BLD AUTO: 0.85 K/UL (ref 0–0.85)
MONOCYTES NFR BLD AUTO: 7.2 % (ref 0–13.4)
NEUTROPHILS # BLD AUTO: 8.67 K/UL (ref 2–7.15)
NEUTROPHILS NFR BLD: 73.8 % (ref 44–72)
NITRITE UR QL STRIP.AUTO: NEGATIVE
NRBC # BLD AUTO: 0 K/UL
NRBC BLD-RTO: 0 /100 WBC
OPIATES UR QL SCN: NEGATIVE
OXYCODONE UR QL SCN: POSITIVE
PCP UR QL SCN: NEGATIVE
PH UR STRIP.AUTO: 7 [PH]
PLATELET # BLD AUTO: 294 K/UL (ref 164–446)
PMV BLD AUTO: 9.6 FL (ref 9–12.9)
POTASSIUM SERPL-SCNC: 3.8 MMOL/L (ref 3.6–5.5)
PROPOXYPH UR QL SCN: NEGATIVE
PROT UR QL STRIP: NEGATIVE MG/DL
RBC # BLD AUTO: 3.84 M/UL (ref 4.2–5.4)
RBC UR QL AUTO: NEGATIVE
SODIUM SERPL-SCNC: 138 MMOL/L (ref 135–145)
SP GR UR STRIP.AUTO: 1
UROBILINOGEN UR STRIP.AUTO-MCNC: 0.2 MG/DL
WBC # BLD AUTO: 11.7 K/UL (ref 4.8–10.8)

## 2019-03-07 PROCEDURE — A9270 NON-COVERED ITEM OR SERVICE: HCPCS | Performed by: HOSPITALIST

## 2019-03-07 PROCEDURE — 700111 HCHG RX REV CODE 636 W/ 250 OVERRIDE (IP): Performed by: INTERNAL MEDICINE

## 2019-03-07 PROCEDURE — 80307 DRUG TEST PRSMV CHEM ANLYZR: CPT

## 2019-03-07 PROCEDURE — 700117 HCHG RX CONTRAST REV CODE 255: Performed by: HOSPITALIST

## 2019-03-07 PROCEDURE — 82962 GLUCOSE BLOOD TEST: CPT

## 2019-03-07 PROCEDURE — 99233 SBSQ HOSP IP/OBS HIGH 50: CPT | Performed by: HOSPITALIST

## 2019-03-07 PROCEDURE — 700102 HCHG RX REV CODE 250 W/ 637 OVERRIDE(OP): Performed by: INTERNAL MEDICINE

## 2019-03-07 PROCEDURE — 85025 COMPLETE CBC W/AUTO DIFF WBC: CPT

## 2019-03-07 PROCEDURE — 700105 HCHG RX REV CODE 258: Performed by: INTERNAL MEDICINE

## 2019-03-07 PROCEDURE — 770006 HCHG ROOM/CARE - MED/SURG/GYN SEMI*

## 2019-03-07 PROCEDURE — 80048 BASIC METABOLIC PNL TOTAL CA: CPT

## 2019-03-07 PROCEDURE — 700102 HCHG RX REV CODE 250 W/ 637 OVERRIDE(OP): Performed by: HOSPITALIST

## 2019-03-07 PROCEDURE — 72193 CT PELVIS W/DYE: CPT

## 2019-03-07 PROCEDURE — 36415 COLL VENOUS BLD VENIPUNCTURE: CPT

## 2019-03-07 PROCEDURE — A9270 NON-COVERED ITEM OR SERVICE: HCPCS | Performed by: INTERNAL MEDICINE

## 2019-03-07 RX ORDER — MORPHINE SULFATE 4 MG/ML
2-5 INJECTION, SOLUTION INTRAMUSCULAR; INTRAVENOUS EVERY 4 HOURS PRN
Status: DISCONTINUED | OUTPATIENT
Start: 2019-03-07 | End: 2019-03-09

## 2019-03-07 RX ORDER — OXYCODONE HYDROCHLORIDE 10 MG/1
10 TABLET ORAL EVERY 4 HOURS PRN
Status: DISCONTINUED | OUTPATIENT
Start: 2019-03-07 | End: 2019-03-10

## 2019-03-07 RX ORDER — OXYCODONE HYDROCHLORIDE 5 MG/1
5 TABLET ORAL EVERY 4 HOURS PRN
Status: DISCONTINUED | OUTPATIENT
Start: 2019-03-07 | End: 2019-03-07

## 2019-03-07 RX ORDER — OXYCODONE HYDROCHLORIDE 5 MG/1
5 TABLET ORAL EVERY 4 HOURS PRN
Status: DISCONTINUED | OUTPATIENT
Start: 2019-03-07 | End: 2019-03-10 | Stop reason: HOSPADM

## 2019-03-07 RX ADMIN — VANCOMYCIN HYDROCHLORIDE 1000 MG: 100 INJECTION, POWDER, LYOPHILIZED, FOR SOLUTION INTRAVENOUS at 20:52

## 2019-03-07 RX ADMIN — SODIUM CHLORIDE: 9 INJECTION, SOLUTION INTRAVENOUS at 14:07

## 2019-03-07 RX ADMIN — OXYCODONE HYDROCHLORIDE 5 MG: 5 TABLET ORAL at 14:07

## 2019-03-07 RX ADMIN — IOHEXOL 100 ML: 350 INJECTION, SOLUTION INTRAVENOUS at 20:32

## 2019-03-07 RX ADMIN — POTASSIUM CHLORIDE 40 MEQ: 1500 TABLET, EXTENDED RELEASE ORAL at 00:45

## 2019-03-07 RX ADMIN — SODIUM CHLORIDE: 9 INJECTION, SOLUTION INTRAVENOUS at 02:40

## 2019-03-07 RX ADMIN — ACETAMINOPHEN 650 MG: 325 TABLET, FILM COATED ORAL at 20:38

## 2019-03-07 RX ADMIN — VANCOMYCIN HYDROCHLORIDE 1000 MG: 100 INJECTION, POWDER, LYOPHILIZED, FOR SOLUTION INTRAVENOUS at 14:07

## 2019-03-07 RX ADMIN — SODIUM CHLORIDE 1000 ML: 9 INJECTION, SOLUTION INTRAVENOUS at 00:47

## 2019-03-07 RX ADMIN — OXYCODONE HYDROCHLORIDE 5 MG: 5 TABLET ORAL at 20:12

## 2019-03-07 RX ADMIN — VANCOMYCIN HYDROCHLORIDE 1000 MG: 100 INJECTION, POWDER, LYOPHILIZED, FOR SOLUTION INTRAVENOUS at 05:55

## 2019-03-07 RX ADMIN — OXYCODONE HYDROCHLORIDE 5 MG: 5 TABLET ORAL at 05:55

## 2019-03-07 RX ADMIN — OXYCODONE HYDROCHLORIDE 5 MG: 5 TABLET ORAL at 00:56

## 2019-03-07 RX ADMIN — SENNOSIDES AND DOCUSATE SODIUM 2 TABLET: 8.6; 5 TABLET ORAL at 00:45

## 2019-03-07 RX ADMIN — ACETAMINOPHEN 650 MG: 325 TABLET, FILM COATED ORAL at 09:50

## 2019-03-07 RX ADMIN — OXYCODONE HYDROCHLORIDE 5 MG: 5 TABLET ORAL at 09:50

## 2019-03-07 RX ADMIN — SENNOSIDES AND DOCUSATE SODIUM 2 TABLET: 8.6; 5 TABLET ORAL at 17:25

## 2019-03-07 RX ADMIN — CEFTRIAXONE SODIUM 2 G: 2 INJECTION, POWDER, FOR SOLUTION INTRAMUSCULAR; INTRAVENOUS at 04:31

## 2019-03-07 RX ADMIN — ACETAMINOPHEN 650 MG: 325 TABLET, FILM COATED ORAL at 14:07

## 2019-03-07 ASSESSMENT — ENCOUNTER SYMPTOMS
BRUISES/BLEEDS EASILY: 0
SEIZURES: 0
NAUSEA: 0
WHEEZING: 0
PALPITATIONS: 0
DIARRHEA: 0
HEMOPTYSIS: 0
ABDOMINAL PAIN: 0
BLOOD IN STOOL: 0
DEPRESSION: 0
FOCAL WEAKNESS: 0
EYE PAIN: 0
FEVER: 0
CLAUDICATION: 0
DIAPHORESIS: 0
COUGH: 0
NECK PAIN: 0
SPEECH CHANGE: 0
BACK PAIN: 0
LOSS OF CONSCIOUSNESS: 0
SHORTNESS OF BREATH: 0
MYALGIAS: 0
FLANK PAIN: 0
VOMITING: 0
DIZZINESS: 0
SORE THROAT: 0
EYE DISCHARGE: 0
CONSTIPATION: 0
BLURRED VISION: 0
SPUTUM PRODUCTION: 0
HEADACHES: 0
SENSORY CHANGE: 0
WEAKNESS: 0
CHILLS: 0

## 2019-03-07 ASSESSMENT — COGNITIVE AND FUNCTIONAL STATUS - GENERAL
SUGGESTED CMS G CODE MODIFIER DAILY ACTIVITY: CI
SUGGESTED CMS G CODE MODIFIER MOBILITY: CI
CLIMB 3 TO 5 STEPS WITH RAILING: A LITTLE
HELP NEEDED FOR BATHING: A LITTLE
DAILY ACTIVITIY SCORE: 23
MOBILITY SCORE: 23

## 2019-03-07 ASSESSMENT — PATIENT HEALTH QUESTIONNAIRE - PHQ9
SUM OF ALL RESPONSES TO PHQ9 QUESTIONS 1 AND 2: 0
2. FEELING DOWN, DEPRESSED, IRRITABLE, OR HOPELESS: NOT AT ALL
2. FEELING DOWN, DEPRESSED, IRRITABLE, OR HOPELESS: NOT AT ALL
SUM OF ALL RESPONSES TO PHQ9 QUESTIONS 1 AND 2: 0
1. LITTLE INTEREST OR PLEASURE IN DOING THINGS: NOT AT ALL
1. LITTLE INTEREST OR PLEASURE IN DOING THINGS: NOT AT ALL

## 2019-03-07 ASSESSMENT — LIFESTYLE VARIABLES: SUBSTANCE_ABUSE: 0

## 2019-03-07 NOTE — CARE PLAN
Problem: Communication  Goal: The ability to communicate needs accurately and effectively will improve    Intervention: Reorient patient to environment as needed  Patient oriented to room and call light use, Bedside table and call button within reach.       Problem: Pain Management  Goal: Pain level will decrease to patient's comfort goal    Intervention: Follow pain managment plan developed in collaboration with patient and Interdisciplinary Team  MD Notified for patient's c/o pain d/t abscess. PRN Oxycodone given per MAR.

## 2019-03-07 NOTE — PROGRESS NOTES
Patient received at approximately 0050 via gurney and got admitted to T304-2. Report received from  ER nurse. Patient re-oriented to room and call light use. Spoke to hospitalist at approximately 0100 due to patient's c/o pain to buttocks with new order of Oxycodone given per MAR.    · 2 RN skin check complete with KENDY Rice.  · Devices in place: n/a  · Skin assessed under devices: n/a  · Confirmed pressure ulcers found on: no pressure injury noted  · Multiple raised scab to buttocks, redness and swelling also noted r/t abscess. Swelling also noted to LFA. LDA added with pictures, FÁTIMA pending wound consult.   · New potential pressure ulcers noted: n/a   · The following interventions in place: patient able to turn self from side to side, pillows in use for repositioning,moisturizer in use.

## 2019-03-07 NOTE — H&P
Hospital Medicine History & Physical Note    Date of Service  3/6/2019    Primary Care Physician  Farshad Hatch M.D.    Consultants  None    Code Status  Full code    Chief Complaint  Multiple abscesses    History of Presenting Illness  34 y.o. female who presented 3/6/2019 with multiple abscesses for the past 1 week.  Patient presented to the ER on 2/27/19 with a rash to the left thigh with some pustules for which she was prescribed Bactroban ointment and discharged home.  Her rash worsened with increased redness, swelling and multiple areas of discharge.  She also developed a abscess on her left forearm with drainage. She started taking amoxicillin with no improvement. She denies any previous history of similar infections.  She denies IV drug abuse.  She is not on any medications.  She denies any fevers or chills.    Review of Systems  Review of Systems   Constitutional: Negative for chills, diaphoresis and fever.   HENT: Negative for hearing loss and sore throat.    Eyes: Negative for blurred vision.   Respiratory: Negative for cough, sputum production, shortness of breath and wheezing.    Cardiovascular: Negative for chest pain, palpitations and leg swelling.   Gastrointestinal: Negative for abdominal pain, blood in stool, diarrhea, nausea and vomiting.   Genitourinary: Negative for dysuria, flank pain and urgency.   Musculoskeletal: Negative for back pain, joint pain, myalgias and neck pain.   Skin: Positive for rash.        Rash and cellulitis of left forearm and left thigh/buttock area   Neurological: Negative for dizziness, focal weakness, seizures and headaches.   Endo/Heme/Allergies: Does not bruise/bleed easily.   Psychiatric/Behavioral: Negative for suicidal ideas.   All other systems reviewed and are negative.      Past Medical History   has a past medical history of Abnormal Pap smear of vagina and vaginal HPV; Alcohol abuse (3/19/2013); Arthritis (8/2014); Cold; H/O hypotension (2008);  Headache(784.0); Heart murmur (2002); HPV in female; Kidney disease (2208); Other specified symptom associated with female genital organs; Unspecified urinary incontinence; and Urinary, incontinence, stress female previously scheduled for surgery with Dr Singh but canceled on the day of (3/20/2015). She also has no past medical history of Addisons disease (HCC); Adrenal disorder (HCC); Allergy; Anemia; Anxiety; Arrhythmia; ASTHMA; Blood transfusion; Cancer (HCC); CATARACT; CHF (congestive heart failure) (HCC); Clotting disorder (HCC); COPD; Cushings syndrome (HCC); Depression; Diabetes; Diabetic neuropathy (HCC); EMPHYSEMA; GERD (gastroesophageal reflux disease); Glaucoma; Goiter; Heart attack (HCC); HIV (human immunodeficiency virus infection); Hyperlipidemia; Hypertension; IBD (inflammatory bowel disease); Meningitis; Migraine; Muscle disorder; OSTEOPOROSIS; Parathyroid disorder (HCC); Pituitary disease (HCC); Seizure (HCC); Sickle cell disease (HCC); Stroke (HCC); Thyroid disease; Tuberculosis; Ulcer; or Urinary tract infection, site not specified.    Surgical History   has a past surgical history that includes mini laparotomy (7/17/2017) and salpingectomy (Left, 7/17/2017).     Family History  family history includes Alcohol/Drug in her mother; Diabetes in her father and paternal grandfather; Hypertension in her father; Other in her father, paternal grandfather, and paternal grandmother; Thyroid in her maternal grandmother.     Social History   reports that she quit smoking about 2 years ago. Her smoking use included Cigarettes. She has a 1.00 pack-year smoking history. She has never used smokeless tobacco. She reports that she drinks alcohol. She reports that she does not use drugs.    Allergies  Allergies   Allergen Reactions   • Nkda [No Known Drug Allergy]        Medications  None       Physical Exam  Temp:  [36.4 °C (97.6 °F)] 36.4 °C (97.6 °F)  Pulse:  [66-92] 73  Resp:  [16] 16  BP: (105)/(61)  105/61  SpO2:  [97 %-98 %] 97 %    Physical Exam   Constitutional: She is oriented to person, place, and time. She appears well-developed and well-nourished. No distress.   HENT:   Head: Normocephalic and atraumatic.   Mouth/Throat: Oropharynx is clear and moist.   Eyes: Pupils are equal, round, and reactive to light. Conjunctivae are normal.   Neck: Neck supple. No thyromegaly present.   Cardiovascular: Normal rate, regular rhythm and normal heart sounds.    Pulmonary/Chest: Effort normal and breath sounds normal. No respiratory distress. She has no wheezes. She has no rales.   Abdominal: Soft. Bowel sounds are normal. She exhibits no distension. There is no tenderness. There is no rebound.   Musculoskeletal: Normal range of motion. She exhibits no edema or tenderness.   Neurological: She is alert and oriented to person, place, and time. No cranial nerve deficit. Coordination normal.   Skin: Skin is warm and dry.   Left forearm 1 cm abscess with purulent drainage and surrounding erythema and swelling    Left buttock with multiple abscesses with purulent drainage and surrounding erythema   Psychiatric: She has a normal mood and affect. Her behavior is normal.   Nursing note and vitals reviewed.      Laboratory:  Recent Labs      03/06/19   1905   WBC  15.2*   RBC  4.32   HEMOGLOBIN  11.9*   HEMATOCRIT  37.8   MCV  87.5   MCH  27.5   MCHC  31.5*   RDW  41.5   PLATELETCT  333   MPV  9.9     Recent Labs      03/06/19   1905   SODIUM  141   POTASSIUM  3.2*   CHLORIDE  108   CO2  24   GLUCOSE  68   BUN  5*   CREATININE  0.58   CALCIUM  8.4*     Recent Labs      03/06/19   1905   ALTSGPT  82*   ASTSGOT  40   ALKPHOSPHAT  63   TBILIRUBIN  0.4   GLUCOSE  68                 No results for input(s): TROPONINI in the last 72 hours.    Urinalysis:    No results found     Imaging:  No orders to display         Assessment/Plan:  I anticipate this patient is appropriate for observation status at this time.    Abscess and  cellulitis of gluteal region- (present on admission)   Assessment & Plan    Patient is been on IV vancomycin and IV ceftriaxone, monitor for vancomycin toxicity and follow trough levels  Follow blood and wound cultures  Wound care  Pain control with Tylenol and oxycodone     Abscess of left forearm- (present on admission)   Assessment & Plan    Management as above     Leukocytosis- (present on admission)   Assessment & Plan    Secondary to above  Patient does not meet sepsis criteria at this time, monitor CBC and vitals     Hypokalemia- (present on admission)   Assessment & Plan    Replete with K Dur 40  Monitor BMP         VTE prophylaxis: SCD

## 2019-03-07 NOTE — PROGRESS NOTES
"Pharmacy Kinetics 34 y.o. female on vancomycin day # 2 3/7/2019    Currently on Vancomycin 1,000 mg IV Q8h (05,13,21)    Indication for Treatment: Cellulitis/abscess of L forearm and gluteal region    Pertinent history per medical record: Admitted on 3/6/2019 for evaluation of bumps on left forearm and left thigh/gluteus region. Patient reported that she has been on Bactroban with no improvement and increased pain and drainage.     Other antibiotics:   Ceftriaxone 2g iv q24hr    Allergies: Nkda [no known drug allergy]     List concerns for renal function: obesity.    Pertinent cultures to date:   3/6/19 - Blood - Neg    MRSA nares swab if pneumonia is a concern (ordered/positive/negative/n-a): Na    Recent Labs      19   1905   WBC  15.2*   NEUTSPOLYS  77.30*     Recent Labs      19   1905   BUN  5*   CREATININE  0.58   ALBUMIN  3.8     Intake/Output Summary (Last 24 hours) at 19 0851  Last data filed at 19 0100   Gross per 24 hour   Intake           1080.1 ml   Output                0 ml   Net           1080.1 ml      Blood pressure (!) 96/55, pulse 84, temperature 36.3 °C (97.4 °F), temperature source Temporal, resp. rate 17, height 1.702 m (5' 7\"), weight 86.9 kg (191 lb 9.3 oz), SpO2 95 %, not currently breastfeeding. Temp (24hrs), Av.6 °C (97.9 °F), Min:36.3 °C (97.4 °F), Max:37 °C (98.6 °F)      A/P   1. Vancomycin dose change: No change  2. Next vancomycin level: 3/8 @04  3. Goal trough: 12-16mcg/mL  4. Comments: Urine output unknown and Scr appears improved from past labs (this is relative though as last labs were in 2017). No I&D appears to have been performed so far. Pharmacy will continue to monitor and assess accordingly.     Karen Denise, PharmD    "

## 2019-03-07 NOTE — PROGRESS NOTES
Pt is expressing disagreement with pain plan. Pt was sleeping soundly, snoring softly, and was not woken up even by staff caring for her roommate and roommate utilizing the bathroom near her. RN let the pt sleep because she was not yet due for pain medication and RN had received in report that pt had a lot of pain. Pt woken up by wound RN for routine wound care. RN assessed pt at this time with the wound RN. Pt was not yet due for pain medication. Pt stated that she was very dissatisfied and that she wanted to speak with the MD about her pain regimen. Oxycodone provided for pain of  as soon as pt could have it. RN offered to walk with patient. Pt declined ambulation. RN did not notice medical team rounding with patients and RN paged Dr. Rivas.       20 min later, did not receive call back. Re-paged hospitalist.

## 2019-03-07 NOTE — PROGRESS NOTES
"Pharmacy Kinetics 34 y.o. female on vancomycin day # 1 3/6/2019    New start  Received Vancomycin 2,200 mg IV loading dose at 21:14 PM    Indication for Treatment: SSTI (left forearm abscess and left buttocks cellulitis)    Pertinent history per medical record: Admitted on 3/6/2019 for cellulitis w/ abscesses on L forearm, and L buttocks cellulitis.    Karin Manzo presented to the ER w/ a chief complaint of draining abscesses to L forearm, as well as cellulitis to L thigh & buttocks. She has a leukocytosis but no lactic acidosis or fever. Of note, the pt initially presented on  w/ findings consistent with skin pustules to the L thigh. She was prescribed Bactroban ointment on discharge.     Other antibiotics: ceftriaxone 2g IV Q24h     Allergies: Nkda [no known drug allergy]     List concerns for renal function: BMI 30, abnormal LFTs (ALT 82)    Pertinent cultures to date:   3/7/19: Blood culture - in process   Wound culture needs to be collected     MRSA nares swab if pneumonia is a concern (ordered/positive/negative/n-a): n/a    Recent Labs      19   1905   WBC  15.2*   NEUTSPOLYS  77.30*     Recent Labs      19   1905   BUN  5*   CREATININE  0.58   ALBUMIN  3.8     Intake/Output Summary (Last 24 hours) at 19 2242  Last data filed at 19   Gross per 24 hour   Intake              100 ml   Output                0 ml   Net              100 ml      Blood pressure 105/61, pulse 73, temperature 36.4 °C (97.6 °F), temperature source Temporal, resp. rate 16, height 1.702 m (5' 7\"), weight 86.9 kg (191 lb 9.3 oz), SpO2 97 %, not currently breastfeeding. Temp (24hrs), Av.4 °C (97.6 °F), Min:36.4 °C (97.6 °F), Max:36.4 °C (97.6 °F)    A/P   1. Vancomycin dose change: 1,000 mg IV Q8h (05:00, 13:00, 21:00)  2. Next vancomycin level: Trough prior to the 4th or 5th total dose (level not yet ordered)   3. Goal trough: 12-16 mcg/mL  4. Comments: The patient was initiated on vancomycin ~12 mg/kg " IV Q8h. Concerns for renal accumulation of vancomycin listed above. A trough will be obtained at steady state. Pharmacy will continue to follow and recommend de-escalation of antibiotics as appropriate.     Tia Meza, PharmD, BCPS

## 2019-03-07 NOTE — ASSESSMENT & PLAN NOTE
IV vancomycin, monitor for vancomycin toxicity and follow trough levels  Follow blood and wound cultures  Wound care  Pain control with Tylenol and oxycodone  3/8:  Improving on IV vancomycin.  Wound culture obtained left forearm abscess.  Appearance of staph species.  Buttock appearance more c/w extensive folliculitis.  CT pelvis negative for deep abscess formation.  3/9:   staph aureus ss pending.

## 2019-03-07 NOTE — WOUND TEAM
"[facility]  Inpatient Services  Initial Wound and Skin Care Evaluation    Admission Date: 3/6/2019     Consult Date: 03/06/2019   HPI, PMH, SH: Reviewed    Unit where seen by Wound Team: T304/02     WOUND CONSULT RELATED TO:  Multiple abscesses over buttocks and to left forearm.     SUBJECTIVE:  \"The pain is so bad! They are only giving me 5 mg of oxy.\"      Self Report / Pain Level:  10/10       OBJECTIVE:  Several abscesses had ruptured, drainage on chux.    WOUND TYPE, LOCATION, CHARACTERISTICS (Pressure Injuries: location, stage, POA or date identified)             Wound 03/07/19 Other (comment) Buttocks left, multiple abscesses (Active)   Wound Image      Site Assessment Drainage;Bleeding;Painful;Red;Swelling;Yellow    Belen-wound Assessment Red;Painful;Swelling    Margins Undefined edges    Wound Length (cm) 2 cm    Wound Width (cm) 2 cm    Wound Depth (cm) 0.5 cm    Wound Surface Area (cm^2) 4 cm^2    Closure None    Drainage Amount Moderate    Drainage Description Serosanguineous;Purulent    Non-staged Wound Description Partial thickness    Treatments Cleansed;Site care    Cleansing Normal Saline Irrigation    Periwound Protectant Not Applicable    Dressing Options Hydrofiber Silver;Mepilex;Adhesive Foam    Dressing Cleansing/Solutions Not Applicable    Dressing Changed New    Dressing Status Clean;Dry;Intact    Dressing Change Frequency Every 48 hrs    NEXT Dressing Change  03/09/19    NEXT Weekly Photo (Inpatient Only) 03/13/19    WOUND NURSE ONLY - Odor None    WOUND NURSE ONLY - Exposed Structures None    WOUND NURSE ONLY - Tissue Type and Percentage 80% red, 20% yellow        Wound 03/07/19 Other (comment) Buttocks right, multiple abscesses (Active)   Wound Image      Site Assessment Red;Swelling;Painful    Belen-wound Assessment Red;Swelling;Painful    Margins Undefined edges;Attached edges    Wound Length (cm) 2 cm    Wound Width (cm) 2 cm    Wound Surface Area (cm^2) 4 cm^2    Drainage " Amount None    Non-staged Wound Description Partial thickness    Treatments Cleansed;Site care    Cleansing Normal Saline Irrigation    Periwound Protectant Not Applicable    Dressing Options Hydrofiber Silver;Mepilex    Dressing Cleansing/Solutions Not Applicable    Dressing Changed New    Dressing Status Clean;Intact;Dry    Dressing Change Frequency Every 48 hrs    NEXT Dressing Change  03/09/19    NEXT Weekly Photo (Inpatient Only) 03/13/19    WOUND NURSE ONLY - Odor None    WOUND NURSE ONLY - Exposed Structures None    WOUND NURSE ONLY - Tissue Type and Percentage 100% red        Wound 03/07/19 Other (comment) Arm Left FA abscess (Active)   Wound Image      Site Assessment Brown;Swelling    Belen-wound Assessment Red;Swelling    Margins Attached edges    Wound Length (cm) 1 cm    Wound Width (cm) 1 cm    Wound Surface Area (cm^2) 1 cm^2    Drainage Amount None    Non-staged Wound Description Partial thickness    Treatments Cleansed;Site care    Cleansing Normal Saline Irrigation    Periwound Protectant Not Applicable    Dressing Options Open to Air    Dressing Cleansing/Solutions Not Applicable    Dressing Changed New    Dressing Status Clean;Intact;Dry    Dressing Change Frequency Every 48 hrs    NEXT Dressing Change  03/09/19    NEXT Weekly Photo (Inpatient Only) 03/13/19    WOUND NURSE ONLY - Odor None    WOUND NURSE ONLY - Exposed Structures None    WOUND NURSE ONLY - Tissue Type and Percentage 100% red.         Vascular:    Dorsal Pedal pulses:  NA  Posterior tib pulses:   NA    MITALI:      NA    Lab Values:    WBC:       WBC   Date/Time Value Ref Range Status   03/07/2019 10:17 AM 11.7 (H) 4.8 - 10.8 K/uL Final     AIC:      Lab Results   Component Value Date/Time    HBA1C 5.7 (H) 03/06/2019 07:05 PM         Culture:   Obtained Ordered, Results pending specimen.    INTERVENTIONS BY WOUND TEAM:  Patient sleeping, when this RN woke her for wound care she started sobbing stating that she was in considerable  pain. RN aware. Patient able to turn for wound care. Abscesses on the left buttock worse than on the right. All wounds cleaned with saline and gauze, then all wounds dressed with same dressing.     Dressing selection:  Hydrofiber silver, sacral mepilex (1 to each buttock, 1 adhesive foam to medial left buttock).  Left FA: hydrofiber silver and adhesive silicone foam.          Interdisciplinary consultation: Patient, Bedside RN, patient's .    EVALUATION: hydrofiber silver for antimicrobial treatment as well as absorption of exudate without laterally wicking to periwound. Sacral mepilex for absorption and protection.     Factors affecting wound healing: Diabetic, infection.  Goals: Steady decrease in wound area and depth weekly.    NURSING PLAN OF CARE ORDERS (X):    Dressing changes: See Dressing Care orders: X  Skin care: See Skin Care orders:   Rectal tube care: See Rectal Tube Care orders:   Other orders:    RSKIN: CURRENT (X) ORDERED (O):   Q shift Lamont:  X  Q shift pressure point assessments:  X  Pressure redistribution mattress     X       SUSAN          Bariatric SUSAN         Bariatric foam           Heel float boots          Float Heels off Bed with Pillows               Barrier wipes         Barrier Cream         Barrier paste          Sacral silicone dressing         Silicone O2 tubing         Anchorfast         Cannula fixation Device (Tender )          Gray Foam Ear protectors           Trach with Optifoam split foam                 Waffle cushion        Waffle Overlay         Rectal tube or BMS         Antifungal tx      Interdry          Reposition q 2 hours      Patient turns self  Up to chair        Ambulate      PT/OT        Dietician        Diabetes Education      PO  X   TF     TPN     NPO   # days   Other        WOUND TEAM PLAN OF CARE (X):   NPWT change 3 x week:        Dressing changes by wound team:       Follow up as needed:   X    Other (explain):     Anticipated discharge plans  (X):   SNF:           Home Care:           Outpatient Wound Center:            Self Care:            Other:        To be determined

## 2019-03-07 NOTE — ED PROVIDER NOTES
-ED Provider Note    Scribed for Jimy Capellan II, MD by Santiago Flores. 3/6/2019  6:52 PM    Means of Arrival: Walk-in  History obtained by: Patient  Limitations: None    CHIEF COMPLAINT  Chief Complaint   Patient presents with   • Abscess       HPI  Karin Manzo is a 34 y.o. female who presents to the Emergency Department for evaluation of multiple abscesses. She explains that approximately 1 week ago she began to develop a bump on her left forearm.  She says she also had bumps on her left thigh.. She reports that since being on Bactroban there has been no improvement.  She says left thigh has been increasingly painful.  There have been areas of drainage.  Left forearm is also started to drain.  She denies any injection to any of these areas.. She is otherwise a healthy individual and does not have any other pertinent past medical history, including a history of diabetes. She does not have any allergies to medications and is not currently taking any daily medications. She denies any recent fever.     She initially presented on February 27 and findings at that time were consistent with pustules.  She was prescribed Bactroban.    REVIEW OF SYSTEMS  Review of Systems   Constitutional: Negative for fever.   HENT: Negative for congestion and sore throat.    Respiratory: Negative for cough and shortness of breath.    Cardiovascular: Negative for chest pain.   Gastrointestinal: Negative for abdominal pain, nausea and vomiting.   Genitourinary: Negative for dysuria.   Musculoskeletal: Negative for back pain.   Skin:        Positive for painful, growing, and draining abscess to left forearm and left upper thigh    Neurological: Negative for headaches.   Psychiatric/Behavioral: Substance abuse: denies.   All other systems reviewed and are negative.    See HPI for further details.    PAST MEDICAL HISTORY   has a past medical history of Abnormal Pap smear of vagina and vaginal HPV; Alcohol abuse (3/19/2013);  Arthritis (8/2014); Cold; H/O hypotension (2008); Headache(784.0); Heart murmur (2002); HPV in female; Kidney disease (2208); Other specified symptom associated with female genital organs; Unspecified urinary incontinence; and Urinary, incontinence, stress female previously scheduled for surgery with Dr Singh but canceled on the day of (3/20/2015).    SOCIAL HISTORY  Social History     Social History Main Topics   • Smoking status: Former Smoker     Packs/day: 0.25     Years: 4.00     Types: Cigarettes     Quit date: 5/14/2016   • Smokeless tobacco: Never Used      Comment: Pt. states smoked for 4yrs off and on would stop during pregnancy   • Alcohol use Yes      Comment: occ   • Drug use: No   • Sexual activity: Yes     Partners: Male      Comment: [facility]        SURGICAL HISTORY   has a past surgical history that includes mini laparotomy (7/17/2017) and salpingectomy (Left, 7/17/2017).    CURRENT MEDICATIONS    Current Facility-Administered Medications:   •  vancomycin 2,200 mg in  mL IVPB, 25 mg/kg, Intravenous, Once, Jimy Capellan II, M.D., Last Rate: 166.7 mL/hr at 03/06/19 2114, 2,200 mg at 03/06/19 2114  •  senna-docusate (PERICOLACE or SENOKOT S) 8.6-50 MG per tablet 2 Tab, 2 Tab, Oral, BID **AND** polyethylene glycol/lytes (MIRALAX) PACKET 1 Packet, 1 Packet, Oral, QDAY PRN **AND** magnesium hydroxide (MILK OF MAGNESIA) suspension 30 mL, 30 mL, Oral, QDAY PRN **AND** bisacodyl (DULCOLAX) suppository 10 mg, 10 mg, Rectal, QDAY PRN, Lasha Wetzel M.D.  •  NS (BOLUS) infusion 1,000 mL, 1,000 mL, Intravenous, Once PRN, Lasha Wetzel M.D.  •  NS infusion, , Intravenous, Continuous, Lasha Wetzel M.D.  •  MD Alert...Vancomycin per Pharmacy, 1 Each, Other, PHARMACY TO DOSE, Lasha Wetzel M.D.  •  [START ON 3/7/2019] cefTRIAXone (ROCEPHIN) 2 g in  mL IVPB, 2 g, Intravenous, Q24HRS, Lasha Wetzel M.D.  •  Respiratory Care per Protocol, , Nebulization, Continuous RT, Lasha Wetzel M.D.  •  acetaminophen  "(TYLENOL) tablet 650 mg, 650 mg, Oral, Q6HRS PRN, Lasha Wetzel M.D.  •  ondansetron (ZOFRAN) syringe/vial injection 4 mg, 4 mg, Intravenous, Q4HRS PRN, Lasha Wetzel M.D.  •  ondansetron (ZOFRAN ODT) dispertab 4 mg, 4 mg, Oral, Q4HRS PRN, Lasha Wetzel M.D.  •  [START ON 3/7/2019] insulin regular (HUMULIN R) injection 1-6 Units, 1-6 Units, Subcutaneous, 4X/DAY ACHS **AND** [START ON 3/7/2019] Accu-Chek ACHS, , , Q AC AND BEDTIME(S) **AND** NOTIFY MD and PharmD, , , Once **AND** glucose 4 g chewable tablet 16 g, 16 g, Oral, Q15 MIN PRN **AND** dextrose 50% (D50W) injection 25 mL, 25 mL, Intravenous, Q15 MIN PRN, Lasha Wetzel M.D.  •  potassium chloride SA (Kdur) tablet 40 mEq, 40 mEq, Oral, Once, Lasha Wetzel M.D.  •  [START ON 3/7/2019] vancomycin 1,000 mg in  mL IVPB, 12 mg/kg, Intravenous, Q8HR, Lasha Wetzel M.D.  No current outpatient prescriptions on file.      ALLERGIES  Allergies   Allergen Reactions   • Nkda [No Known Drug Allergy]        PHYSICAL EXAM  VITAL SIGNS: /61   Pulse 92   Temp 36.4 °C (97.6 °F) (Temporal)   Resp 16   Ht 1.702 m (5' 7\")   Wt 86.9 kg (191 lb 9.3 oz)   SpO2 97%   BMI 30.01 kg/m²     Pulse ox interpretation: I interpret this pulse ox as normal.  Constitutional: Well-nourished 34 year old  female laying in the gurney in mild distress.  Crying frequently during exam.  HENT: No signs of trauma, Bilateral external ears normal, Nose normal.   Eyes: Pupils are equal, Conjunctiva normal, Non-icteric.   Neck: Normal range of motion, No tenderness, Supple, No stridor.   Lymphatic: No lymphadenopathy noted.   Cardiovascular: Rate in the 90s and regular rhythm, no murmurs. Symmetric distal pulses. No cyanosis of extremities. No peripheral edema of extremities.  Thorax & Lungs: Normal breath sounds, No respiratory distress, No wheezing, No chest tenderness.   Abdomen: Bowel sounds normal, Soft, No tenderness, No masses, No pulsatile masses. No peritoneal signs.  Skin: Left " forearm there is a 4 cm area of erythema with a 1 cm area of fluctuance. Abscess drainage from central region. Left buttock there are multiple areas of circular erythema with purulent drainage from a few lesions.  Eschar at center of lesions.  Tender to palpation. No induration.  Back: No midline bony tenderness, No CVA tenderness.   Musculoskeletal: Good range of motion in all major joints. No major deformities noted.  Soft compartments in bilateral lower extremities.  Neurologic: Alert , Normal motor function, Normal sensory function, No focal deficits noted.   Psychiatric: Affect anxious, tearful.    DIAGNOSTIC STUDIES / PROCEDURES    LABS  Pertinent Labs & Imaging studies reviewed. (See chart for details)    COURSE & MEDICAL DECISION MAKING  Pertinent Labs & Imaging studies reviewed. (See chart for details)    6:52 PM This is a 34 y.o. female who presents with left forearm abscess and left buttocks cellulitis and the differential diagnosis includes but is not limited to abscess, cellultitis, sepsis.  Low suspicion for necrotizing infection based on exam.  Ordered lactic acid, CMP, CBC with differential to evaluate. Patient will be treated with 4 mg morphine injection and 15 mg Toradol injection for symptomatic relief.     7:26 PM Ordered blood culture x 2 for further evaluation. She will be treated with 2 g Rocephin in  mL IVPB.  She will be treated with 2,200 mg Vancomycin in  mL IVPB.     7:47 PM Patient was reevaluated at bedside. Discussed lab results with the patient and informed them that she will need to be admitted to the hospital for IV antibiotics.  Technically he meets sepsis criteria.  She has a heart rate above 90, white count above 15, and a source of infection.  Lactic acid within normal limits.  She does not appear to be dehydrated.  I will not give fluid bolus at this time.  She denies any past admissions to the hospital for prior infections. She understands and verbalizes agreement  with the plan of care.     7:52 PM Ordered Beta-HCG qualitative serum, urinalysis, and blood culture at this time.     8:11 PM Paged hospitalist.     8:39 PM I discussed the patient's case and the above findings with Dr. Wetzel (hospitalist) who agrees to admit the patient for IV antibiotics.  Pregnancy test is negative.  Urine assay still pending.  Cultures are pending.    9:18 PM She will be treated with 4 mg morphine injection secondary to continued pain.       DISPOSITION:  Patient will be admitted to Dr. Wetzel in guarded condition.    FINAL IMPRESSION  1. Cellulitis, unspecified cellulitis site    2. Abscess    3. Sepsis, due to unspecified organism (HCC)          ISantiago (Scribe), am scribing for, and in the presence of, Jimy Capellan II, MD.    Electronically signed by: Santiago Flores (Scribe), 3/6/2019    IJimy II, MD personally performed the services described in this documentation, as scribed by Santiago Flores in my presence, and it is both accurate and complete. C.     The note accurately reflects work and decisions made by me.  Jimy Capellan II  3/6/2019  11:09 PM

## 2019-03-07 NOTE — ASSESSMENT & PLAN NOTE
Secondary to above  Patient does not meet sepsis criteria at this time, monitor CBC and vitals  3/8:  Improving wbc on vanco IV.

## 2019-03-08 LAB
ANION GAP SERPL CALC-SCNC: 6 MMOL/L (ref 0–11.9)
APPEARANCE UR: CLEAR
BACTERIA #/AREA URNS HPF: NEGATIVE /HPF
BASOPHILS # BLD AUTO: 0.2 % (ref 0–1.8)
BASOPHILS # BLD: 0.02 K/UL (ref 0–0.12)
BILIRUB UR QL STRIP.AUTO: NEGATIVE
BUN SERPL-MCNC: 4 MG/DL (ref 8–22)
CALCIUM SERPL-MCNC: 8 MG/DL (ref 8.5–10.5)
CHLORIDE SERPL-SCNC: 110 MMOL/L (ref 96–112)
CO2 SERPL-SCNC: 23 MMOL/L (ref 20–33)
COLOR UR: YELLOW
CREAT SERPL-MCNC: 0.48 MG/DL (ref 0.5–1.4)
EOSINOPHIL # BLD AUTO: 0.17 K/UL (ref 0–0.51)
EOSINOPHIL NFR BLD: 1.7 % (ref 0–6.9)
EPI CELLS #/AREA URNS HPF: ABNORMAL /HPF
ERYTHROCYTE [DISTWIDTH] IN BLOOD BY AUTOMATED COUNT: 42.2 FL (ref 35.9–50)
GLUCOSE BLD-MCNC: 119 MG/DL (ref 65–99)
GLUCOSE BLD-MCNC: 98 MG/DL (ref 65–99)
GLUCOSE SERPL-MCNC: 92 MG/DL (ref 65–99)
GLUCOSE UR STRIP.AUTO-MCNC: NEGATIVE MG/DL
GRAM STN SPEC: NORMAL
HCT VFR BLD AUTO: 32.2 % (ref 37–47)
HGB BLD-MCNC: 10 G/DL (ref 12–16)
IMM GRANULOCYTES # BLD AUTO: 0.05 K/UL (ref 0–0.11)
IMM GRANULOCYTES NFR BLD AUTO: 0.5 % (ref 0–0.9)
KETONES UR STRIP.AUTO-MCNC: NEGATIVE MG/DL
LEUKOCYTE ESTERASE UR QL STRIP.AUTO: ABNORMAL
LYMPHOCYTES # BLD AUTO: 2.13 K/UL (ref 1–4.8)
LYMPHOCYTES NFR BLD: 21.2 % (ref 22–41)
MCH RBC QN AUTO: 27.1 PG (ref 27–33)
MCHC RBC AUTO-ENTMCNC: 31.1 G/DL (ref 33.6–35)
MCV RBC AUTO: 87.3 FL (ref 81.4–97.8)
MICRO URNS: ABNORMAL
MONOCYTES # BLD AUTO: 0.82 K/UL (ref 0–0.85)
MONOCYTES NFR BLD AUTO: 8.1 % (ref 0–13.4)
NEUTROPHILS # BLD AUTO: 6.88 K/UL (ref 2–7.15)
NEUTROPHILS NFR BLD: 68.3 % (ref 44–72)
NITRITE UR QL STRIP.AUTO: NEGATIVE
NRBC # BLD AUTO: 0 K/UL
NRBC BLD-RTO: 0 /100 WBC
PH UR STRIP.AUTO: 7.5 [PH]
PLATELET # BLD AUTO: 283 K/UL (ref 164–446)
PMV BLD AUTO: 9.5 FL (ref 9–12.9)
POTASSIUM SERPL-SCNC: 3.6 MMOL/L (ref 3.6–5.5)
PROT UR QL STRIP: NEGATIVE MG/DL
RBC # BLD AUTO: 3.69 M/UL (ref 4.2–5.4)
RBC # URNS HPF: ABNORMAL /HPF
RBC UR QL AUTO: NEGATIVE
SIGNIFICANT IND 70042: NORMAL
SITE SITE: NORMAL
SODIUM SERPL-SCNC: 139 MMOL/L (ref 135–145)
SOURCE SOURCE: NORMAL
SP GR UR STRIP.AUTO: 1.01
UROBILINOGEN UR STRIP.AUTO-MCNC: 0.2 MG/DL
VANCOMYCIN TROUGH SERPL-MCNC: 14 UG/ML (ref 10–20)
WBC # BLD AUTO: 10.1 K/UL (ref 4.8–10.8)
WBC #/AREA URNS HPF: ABNORMAL /HPF

## 2019-03-08 PROCEDURE — 82962 GLUCOSE BLOOD TEST: CPT | Mod: 91

## 2019-03-08 PROCEDURE — 770006 HCHG ROOM/CARE - MED/SURG/GYN SEMI*

## 2019-03-08 PROCEDURE — 85025 COMPLETE CBC W/AUTO DIFF WBC: CPT

## 2019-03-08 PROCEDURE — 87070 CULTURE OTHR SPECIMN AEROBIC: CPT

## 2019-03-08 PROCEDURE — 700102 HCHG RX REV CODE 250 W/ 637 OVERRIDE(OP): Performed by: HOSPITALIST

## 2019-03-08 PROCEDURE — 80048 BASIC METABOLIC PNL TOTAL CA: CPT

## 2019-03-08 PROCEDURE — 80202 ASSAY OF VANCOMYCIN: CPT

## 2019-03-08 PROCEDURE — 700102 HCHG RX REV CODE 250 W/ 637 OVERRIDE(OP): Performed by: INTERNAL MEDICINE

## 2019-03-08 PROCEDURE — 700105 HCHG RX REV CODE 258: Performed by: INTERNAL MEDICINE

## 2019-03-08 PROCEDURE — 700105 HCHG RX REV CODE 258: Performed by: HOSPITALIST

## 2019-03-08 PROCEDURE — A9270 NON-COVERED ITEM OR SERVICE: HCPCS | Performed by: HOSPITALIST

## 2019-03-08 PROCEDURE — 87086 URINE CULTURE/COLONY COUNT: CPT

## 2019-03-08 PROCEDURE — 87205 SMEAR GRAM STAIN: CPT

## 2019-03-08 PROCEDURE — 36415 COLL VENOUS BLD VENIPUNCTURE: CPT

## 2019-03-08 PROCEDURE — 99233 SBSQ HOSP IP/OBS HIGH 50: CPT | Performed by: HOSPITALIST

## 2019-03-08 PROCEDURE — 87186 SC STD MICRODIL/AGAR DIL: CPT

## 2019-03-08 PROCEDURE — 87077 CULTURE AEROBIC IDENTIFY: CPT

## 2019-03-08 PROCEDURE — 81001 URINALYSIS AUTO W/SCOPE: CPT

## 2019-03-08 PROCEDURE — 700111 HCHG RX REV CODE 636 W/ 250 OVERRIDE (IP): Performed by: INTERNAL MEDICINE

## 2019-03-08 PROCEDURE — A9270 NON-COVERED ITEM OR SERVICE: HCPCS | Performed by: INTERNAL MEDICINE

## 2019-03-08 RX ADMIN — OXYCODONE HYDROCHLORIDE 5 MG: 5 TABLET ORAL at 21:22

## 2019-03-08 RX ADMIN — ACETAMINOPHEN 650 MG: 325 TABLET, FILM COATED ORAL at 07:04

## 2019-03-08 RX ADMIN — OXYCODONE HYDROCHLORIDE 5 MG: 5 TABLET ORAL at 07:04

## 2019-03-08 RX ADMIN — OXYCODONE HYDROCHLORIDE 5 MG: 5 TABLET ORAL at 16:40

## 2019-03-08 RX ADMIN — VANCOMYCIN HYDROCHLORIDE 1000 MG: 100 INJECTION, POWDER, LYOPHILIZED, FOR SOLUTION INTRAVENOUS at 21:22

## 2019-03-08 RX ADMIN — CEFTRIAXONE SODIUM 2 G: 2 INJECTION, POWDER, FOR SOLUTION INTRAMUSCULAR; INTRAVENOUS at 05:14

## 2019-03-08 RX ADMIN — SENNOSIDES AND DOCUSATE SODIUM 2 TABLET: 8.6; 5 TABLET ORAL at 07:03

## 2019-03-08 RX ADMIN — ACETAMINOPHEN 650 MG: 325 TABLET, FILM COATED ORAL at 16:40

## 2019-03-08 RX ADMIN — VANCOMYCIN HYDROCHLORIDE 1000 MG: 100 INJECTION, POWDER, LYOPHILIZED, FOR SOLUTION INTRAVENOUS at 07:04

## 2019-03-08 RX ADMIN — OXYCODONE HYDROCHLORIDE 10 MG: 10 TABLET ORAL at 11:43

## 2019-03-08 RX ADMIN — SODIUM CHLORIDE: 9 INJECTION, SOLUTION INTRAVENOUS at 01:40

## 2019-03-08 RX ADMIN — SENNOSIDES AND DOCUSATE SODIUM 2 TABLET: 8.6; 5 TABLET ORAL at 16:40

## 2019-03-08 RX ADMIN — SODIUM CHLORIDE: 9 INJECTION, SOLUTION INTRAVENOUS at 13:01

## 2019-03-08 RX ADMIN — OXYCODONE HYDROCHLORIDE 5 MG: 5 TABLET ORAL at 00:12

## 2019-03-08 RX ADMIN — VANCOMYCIN HYDROCHLORIDE 1000 MG: 100 INJECTION, POWDER, LYOPHILIZED, FOR SOLUTION INTRAVENOUS at 13:02

## 2019-03-08 ASSESSMENT — ENCOUNTER SYMPTOMS
CHILLS: 0
DIAPHORESIS: 0
VOMITING: 0
CLAUDICATION: 0
FOCAL WEAKNESS: 0
SHORTNESS OF BREATH: 0
DIZZINESS: 0
COUGH: 0
DEPRESSION: 0
CONSTIPATION: 0
HEMOPTYSIS: 0
SPUTUM PRODUCTION: 0
WEAKNESS: 0
ABDOMINAL PAIN: 0
LOSS OF CONSCIOUSNESS: 0
SORE THROAT: 0
BACK PAIN: 0
EYE DISCHARGE: 0
DIARRHEA: 0
EYE PAIN: 0
WHEEZING: 0
FEVER: 0
NECK PAIN: 0
PALPITATIONS: 0
SPEECH CHANGE: 0
HEADACHES: 0
MYALGIAS: 0
SENSORY CHANGE: 0
NAUSEA: 0
BRUISES/BLEEDS EASILY: 0

## 2019-03-08 ASSESSMENT — LIFESTYLE VARIABLES: SUBSTANCE_ABUSE: 0

## 2019-03-08 NOTE — CARE PLAN
Problem: Infection  Goal: Will remain free from infection  Outcome: MET Date Met: 03/07/19      Problem: Bowel/Gastric:  Goal: Normal bowel function is maintained or improved  Outcome: MET Date Met: 03/07/19      Problem: Knowledge Deficit  Goal: Knowledge of the prescribed therapeutic regimen will improve  Outcome: PROGRESSING AS EXPECTED      Problem: Pain Management  Goal: Pain level will decrease to patient's comfort goal  Outcome: PROGRESSING AS EXPECTED

## 2019-03-08 NOTE — PROGRESS NOTES
"Pharmacy Kinetics 34 y.o. female on vancomycin day # 3 3/8/2019    Vancomycin received  Loading dose 2200mg iv once 3/6 @2100  1000mg iv q8h (0500, 1300, 2100)    Indication for Treatment: cellulitis and abscess of gluteal region    Pertinent history per medical record: Admitted on 3/6/2019 for multiple abscesses on left forearm and left thigh/gluteal region. Pt was using Bactroban outpatient without improvement, and has been experiencing increased pain as well as areas of drainage. Blood cultures were negative from admit, but wound cx have not been collected. CT pelvis from 3/7 showed buttocks and thigh cellulitis without visualized focal enhancing abscess.    Other antibiotics:   Ceftriaxone 2g iv q24h    Allergies: Nkda [no known drug allergy]     Concerns for renal function: none    Pertinent cultures to date:   3/6: blood cx - NG  3/6: wound cx - needs to be collected      Recent Labs      197  19   0353   WBC  15.2*  11.7*  10.1   NEUTSPOLYS  77.30*  73.80*  68.30     Recent Labs      19   19019   1017  19   0352   BUN  5*  4*  4*   CREATININE  0.58  0.45*  0.48*   ALBUMIN  3.8   --    --      Recent Labs      19   0352   VANCOTROUGH  14.0     Intake/Output Summary (Last 24 hours) at 19 0803  Last data filed at 19 0000   Gross per 24 hour   Intake             1110 ml   Output                0 ml   Net             1110 ml      Blood pressure (!) 99/67, pulse 71, temperature 36.6 °C (97.8 °F), temperature source Temporal, resp. rate 16, height 1.702 m (5' 7\"), weight 62.4 kg (137 lb 9.1 oz), SpO2 96 %, not currently breastfeeding. Temp (24hrs), Av.3 °C (97.4 °F), Min:36.2 °C (97.1 °F), Max:36.6 °C (97.8 °F)      A/P   1. Vancomycin dose change: none, continue vancomycin 1000mg iv q8h (0500, 1300, 2100)  2. Next vancomycin level: Trough in 1-2 days (not scheduled yet)  3. Goal trough: 12-16mcg/mL for cellulitis  4. Comments: Trough in " goal range for indication, renal function appears to be stable over the interval. Urine output unavailable to assess renal function further.  Pharmacy will continue to monitor closely and adjust doses as needed.    Kylie Morrison, Pharmacy Intern

## 2019-03-08 NOTE — CARE PLAN
Problem: Venous Thromboembolism (VTW)/Deep Vein Thrombosis (DVT) Prevention:  Goal: Patient will participate in Venous Thrombosis (VTE)/Deep Vein Thrombosis (DVT)Prevention Measures  Outcome: PROGRESSING AS EXPECTED  SCDs on    Problem: Pain Management  Goal: Pain level will decrease to patient's comfort goal  Outcome: PROGRESSING AS EXPECTED  PRN pain medication adequately reduces patient's pain

## 2019-03-08 NOTE — CARE PLAN
Problem: Safety  Goal: Will remain free from falls    Intervention: Assess risk factors for falls  Patient is oriented, up self, and uses call light appropriately. Safety and fall precautions in place, call light within reach, bed locked in lowest position.       Problem: Infection  Goal: Will remain free from infection    Intervention: Assess signs and symptoms of infection  Provided patient education on hand hygiene and infection prevention. Dressings to left forearm and sacrum are clean, dry, intact. IV antibiotics in use.       Problem: Pain Management  Goal: Pain level will decrease to patient's comfort goal    Intervention: Follow pain managment plan developed in collaboration with patient and Interdisciplinary Team  Provided patient education on pain management using pain scale. Patient states pain is adequately controlled with oxycodone. Patient verbalized understanding of education and communicates pain level effectively with RN.

## 2019-03-08 NOTE — PROGRESS NOTES
Patient complaint of possible yeast infection and burning with urination. Urinalysis collected and sent to lab per MD order.

## 2019-03-08 NOTE — PROGRESS NOTES
Hospital Medicine Daily Progress Note    Date of Service  3/7/2019    Chief Complaint  34 y.o. female admitted 3/6/2019 with large buttock abscesses with spread to left forearm.    Hospital Course    3/7:  This 33 yo female with no DM, no prior h/o abscesses, no IVDA, develop atraumatic abscess to her left buttock over 1 week ago and was treated with bactroban ointment on ER visit 2/27/19 without improvement.  She states that her drainage and size of her left buttock abscess increased and had spread to her right buttock, then to left forearm.  She had BCx 2 ordered on admission, but no wound culture was obtained in the ER, no I&D performed.  I have ordered CT pelvis given the size of the area of the bilateral buttock abscesses.  She was placed on Rocephin and vancomycin IV on admission.  UDS ordered.*        Consultants/Specialty  none    Code Status  full    Disposition  CT pelvis pending.  Suspect several day of inpatient abx due to size of bilateral buttock abscesses.    Review of Systems  Review of Systems   Constitutional: Negative for chills, diaphoresis, fever and malaise/fatigue.   HENT: Negative for congestion and sore throat.    Eyes: Negative for pain and discharge.   Respiratory: Negative for cough, hemoptysis, sputum production, shortness of breath and wheezing.    Cardiovascular: Negative for chest pain, palpitations, claudication and leg swelling.   Gastrointestinal: Negative for abdominal pain, constipation, diarrhea, melena, nausea and vomiting.   Genitourinary: Negative for dysuria, frequency and urgency.   Musculoskeletal: Negative for back pain, joint pain, myalgias and neck pain.   Skin: Positive for rash (multiple abscesses bilateral buttocks and left forearm). Negative for itching.   Neurological: Negative for dizziness, sensory change, speech change, focal weakness, loss of consciousness, weakness and headaches.   Endo/Heme/Allergies: Does not bruise/bleed easily.   Psychiatric/Behavioral:  Negative for depression, substance abuse and suicidal ideas.        Physical Exam  Temp:  [36.2 °C (97.1 °F)-37 °C (98.6 °F)] 36.2 °C (97.1 °F)  Pulse:  [61-84] 61  Resp:  [17-20] 20  BP: ()/(55-73) 113/73  SpO2:  [95 %-99 %] 99 %    Physical Exam   Constitutional: She is oriented to person, place, and time. She appears well-developed and well-nourished. No distress.   HENT:   Head: Normocephalic and atraumatic.   Nose: Nose normal.   Mouth/Throat: Oropharynx is clear and moist. No oropharyngeal exudate.   Eyes: Pupils are equal, round, and reactive to light. Conjunctivae and EOM are normal. Right eye exhibits no discharge. Left eye exhibits no discharge. No scleral icterus.   Neck: Normal range of motion. Neck supple. No JVD present. No tracheal deviation present. No thyromegaly present.   Cardiovascular: Normal rate, regular rhythm, normal heart sounds and intact distal pulses.  Exam reveals no gallop and no friction rub.    No murmur heard.  Pulmonary/Chest: Effort normal and breath sounds normal. No stridor. No respiratory distress. She has no wheezes. She has no rales. She exhibits no tenderness.   Abdominal: Soft. Bowel sounds are normal. She exhibits no distension and no mass. There is no tenderness. There is no rebound and no guarding.   Musculoskeletal: Normal range of motion. She exhibits edema and tenderness.   Extensive area of cellulitis, erythema of skin surrounding abscess bilateral buttocks left worse than right.     Smaller area of abscess left forearm volar surface without surrounding eyrythema.   Lymphadenopathy:     She has no cervical adenopathy.   Neurological: She is alert and oriented to person, place, and time. No cranial nerve deficit. She exhibits normal muscle tone. Coordination normal.   Skin: Skin is warm and dry. No rash noted. She is not diaphoretic. There is erythema.   Psychiatric: She has a normal mood and affect. Her behavior is normal. Judgment and thought content normal.    Nursing note and vitals reviewed.      Fluids    Intake/Output Summary (Last 24 hours) at 03/07/19 1919  Last data filed at 03/07/19 0100   Gross per 24 hour   Intake           1080.1 ml   Output                0 ml   Net           1080.1 ml       Laboratory  Recent Labs      03/06/19 1905 03/07/19   1017   WBC  15.2*  11.7*   RBC  4.32  3.84*   HEMOGLOBIN  11.9*  10.4*   HEMATOCRIT  37.8  33.6*   MCV  87.5  87.5   MCH  27.5  27.1   MCHC  31.5*  31.0*   RDW  41.5  42.6   PLATELETCT  333  294   MPV  9.9  9.6     Recent Labs      03/06/19 1905 03/07/19   1017   SODIUM  141  138   POTASSIUM  3.2*  3.8   CHLORIDE  108  110   CO2  24  21   GLUCOSE  68  105*   BUN  5*  4*   CREATININE  0.58  0.45*   CALCIUM  8.4*  7.3*                   Imaging  CT-PELVIS WITH    (Results Pending)        Assessment/Plan  Abscess and cellulitis of gluteal region- (present on admission)   Assessment & Plan    Patient is been on IV vancomycin and IV ceftriaxone, monitor for vancomycin toxicity and follow trough levels  Follow blood and wound cultures  Wound care  Pain control with Tylenol and oxycodone     Abscess of left forearm- (present on admission)   Assessment & Plan    Management as above     Leukocytosis- (present on admission)   Assessment & Plan    Secondary to above  Patient does not meet sepsis criteria at this time, monitor CBC and vitals     Hypokalemia- (present on admission)   Assessment & Plan    Replete with K Dur 40  Monitor BMP          VTE prophylaxis: SCDs.

## 2019-03-08 NOTE — PROGRESS NOTES
Received report from day shift RN. Assumed patient care  AOx4   Pain rated at 7/10, medicated per MAR  Room air  Bilateral buttock dressings intact. Left arm dressing intact  +void  Ambulates independently with steady gait  IV fluids infusing  Call light within reach  Bed locked and in low position   POC discussed with patient  All needs met at this time.

## 2019-03-08 NOTE — PROGRESS NOTES
Pt sleeping most of day between care. Between pain meds given, pt seen sleeping and did not even wake up when her meal arrived. Had to gently touch pt's shoulder to offer meals. Pain appears to be well controlled as pt is cuddling with her son and sleeping with him in bed.   Hospitalist did adjust her pain medication adding morphine.   CT scan also ordered, it was scheduled for 1730.

## 2019-03-09 LAB
ANION GAP SERPL CALC-SCNC: 7 MMOL/L (ref 0–11.9)
BASOPHILS # BLD AUTO: 0.1 % (ref 0–1.8)
BASOPHILS # BLD: 0.01 K/UL (ref 0–0.12)
BUN SERPL-MCNC: 9 MG/DL (ref 8–22)
CALCIUM SERPL-MCNC: 8.4 MG/DL (ref 8.5–10.5)
CHLORIDE SERPL-SCNC: 108 MMOL/L (ref 96–112)
CO2 SERPL-SCNC: 24 MMOL/L (ref 20–33)
CREAT SERPL-MCNC: 0.58 MG/DL (ref 0.5–1.4)
EOSINOPHIL # BLD AUTO: 0.2 K/UL (ref 0–0.51)
EOSINOPHIL NFR BLD: 2.1 % (ref 0–6.9)
ERYTHROCYTE [DISTWIDTH] IN BLOOD BY AUTOMATED COUNT: 41.1 FL (ref 35.9–50)
GLUCOSE BLD-MCNC: 95 MG/DL (ref 65–99)
GLUCOSE SERPL-MCNC: 98 MG/DL (ref 65–99)
HCT VFR BLD AUTO: 34.1 % (ref 37–47)
HGB BLD-MCNC: 10.7 G/DL (ref 12–16)
IMM GRANULOCYTES # BLD AUTO: 0.08 K/UL (ref 0–0.11)
IMM GRANULOCYTES NFR BLD AUTO: 0.8 % (ref 0–0.9)
LYMPHOCYTES # BLD AUTO: 2.18 K/UL (ref 1–4.8)
LYMPHOCYTES NFR BLD: 22.8 % (ref 22–41)
MCH RBC QN AUTO: 27 PG (ref 27–33)
MCHC RBC AUTO-ENTMCNC: 31.4 G/DL (ref 33.6–35)
MCV RBC AUTO: 85.9 FL (ref 81.4–97.8)
MONOCYTES # BLD AUTO: 0.7 K/UL (ref 0–0.85)
MONOCYTES NFR BLD AUTO: 7.3 % (ref 0–13.4)
NEUTROPHILS # BLD AUTO: 6.39 K/UL (ref 2–7.15)
NEUTROPHILS NFR BLD: 66.9 % (ref 44–72)
NRBC # BLD AUTO: 0 K/UL
NRBC BLD-RTO: 0 /100 WBC
PLATELET # BLD AUTO: 325 K/UL (ref 164–446)
PMV BLD AUTO: 9.4 FL (ref 9–12.9)
POTASSIUM SERPL-SCNC: 3.6 MMOL/L (ref 3.6–5.5)
RBC # BLD AUTO: 3.97 M/UL (ref 4.2–5.4)
SODIUM SERPL-SCNC: 139 MMOL/L (ref 135–145)
WBC # BLD AUTO: 9.6 K/UL (ref 4.8–10.8)

## 2019-03-09 PROCEDURE — 770006 HCHG ROOM/CARE - MED/SURG/GYN SEMI*

## 2019-03-09 PROCEDURE — 82962 GLUCOSE BLOOD TEST: CPT

## 2019-03-09 PROCEDURE — 700102 HCHG RX REV CODE 250 W/ 637 OVERRIDE(OP): Performed by: INTERNAL MEDICINE

## 2019-03-09 PROCEDURE — 85025 COMPLETE CBC W/AUTO DIFF WBC: CPT

## 2019-03-09 PROCEDURE — A9270 NON-COVERED ITEM OR SERVICE: HCPCS | Performed by: INTERNAL MEDICINE

## 2019-03-09 PROCEDURE — 700102 HCHG RX REV CODE 250 W/ 637 OVERRIDE(OP): Performed by: HOSPITALIST

## 2019-03-09 PROCEDURE — 700105 HCHG RX REV CODE 258: Performed by: INTERNAL MEDICINE

## 2019-03-09 PROCEDURE — 36415 COLL VENOUS BLD VENIPUNCTURE: CPT

## 2019-03-09 PROCEDURE — 99232 SBSQ HOSP IP/OBS MODERATE 35: CPT | Performed by: HOSPITALIST

## 2019-03-09 PROCEDURE — 80048 BASIC METABOLIC PNL TOTAL CA: CPT

## 2019-03-09 PROCEDURE — 700111 HCHG RX REV CODE 636 W/ 250 OVERRIDE (IP): Performed by: INTERNAL MEDICINE

## 2019-03-09 PROCEDURE — A9270 NON-COVERED ITEM OR SERVICE: HCPCS | Performed by: HOSPITALIST

## 2019-03-09 RX ADMIN — ACETAMINOPHEN 650 MG: 325 TABLET, FILM COATED ORAL at 06:06

## 2019-03-09 RX ADMIN — OXYCODONE HYDROCHLORIDE 5 MG: 5 TABLET ORAL at 13:39

## 2019-03-09 RX ADMIN — SENNOSIDES AND DOCUSATE SODIUM 2 TABLET: 8.6; 5 TABLET ORAL at 06:06

## 2019-03-09 RX ADMIN — ACETAMINOPHEN 650 MG: 325 TABLET, FILM COATED ORAL at 17:11

## 2019-03-09 RX ADMIN — VANCOMYCIN HYDROCHLORIDE 1000 MG: 100 INJECTION, POWDER, LYOPHILIZED, FOR SOLUTION INTRAVENOUS at 06:07

## 2019-03-09 RX ADMIN — VANCOMYCIN HYDROCHLORIDE 1000 MG: 100 INJECTION, POWDER, LYOPHILIZED, FOR SOLUTION INTRAVENOUS at 13:40

## 2019-03-09 RX ADMIN — ACETAMINOPHEN 650 MG: 325 TABLET, FILM COATED ORAL at 23:07

## 2019-03-09 RX ADMIN — VANCOMYCIN HYDROCHLORIDE 1000 MG: 100 INJECTION, POWDER, LYOPHILIZED, FOR SOLUTION INTRAVENOUS at 20:35

## 2019-03-09 RX ADMIN — OXYCODONE HYDROCHLORIDE 5 MG: 5 TABLET ORAL at 20:35

## 2019-03-09 ASSESSMENT — ENCOUNTER SYMPTOMS
NECK PAIN: 0
PALPITATIONS: 0
HEADACHES: 0
SPUTUM PRODUCTION: 0
SORE THROAT: 0
FOCAL WEAKNESS: 0
WHEEZING: 0
COUGH: 0
FEVER: 0
VOMITING: 0
DIARRHEA: 0
SHORTNESS OF BREATH: 0
ABDOMINAL PAIN: 0
EYE DISCHARGE: 0
SPEECH CHANGE: 0
CONSTIPATION: 0
LOSS OF CONSCIOUSNESS: 0
BACK PAIN: 0
DEPRESSION: 0
EYE PAIN: 0
DIZZINESS: 0
HEMOPTYSIS: 0
CHILLS: 0
NAUSEA: 0
CLAUDICATION: 0
DIAPHORESIS: 0
BRUISES/BLEEDS EASILY: 0
SENSORY CHANGE: 0
WEAKNESS: 0
MYALGIAS: 0

## 2019-03-09 ASSESSMENT — LIFESTYLE VARIABLES: SUBSTANCE_ABUSE: 0

## 2019-03-09 NOTE — CARE PLAN
Problem: Safety  Goal: Will remain free from injury  Outcome: PROGRESSING AS EXPECTED  Educated patient of fall risk, and precautions in place as charted

## 2019-03-09 NOTE — PROGRESS NOTES
· 2 RN skin check complete with KENDY Kendall.  · Devices in place N/A.  · Skin assessed under devices N/A.  · Confirmed pressure ulcers found on None.  · Redness on buttocks, redness and swelling on L arm secondary to abscess..  · The following interventions in place dressings placed on abscess site, moisturizers applied.

## 2019-03-09 NOTE — PROGRESS NOTES
"Pharmacy Kinetics 34 y.o. female on vancomycin day # 4 3/9/2019    Currently on Vancomycin 1000 mg iv q8hr (0500, 1300, 2100)    Indication for Treatment: cellulitis and abscess of gluteal region     Pertinent history per medical record: Admitted on 3/6/2019 for multiple abscesses on left forearm and left thigh/gluteal region. Pt was using Bactroban outpatient without improvement, and has been experiencing increased pain as well as areas of drainage. Blood cultures were negative from admit, but wound cx have not been collected. CT pelvis from 3/7 showed buttocks and thigh cellulitis without visualized focal enhancing abscess    Other antibiotics: none    Allergies: Nkda [no known drug allergy]     List concerns for renal function: limited concern for renal dysfunction    Pertinent cultures to date:   3/6: blood cx - NG  3/8: wound cx - in process    MRSA nares swab if pneumonia is a concern (ordered/positive/negative/n-a): N/A    Recent Labs      19   1905  19   1017  19   0353  19   0419   WBC  15.2*  11.7*  10.1  9.6   NEUTSPOLYS  77.30*  73.80*  68.30  66.90     Recent Labs      19   1905  19   1017  19   0352  19   0419   BUN  5*  4*  4*  9   CREATININE  0.58  0.45*  0.48*  0.58   ALBUMIN  3.8   --    --    --      Recent Labs      19   0352   VANCOTROUGH  14.0     Intake/Output Summary (Last 24 hours) at 19 1423  Last data filed at 19 1800   Gross per 24 hour   Intake              240 ml   Output                0 ml   Net              240 ml      Blood pressure 118/63, pulse (!) 57, temperature 36.1 °C (97 °F), temperature source Temporal, resp. rate 16, height 1.702 m (5' 7\"), weight 66.9 kg (147 lb 7.8 oz), SpO2 98 %, not currently breastfeeding. Temp (24hrs), Av.5 °C (97.7 °F), Min:36.1 °C (97 °F), Max:36.9 °C (98.5 °F)      A/P   1. Vancomycin dose change: none  2. Next vancomycin level: in 1-2 days or sooner if clinically " indicated  3. Goal trough: 12-16 mcg/mL  4. Comments: Previous vancomycin trough within goal range with minimal changes to renal indices.  Wound cultures were obtained 3/8 and continue to be in process, gram stain positive for rare GPC. Wound care completed 3/7.  Will monitor further culture results and recommend prompt transition to PO antibiotics for remainder of treatment course.    Jean Nunez, VenkataD, BCPS

## 2019-03-09 NOTE — PROGRESS NOTES
Pt and spouse were educated on effects of oxycodone medication to BP.  Pt and spouse verbalized understanding and wishes to have medication administered.

## 2019-03-09 NOTE — PROGRESS NOTES
[facility] Medicine Daily Progress Note    Date of Service  3/8/2019    Chief Complaint  34 y.o. female admitted 3/6/2019 with large buttock abscesses with spread to left forearm.    Hospital Course    3/7:  This 33 yo female with no DM, no prior h/o abscesses, no IVDA, develop atraumatic abscess to her left buttock over 1 week ago and was treated with bactroban ointment on ER visit 2/27/19 without improvement.  She states that her drainage and size of her left buttock abscess increased and had spread to her right buttock, then to left forearm.  She had BCx 2 ordered on admission, but no wound culture was obtained in the ER, no I&D performed.  I have ordered CT pelvis given the size of the area of the bilateral buttock abscesses.  She was placed on Rocephin and vancomycin IV on admission.  UDS ordered.  3/8:  UDS negative.  CT pelvis, no underlying abscess seen.  Improvement of pain and drainage.  Bedside RN obtained wound culture from left forearm.  Continue vancomycin since appears to be staph species with pustules.  Appearance c/w folliculitis given widespread infection.  dc'd rocephin IV. Patient had complained of dysuria, UA negative.  Tolerating po diet well, dc'd IVFs.*        Consultants/Specialty  none    Code Status  full    Disposition  Likely home with family once medically cleared, no needs.    Review of Systems  Review of Systems   Constitutional: Negative for chills, diaphoresis, fever and malaise/fatigue.   HENT: Negative for congestion and sore throat.    Eyes: Negative for pain and discharge.   Respiratory: Negative for cough, hemoptysis, sputum production, shortness of breath and wheezing.    Cardiovascular: Negative for chest pain, palpitations, claudication and leg swelling.   Gastrointestinal: Negative for abdominal pain, constipation, diarrhea, melena, nausea and vomiting.   Genitourinary: Negative for dysuria, frequency and urgency.   Musculoskeletal: Negative for back pain, joint pain,  myalgias and neck pain.   Skin: Positive for rash (multiple abscesses bilateral buttocks and left forearm). Negative for itching.   Neurological: Negative for dizziness, sensory change, speech change, focal weakness, loss of consciousness, weakness and headaches.   Endo/Heme/Allergies: Does not bruise/bleed easily.   Psychiatric/Behavioral: Negative for depression, substance abuse and suicidal ideas.        Physical Exam  Temp:  [36.2 °C (97.1 °F)-36.9 °C (98.5 °F)] 36.9 °C (98.5 °F)  Pulse:  [63-71] 68  Resp:  [16-18] 16  BP: ()/(44-80) 101/56  SpO2:  [95 %-98 %] 96 %    Physical Exam   Constitutional: She is oriented to person, place, and time. She appears well-developed and well-nourished. No distress.   HENT:   Head: Normocephalic and atraumatic.   Nose: Nose normal.   Mouth/Throat: Oropharynx is clear and moist. No oropharyngeal exudate.   Eyes: Pupils are equal, round, and reactive to light. Conjunctivae and EOM are normal. Right eye exhibits no discharge. Left eye exhibits no discharge. No scleral icterus.   Neck: Normal range of motion. Neck supple. No JVD present. No tracheal deviation present. No thyromegaly present.   Cardiovascular: Normal rate, regular rhythm, normal heart sounds and intact distal pulses.  Exam reveals no gallop and no friction rub.    No murmur heard.  Pulmonary/Chest: Effort normal and breath sounds normal. No stridor. No respiratory distress. She has no wheezes. She has no rales. She exhibits no tenderness.   Abdominal: Soft. Bowel sounds are normal. She exhibits no distension and no mass. There is no tenderness. There is no rebound and no guarding.   Musculoskeletal: Normal range of motion. She exhibits edema and tenderness.   improving cellulitis, erythema of skin surrounding abscess bilateral buttocks right worse than left.    Smaller area of abscess left forearm volar surface without surrounding eyrythema.   Lymphadenopathy:     She has no cervical adenopathy.    Neurological: She is alert and oriented to person, place, and time. No cranial nerve deficit. She exhibits normal muscle tone. Coordination normal.   Skin: Skin is warm and dry. No rash noted. She is not diaphoretic. There is erythema.   Psychiatric: She has a normal mood and affect. Her behavior is normal. Judgment and thought content normal.   Nursing note and vitals reviewed.      Fluids    Intake/Output Summary (Last 24 hours) at 03/08/19 2017  Last data filed at 03/08/19 1800   Gross per 24 hour   Intake             1830 ml   Output                0 ml   Net             1830 ml       Laboratory  Recent Labs      03/06/19   1905 03/07/19   1017  03/08/19   0353   WBC  15.2*  11.7*  10.1   RBC  4.32  3.84*  3.69*   HEMOGLOBIN  11.9*  10.4*  10.0*   HEMATOCRIT  37.8  33.6*  32.2*   MCV  87.5  87.5  87.3   MCH  27.5  27.1  27.1   MCHC  31.5*  31.0*  31.1*   RDW  41.5  42.6  42.2   PLATELETCT  333  294  283   MPV  9.9  9.6  9.5     Recent Labs      03/06/19 1905 03/07/19   1017  03/08/19   0352   SODIUM  141  138  139   POTASSIUM  3.2*  3.8  3.6   CHLORIDE  108  110  110   CO2  24  21  23   GLUCOSE  68  105*  92   BUN  5*  4*  4*   CREATININE  0.58  0.45*  0.48*   CALCIUM  8.4*  7.3*  8.0*                   Imaging  CT-PELVIS WITH   Final Result         1.  Buttocks and thigh cellulitis without visualized focal enhancing abscess.   2.  Retroflexed uterus           Assessment/Plan  Abscess and cellulitis of gluteal region- (present on admission)   Assessment & Plan    Patient is been on IV vancomycin and IV ceftriaxone, monitor for vancomycin toxicity and follow trough levels  Follow blood and wound cultures  Wound care  Pain control with Tylenol and oxycodone  3/8:  Improving on IV vancomycin.  Wound culture obtained left forearm abscess.  Appearance of staph species.  Buttock appearance more c/w extensive folliculitis.  CT pelvis negative for deep abscess formation.     Abscess of left forearm- (present on  admission)   Assessment & Plan    Management as above     Leukocytosis- (present on admission)   Assessment & Plan    Secondary to above  Patient does not meet sepsis criteria at this time, monitor CBC and vitals  3/8:  Improving wbc on vanco IV.     Hypokalemia- (present on admission)   Assessment & Plan    Replete with K Dur 40  Monitor BMP          VTE prophylaxis: SCDs.

## 2019-03-09 NOTE — CARE PLAN
Problem: Knowledge Deficit  Goal: Knowledge of disease process/condition, treatment plan, diagnostic tests, and medications will improve  Outcome: PROGRESSING AS EXPECTED  Provided education regarding infection prevention and plan of care.  Pt and family able to teach back information.    Problem: Pain Management  Goal: Pain level will decrease to patient's comfort goal  Outcome: PROGRESSING AS EXPECTED  Administered pain medications per MAR, teach non pharmacological techniques such as relaxation, imagery

## 2019-03-10 VITALS
HEIGHT: 67 IN | TEMPERATURE: 98.3 F | BODY MASS INDEX: 23.15 KG/M2 | RESPIRATION RATE: 16 BRPM | OXYGEN SATURATION: 94 % | HEART RATE: 50 BPM | DIASTOLIC BLOOD PRESSURE: 62 MMHG | SYSTOLIC BLOOD PRESSURE: 103 MMHG | WEIGHT: 147.49 LBS

## 2019-03-10 PROBLEM — L73.8 BACTERIAL FOLLICULITIS: Status: ACTIVE | Noted: 2019-03-10

## 2019-03-10 PROBLEM — B95.62 MRSA CELLULITIS: Status: ACTIVE | Noted: 2019-03-10

## 2019-03-10 PROBLEM — L03.90 MRSA CELLULITIS: Status: ACTIVE | Noted: 2019-03-10

## 2019-03-10 PROBLEM — D50.9 IRON DEFICIENCY ANEMIA: Status: ACTIVE | Noted: 2019-03-10

## 2019-03-10 LAB
ANION GAP SERPL CALC-SCNC: 7 MMOL/L (ref 0–11.9)
BACTERIA WND AEROBE CULT: ABNORMAL
BACTERIA WND AEROBE CULT: ABNORMAL
BASOPHILS # BLD AUTO: 0.3 % (ref 0–1.8)
BASOPHILS # BLD: 0.03 K/UL (ref 0–0.12)
BUN SERPL-MCNC: 11 MG/DL (ref 8–22)
CALCIUM SERPL-MCNC: 7.7 MG/DL (ref 8.5–10.5)
CHLORIDE SERPL-SCNC: 110 MMOL/L (ref 96–112)
CO2 SERPL-SCNC: 23 MMOL/L (ref 20–33)
CREAT SERPL-MCNC: 0.59 MG/DL (ref 0.5–1.4)
EOSINOPHIL # BLD AUTO: 0.22 K/UL (ref 0–0.51)
EOSINOPHIL NFR BLD: 2.5 % (ref 0–6.9)
ERYTHROCYTE [DISTWIDTH] IN BLOOD BY AUTOMATED COUNT: 42.7 FL (ref 35.9–50)
GLUCOSE SERPL-MCNC: 113 MG/DL (ref 65–99)
GRAM STN SPEC: ABNORMAL
HCT VFR BLD AUTO: 36.3 % (ref 37–47)
HGB BLD-MCNC: 11.1 G/DL (ref 12–16)
IMM GRANULOCYTES # BLD AUTO: 0.27 K/UL (ref 0–0.11)
IMM GRANULOCYTES NFR BLD AUTO: 3.1 % (ref 0–0.9)
IRON SATN MFR SERPL: 10 % (ref 15–55)
IRON SERPL-MCNC: 39 UG/DL (ref 40–170)
LYMPHOCYTES # BLD AUTO: 2.61 K/UL (ref 1–4.8)
LYMPHOCYTES NFR BLD: 30.1 % (ref 22–41)
MCH RBC QN AUTO: 26.8 PG (ref 27–33)
MCHC RBC AUTO-ENTMCNC: 30.6 G/DL (ref 33.6–35)
MCV RBC AUTO: 87.7 FL (ref 81.4–97.8)
MONOCYTES # BLD AUTO: 0.87 K/UL (ref 0–0.85)
MONOCYTES NFR BLD AUTO: 10 % (ref 0–13.4)
NEUTROPHILS # BLD AUTO: 4.68 K/UL (ref 2–7.15)
NEUTROPHILS NFR BLD: 54 % (ref 44–72)
NRBC # BLD AUTO: 0 K/UL
NRBC BLD-RTO: 0 /100 WBC
PLATELET # BLD AUTO: 374 K/UL (ref 164–446)
PMV BLD AUTO: 9.6 FL (ref 9–12.9)
POTASSIUM SERPL-SCNC: 3.6 MMOL/L (ref 3.6–5.5)
RBC # BLD AUTO: 4.14 M/UL (ref 4.2–5.4)
SIGNIFICANT IND 70042: ABNORMAL
SITE SITE: ABNORMAL
SODIUM SERPL-SCNC: 140 MMOL/L (ref 135–145)
SOURCE SOURCE: ABNORMAL
TIBC SERPL-MCNC: 386 UG/DL (ref 250–450)
WBC # BLD AUTO: 8.7 K/UL (ref 4.8–10.8)

## 2019-03-10 PROCEDURE — 99239 HOSP IP/OBS DSCHRG MGMT >30: CPT | Performed by: HOSPITALIST

## 2019-03-10 PROCEDURE — A9270 NON-COVERED ITEM OR SERVICE: HCPCS | Performed by: INTERNAL MEDICINE

## 2019-03-10 PROCEDURE — 80048 BASIC METABOLIC PNL TOTAL CA: CPT

## 2019-03-10 PROCEDURE — 700105 HCHG RX REV CODE 258: Performed by: INTERNAL MEDICINE

## 2019-03-10 PROCEDURE — 85025 COMPLETE CBC W/AUTO DIFF WBC: CPT

## 2019-03-10 PROCEDURE — 700111 HCHG RX REV CODE 636 W/ 250 OVERRIDE (IP): Performed by: INTERNAL MEDICINE

## 2019-03-10 PROCEDURE — 83540 ASSAY OF IRON: CPT

## 2019-03-10 PROCEDURE — 83550 IRON BINDING TEST: CPT

## 2019-03-10 PROCEDURE — A6213 FOAM DRG >16<=48 SQ IN W/BDR: HCPCS | Performed by: HOSPITALIST

## 2019-03-10 PROCEDURE — A9270 NON-COVERED ITEM OR SERVICE: HCPCS | Performed by: HOSPITALIST

## 2019-03-10 PROCEDURE — 36415 COLL VENOUS BLD VENIPUNCTURE: CPT

## 2019-03-10 PROCEDURE — 700102 HCHG RX REV CODE 250 W/ 637 OVERRIDE(OP): Performed by: INTERNAL MEDICINE

## 2019-03-10 PROCEDURE — 700102 HCHG RX REV CODE 250 W/ 637 OVERRIDE(OP): Performed by: HOSPITALIST

## 2019-03-10 RX ORDER — OXYCODONE HYDROCHLORIDE 5 MG/1
5 TABLET ORAL EVERY 6 HOURS PRN
Qty: 20 TAB | Refills: 0 | Status: SHIPPED | OUTPATIENT
Start: 2019-03-10 | End: 2019-03-15

## 2019-03-10 RX ORDER — SULFAMETHOXAZOLE AND TRIMETHOPRIM 800; 160 MG/1; MG/1
1 TABLET ORAL 2 TIMES DAILY
Qty: 20 TAB | Refills: 0 | Status: SHIPPED | OUTPATIENT
Start: 2019-03-10 | End: 2019-03-20

## 2019-03-10 RX ORDER — ASCORBIC ACID 500 MG
500 TABLET ORAL DAILY
Qty: 30 TAB | Refills: 3 | Status: SHIPPED | OUTPATIENT
Start: 2019-03-10 | End: 2019-12-05

## 2019-03-10 RX ORDER — LANOLIN ALCOHOL/MO/W.PET/CERES
325 CREAM (GRAM) TOPICAL DAILY
Qty: 30 TAB | Refills: 3 | Status: SHIPPED | OUTPATIENT
Start: 2019-03-10 | End: 2019-12-05

## 2019-03-10 RX ADMIN — OXYCODONE HYDROCHLORIDE 5 MG: 5 TABLET ORAL at 04:54

## 2019-03-10 RX ADMIN — VANCOMYCIN HYDROCHLORIDE 1000 MG: 100 INJECTION, POWDER, LYOPHILIZED, FOR SOLUTION INTRAVENOUS at 04:54

## 2019-03-10 RX ADMIN — SENNOSIDES AND DOCUSATE SODIUM 2 TABLET: 8.6; 5 TABLET ORAL at 04:53

## 2019-03-10 NOTE — DISCHARGE INSTRUCTIONS
Discharge Instructions    Discharged to home by car with relative. Discharged via wheelchair, hospital escort: Yes.  Special equipment needed: Not Applicable    Be sure to schedule a follow-up appointment with your primary care doctor or any specialists as instructed.     Discharge Plan:   Diet Plan: Discussed  Activity Level: Discussed  Confirmed Follow up Appointment: Patient to Call and Schedule Appointment  Confirmed Symptoms Management: Discussed  Medication Reconciliation Updated: Yes  Pneumococcal Vaccine Administered/Refused: Not given - Patient refused pneumococcal vaccine  Influenza Vaccine Indication: Patient Refuses    I understand that a diet low in cholesterol, fat, and sodium is recommended for good health. Unless I have been given specific instructions below for another diet, I accept this instruction as my diet prescription.   Other diet: Regular                                                                                                                       Dressing Change    Change every 48 hours. Clean with normal saline solution and let dry. Cut silver dressing to size of wounds. Apply dry mepilex over each area.    A dressing is a material placed over wounds. It keeps the wound clean, dry, and protected from further injury. This provides an environment that favors wound healing.   BEFORE YOU BEGIN  Get your supplies together. Things you may need include:  Saline solution.  Flexible gauze dressing.  Medicated cream.  Tape.  Gloves.  Abdominal dressing pads.  Gauze squares.  Plastic bags.  Take pain medicine 30 minutes before the dressing change if you need it.  Take a shower before you do the first dressing change of the day. Use plastic wrap or a plastic bag to prevent the dressing from getting wet.  REMOVING YOUR OLD DRESSING   Wash your hands with soap and water. Dry your hands with a clean towel.  Put on your gloves.  Remove any tape.  Carefully remove the old dressing. If the dressing  sticks, you may dampen it with warm water to loosen it, or follow your caregiver's specific directions.  Remove any gauze or packing tape that is in your wound.  Take off your gloves.  Put the gloves, tape, gauze, or any packing tape into a plastic bag.  CHANGING YOUR DRESSING  Open the supplies.  Take the cap off the saline solution.  Open the gauze package so that the gauze remains on the inside of the package.  Put on your gloves.  Clean your wound as told by your caregiver.  If you have been told to keep your wound dry, follow those instructions.  Your caregiver may tell you to do one or more of the following:   the gauze. Pour the saline solution over the gauze. Squeeze out the extra saline solution.  Put medicated cream or other medicine on your wound if you have been told to do so.  Put the solution soaked gauze only in your wound, not on the skin around it.  Pack your wound loosely or as told by your caregiver.  Put dry gauze on your wound.  Put abdominal dressing pads over the dry gauze if your wet gauze soaks through.  Tape the abdominal dressing pads in place so they will not fall off. Do not wrap the tape completely around the affected part (arm, leg, abdomen).  Wrap the dressing pads with a flexible gauze dressing to secure it in place.  Take off your gloves. Put them in the plastic bag with the old dressing. Tie the bag shut and throw it away.  Keep the dressing clean and dry until your next dressing change.  Wash your hands.  SEEK MEDICAL CARE IF:  Your skin around the wound looks red.  Your wound feels more tender or sore.  You see pus in the wound.  Your wound smells bad.  You have a fever.  Your skin around the wound has a rash that itches and burns.  You see black or yellow skin in your wound that was not there before.  You feel nauseous, throw up, and feel very tired.     This information is not intended to replace advice given to you by your health care provider. Make sure you discuss any  questions you have with your health care provider.     Document Released: 01/25/2006 Document Revised: 03/11/2013 Document Reviewed: 10/29/2012  [employer] Interactive Patient Education ©2016 [employer]    Depression / Suicide Risk    As you are discharged from this [facility] facility, it is important to learn how to keep safe from harming yourself.    Recognize the warning signs:  · Abrupt changes in personality, positive or negative- including increase in energy   · Giving away possessions  · Change in eating patterns- significant weight changes-  positive or negative  · Change in sleeping patterns- unable to sleep or sleeping all the time   · Unwillingness or inability to communicate  · Depression  · Unusual sadness, discouragement and loneliness  · Talk of wanting to die  · Neglect of personal appearance   · Rebelliousness- reckless behavior  · Withdrawal from people/activities they love  · Confusion- inability to concentrate     If you or a loved one observes any of these behaviors or has concerns about self-harm, here's what you can do:  · Talk about it- your feelings and reasons for harming yourself  · Remove any means that you might use to hurt yourself (examples: pills, rope, extension cords, firearm)  · Get professional help from the community (Mental Health, Substance Abuse, psychological counseling)  · Do not be alone:Call your Safe Contact- someone whom you trust who will be there for you.  · Call your local CRISIS HOTLINE 594-8490 or 077-057-8005  · Call your local Children's Mobile Crisis Response Team [place] (635) 840-8444 or www.Labfolder  · Call the toll free National Suicide Prevention Hotlines   · National Suicide Prevention Lifeline 113-001-WHGA (0583)  · National Hope Line Network 800-SUICIDE (636-0633)      Cellulitis, Adult  Introduction  Cellulitis is a skin infection. The infected area is usually red and sore. This condition occurs most often in the arms and lower legs.  It is very important to get treated for this condition.  Follow these instructions at home:  · Take over-the-counter and prescription medicines only as told by your doctor.  · If you were prescribed an antibiotic medicine, take it as told by your doctor. Do not stop taking the antibiotic even if you start to feel better.  · Drink enough fluid to keep your pee (urine) clear or pale yellow.  · Do not touch or rub the infected area.  · Raise (elevate) the infected area above the level of your heart while you are sitting or lying down.  · Place warm or cold wet cloths (warm or cold compresses) on the infected area. Do this as told by your doctor.  · Keep all follow-up visits as told by your doctor. This is important. These visits let your doctor make sure your infection is not getting worse.  Contact a doctor if:  · You have a fever.  · Your symptoms do not get better after 1-2 days of treatment.  · Your bone or joint under the infected area starts to hurt after the skin has healed.  · Your infection comes back. This can happen in the same area or another area.  · You have a swollen bump in the infected area.  · You have new symptoms.  · You feel ill and also have muscle aches and pains.  Get help right away if:  · Your symptoms get worse.  · You feel very sleepy.  · You throw up (vomit) or have watery poop (diarrhea) for a long time.  · There are red streaks coming from the infected area.  · Your red area gets larger.  · Your red area turns darker.  This information is not intended to replace advice given to you by your health care provider. Make sure you discuss any questions you have with your health care provider.  Document Released: 06/05/2009 Document Revised: 05/25/2017 Document Reviewed: 10/26/2016  © 2017 [employer]

## 2019-03-10 NOTE — PROGRESS NOTES
Hospital Medicine Daily Progress Note    Date of Service  3/9/2019    Chief Complaint  34 y.o. female admitted 3/6/2019 with large buttock abscesses with spread to left forearm.    Hospital Course    3/7:  This 33 yo female with no DM, no prior h/o abscesses, no IVDA, develop atraumatic abscess to her left buttock over 1 week ago and was treated with bactroban ointment on ER visit 2/27/19 without improvement.  She states that her drainage and size of her left buttock abscess increased and had spread to her right buttock, then to left forearm.  She had BCx 2 ordered on admission, but no wound culture was obtained in the ER, no I&D performed.  I have ordered CT pelvis given the size of the area of the bilateral buttock abscesses.  She was placed on Rocephin and vancomycin IV on admission.  UDS ordered.  3/8:  UDS negative.  CT pelvis, no underlying abscess seen.  Improvement of pain and drainage.  Bedside RN obtained wound culture from left forearm.  Continue vancomycin since appears to be staph species with pustules.  Appearance c/w folliculitis given widespread infection.  dc'd rocephin IV. Patient had complained of dysuria, UA negative.  Tolerating po diet well, dc'd IVFs.  3/9:  Left forearm wound culture with staph aureus ss pending.  To remain on vancomycin IV pending culture results.  Improving daily, less pain, dc'd IV morphine.  Examined left forearm wound with decrease in edema, erythema, no longer with purulent drainage.*        Consultants/Specialty  none    Code Status  full    Disposition  Likely home with family once medically cleared, no needs.    Review of Systems  Review of Systems   Constitutional: Negative for chills, diaphoresis, fever and malaise/fatigue.   HENT: Negative for congestion and sore throat.    Eyes: Negative for pain and discharge.   Respiratory: Negative for cough, hemoptysis, sputum production, shortness of breath and wheezing.    Cardiovascular: Negative for chest pain,  palpitations, claudication and leg swelling.   Gastrointestinal: Negative for abdominal pain, constipation, diarrhea, melena, nausea and vomiting.   Genitourinary: Negative for dysuria, frequency and urgency.   Musculoskeletal: Negative for back pain, joint pain, myalgias and neck pain.   Skin: Positive for rash (multiple abscesses bilateral buttocks and left forearm). Negative for itching.   Neurological: Negative for dizziness, sensory change, speech change, focal weakness, loss of consciousness, weakness and headaches.   Endo/Heme/Allergies: Does not bruise/bleed easily.   Psychiatric/Behavioral: Negative for depression, substance abuse and suicidal ideas.        Physical Exam  Temp:  [36.1 °C (97 °F)-36.6 °C (97.8 °F)] 36.1 °C (97 °F)  Pulse:  [52-57] 57  Resp:  [16-18] 16  BP: ()/(49-63) 118/63  SpO2:  [97 %-98 %] 98 %    Physical Exam   Constitutional: She is oriented to person, place, and time. She appears well-developed and well-nourished. No distress.   HENT:   Head: Normocephalic and atraumatic.   Nose: Nose normal.   Mouth/Throat: Oropharynx is clear and moist. No oropharyngeal exudate.   Eyes: Pupils are equal, round, and reactive to light. Conjunctivae and EOM are normal. Right eye exhibits no discharge. Left eye exhibits no discharge. No scleral icterus.   Neck: Normal range of motion. Neck supple. No JVD present. No tracheal deviation present. No thyromegaly present.   Cardiovascular: Normal rate, regular rhythm, normal heart sounds and intact distal pulses.  Exam reveals no gallop and no friction rub.    No murmur heard.  Pulmonary/Chest: Effort normal and breath sounds normal. No stridor. No respiratory distress. She has no wheezes. She has no rales. She exhibits no tenderness.   Abdominal: Soft. Bowel sounds are normal. She exhibits no distension and no mass. There is no tenderness. There is no rebound and no guarding.   Musculoskeletal: Normal range of motion. She exhibits edema and  tenderness.   improving cellulitis, erythema of skin surrounding abscess bilateral buttocks right worse than left.    Smaller area of abscess left forearm volar surface without surrounding eyrythema, open area, no longer draining purulent discharge.   Lymphadenopathy:     She has no cervical adenopathy.   Neurological: She is alert and oriented to person, place, and time. No cranial nerve deficit. She exhibits normal muscle tone. Coordination normal.   Skin: Skin is warm and dry. No rash noted. She is not diaphoretic. There is erythema.   Psychiatric: She has a normal mood and affect. Her behavior is normal. Judgment and thought content normal.   Nursing note and vitals reviewed.      Fluids    Intake/Output Summary (Last 24 hours) at 03/09/19 1640  Last data filed at 03/08/19 1800   Gross per 24 hour   Intake              240 ml   Output                0 ml   Net              240 ml       Laboratory  Recent Labs      03/07/19   1017  03/08/19   0353  03/09/19   0419   WBC  11.7*  10.1  9.6   RBC  3.84*  3.69*  3.97*   HEMOGLOBIN  10.4*  10.0*  10.7*   HEMATOCRIT  33.6*  32.2*  34.1*   MCV  87.5  87.3  85.9   MCH  27.1  27.1  27.0   MCHC  31.0*  31.1*  31.4*   RDW  42.6  42.2  41.1   PLATELETCT  294  283  325   MPV  9.6  9.5  9.4     Recent Labs      03/07/19   1017  03/08/19   0352  03/09/19   0419   SODIUM  138  139  139   POTASSIUM  3.8  3.6  3.6   CHLORIDE  110  110  108   CO2  21  23  24   GLUCOSE  105*  92  98   BUN  4*  4*  9   CREATININE  0.45*  0.48*  0.58   CALCIUM  7.3*  8.0*  8.4*                   Imaging  CT-PELVIS WITH   Final Result         1.  Buttocks and thigh cellulitis without visualized focal enhancing abscess.   2.  Retroflexed uterus           Assessment/Plan  Abscess and cellulitis of gluteal region- (present on admission)   Assessment & Plan     IV vancomycin, monitor for vancomycin toxicity and follow trough levels  Follow blood and wound cultures  Wound care  Pain control with Tylenol and  oxycodone  3/8:  Improving on IV vancomycin.  Wound culture obtained left forearm abscess.  Appearance of staph species.  Buttock appearance more c/w extensive folliculitis.  CT pelvis negative for deep abscess formation.  3/9:  WC staph aureus ss pending.     Abscess of left forearm- (present on admission)   Assessment & Plan    Management as above     Leukocytosis- (present on admission)   Assessment & Plan    Secondary to above  Patient does not meet sepsis criteria at this time, monitor CBC and vitals  3/8:  Improving wbc on vanco IV.     Hypokalemia- (present on admission)   Assessment & Plan    Replete with K Dur 40  Monitor BMP          VTE prophylaxis: SCDs.

## 2019-03-10 NOTE — PROGRESS NOTES
Changed patient's dressings as ordered. Patient believed that the wound on her left upper arm is much deeper than the last time it was changed. This is the first time the RN saw the wound, so I could not attest for its progress. Notified the wound team of findings. Will continue to monitor.

## 2019-03-10 NOTE — CARE PLAN
Problem: Infection  Goal: Will remain free from infection  Outcome: PROGRESSING AS EXPECTED  Educated patient on hand hygiene and dressing care.

## 2019-03-10 NOTE — DISCHARGE SUMMARY
Discharge Summary    CHIEF COMPLAINT ON ADMISSION  Chief Complaint   Patient presents with   • Abscess       Reason for Admission  Rash     Admission Date  3/6/2019    CODE STATUS  Full Code    HPI & HOSPITAL COURSE  This is a 34 y.o. female here with multiple follicular abscesses to bilateral buttocks and left forearm.    3/7:  This 35 yo female with no DM, no prior h/o abscesses, no IVDA, develop atraumatic abscess to her left buttock over 1 week ago and was treated with bactroban ointment on ER visit 2/27/19 without improvement.  She states that her drainage and size of her left buttock abscess increased and had spread to her right buttock, then to left forearm.  She had BCx 2 ordered on admission, but no wound culture was obtained in the ER, no I&D performed.  I have ordered CT pelvis given the size of the area of the bilateral buttock abscesses.  She was placed on Rocephin and vancomycin IV on admission.  UDS ordered.  3/8:  UDS negative.  CT pelvis, no underlying abscess seen.  Improvement of pain and drainage.  Bedside RN obtained wound culture from left forearm.  Continue vancomycin since appears to be staph species with pustules.  Appearance c/w folliculitis given widespread infection.  dc'd rocephin IV. Patient had complained of dysuria, UA negative.  Tolerating po diet well, dc'd IVFs.  3/9:  Left forearm wound culture with staph aureus ss pending.  To remain on vancomycin IV pending culture results.  Improving daily, less pain, dc'd IV morphine.  Examined left forearm wound with decrease in edema, erythema, no longer with purulent drainage.*       Wound culture from 3/8 left forearm with MRSA ss bactrim.  BCs negative x2 from 3/6.  Patient had been treated with 4 days of IV vancomycin with marked improvement, no longer with purulent drainages, decreased pain and erythema.  She will finish 2 weeks total of antibiotics with 10 days of Bactrim DS.    Wound care clinic appointment made.  She was given  instructions regarding continued daily wound care and supplies until seen by wound care clinic.  hydrofiber silver , sacral mepilex bilaterally, left forearm with hydrofiber silver and adhesive silicone foam.    Therefore, she is discharged in good and stable condition to home with close outpatient follow-up.    The patient met 2-midnight criteria for an inpatient stay at the time of discharge.    Discharge Date  3/10/2019    FOLLOW UP ITEMS POST DISCHARGE  Follow up with your PCP in 1-2 weeks for recheck.    DISCHARGE DIAGNOSES  Active Problems:    Abscess and cellulitis of gluteal region POA: Yes    Abscess of left forearm POA: Yes    Hypokalemia POA: Yes    Leukocytosis POA: Yes    MRSA cellulitis POA: Yes    Iron deficiency anemia POA: Yes    Bacterial folliculitis POA: Yes  Resolved Problems:    * No resolved hospital problems. *      FOLLOW UP  No future appointments.  Farshad Hatch M.D.  [address] [facility] [address]  [phone]    In 1 week  As needed, If symptoms worsen, For wound re-check      MEDICATIONS ON DISCHARGE     Medication List      START taking these medications      Instructions   ascorbic acid 500 MG tablet  Commonly known as:  VITAMIN C   Take 1 Tab by mouth every day.  Dose:  500 mg     ferrous sulfate 325 (65 Fe) MG EC tablet   Take 1 Tab by mouth every day.  Dose:  325 mg     oxyCODONE immediate-release 5 MG Tabs  Commonly known as:  ROXICODONE   Take 1 Tab by mouth every 6 hours as needed for Severe Pain for up to 5 days.  Dose:  5 mg     sulfamethoxazole-trimethoprim 800-160 MG tablet  Commonly known as:  BACTRIM DS   Take 1 Tab by mouth 2 times a day for 10 days.  Dose:  1 Tab            Allergies  Allergies   Allergen Reactions   • Nkda [No Known Drug Allergy]        DIET  Orders Placed This Encounter   Procedures   • Diet Order Regular     Standing Status:   Standing     Number of Occurrences:   1     Order Specific Question:   Diet:     Answer:   Regular [1]        ACTIVITY  As tolerated.  Weight bearing as tolerated    CONSULTATIONS  Wound care    PROCEDURES      1.  Buttocks and thigh cellulitis without visualized focal enhancing abscess.  2.  Retroflexed uterus   Reading Provider Reading Date   Trista Erickson M.D. Mar 7, 2019       3/8 left forearm wound culture:   METHICILLIN RESISTANT STAPHYLOCOCCUS AUREUS     Antibiotic Sensitivity Microscan Unit Status   Ampicillin/sulbactam Resistant 16/8 mcg/mL Final   Clindamycin Resistant >4 mcg/mL Final   Daptomycin Sensitive 1 mcg/mL Final   Erythromycin Resistant >4 mcg/mL Final   Moxifloxacin Sensitive 2 mcg/mL Final   Oxacillin Resistant >2 mcg/mL Final   Penicillin Resistant >8 mcg/mL Final   Tetracycline Sensitive <=4 mcg/mL Final   Trimeth/Sulfa Sensitive <=0.5/9.5 mcg/mL Final   Vancomycin Sensitive 2 mcg/mL Final         LABORATORY  Lab Results   Component Value Date    SODIUM 140 03/10/2019    POTASSIUM 3.6 03/10/2019    CHLORIDE 110 03/10/2019    CO2 23 03/10/2019    GLUCOSE 113 (H) 03/10/2019    BUN 11 03/10/2019    CREATININE 0.59 03/10/2019    CREATININE 0.9 01/14/2008        Lab Results   Component Value Date    WBC 8.7 03/10/2019    HEMOGLOBIN 11.1 (L) 03/10/2019    HEMATOCRIT 36.3 (L) 03/10/2019    PLATELETCT 374 03/10/2019        Total time of the discharge process exceeds 45 minutes.

## 2019-03-10 NOTE — CARE PLAN
Problem: Safety  Goal: Will remain free from injury  Outcome: PROGRESSING AS EXPECTED  Educated patient on fall risk, precautions in place as charted.

## 2019-03-10 NOTE — PROGRESS NOTES
Educated patient, SO, and daughter on how to complete dressing change. Extra supplies sent with patient. Patient referred to the wound clinic and instructed to make appointment.

## 2019-03-11 LAB
BACTERIA BLD CULT: NORMAL
BACTERIA BLD CULT: NORMAL
BACTERIA UR CULT: NORMAL
GLUCOSE BLD-MCNC: 92 MG/DL (ref 65–99)
SIGNIFICANT IND 70042: NORMAL
SITE SITE: NORMAL
SOURCE SOURCE: NORMAL

## 2019-12-05 ENCOUNTER — HOSPITAL ENCOUNTER (EMERGENCY)
Facility: MEDICAL CENTER | Age: 34
End: 2019-12-05
Attending: EMERGENCY MEDICINE
Payer: MEDICAID

## 2019-12-05 VITALS
SYSTOLIC BLOOD PRESSURE: 105 MMHG | TEMPERATURE: 97.3 F | WEIGHT: 147.93 LBS | OXYGEN SATURATION: 99 % | HEART RATE: 72 BPM | BODY MASS INDEX: 23.22 KG/M2 | RESPIRATION RATE: 14 BRPM | DIASTOLIC BLOOD PRESSURE: 65 MMHG | HEIGHT: 67 IN

## 2019-12-05 DIAGNOSIS — N89.8 VAGINAL DISCHARGE: ICD-10-CM

## 2019-12-05 DIAGNOSIS — N39.0 ACUTE UTI: ICD-10-CM

## 2019-12-05 LAB
APPEARANCE UR: ABNORMAL
BACTERIA #/AREA URNS HPF: NEGATIVE /HPF
BILIRUB UR QL STRIP.AUTO: NEGATIVE
C TRACH DNA SPEC QL NAA+PROBE: NEGATIVE
CANDIDA DNA VAG QL PROBE+SIG AMP: NEGATIVE
COLOR UR: YELLOW
EPI CELLS #/AREA URNS HPF: NEGATIVE /HPF
G VAGINALIS DNA VAG QL PROBE+SIG AMP: NEGATIVE
GLUCOSE UR STRIP.AUTO-MCNC: NEGATIVE MG/DL
HCG UR QL: NEGATIVE
HYALINE CASTS #/AREA URNS LPF: ABNORMAL /LPF
KETONES UR STRIP.AUTO-MCNC: NEGATIVE MG/DL
LEUKOCYTE ESTERASE UR QL STRIP.AUTO: ABNORMAL
MICRO URNS: ABNORMAL
N GONORRHOEA DNA SPEC QL NAA+PROBE: NEGATIVE
NITRITE UR QL STRIP.AUTO: NEGATIVE
PH UR STRIP.AUTO: 7 [PH] (ref 5–8)
PROT UR QL STRIP: NEGATIVE MG/DL
RBC # URNS HPF: ABNORMAL /HPF
RBC UR QL AUTO: NEGATIVE
SP GR UR REFRACTOMETRY: 1.01
SP GR UR STRIP.AUTO: 1.02
SPECIMEN SOURCE: NORMAL
T VAGINALIS DNA VAG QL PROBE+SIG AMP: POSITIVE
UROBILINOGEN UR STRIP.AUTO-MCNC: 0.2 MG/DL
WBC #/AREA URNS HPF: ABNORMAL /HPF

## 2019-12-05 PROCEDURE — 87077 CULTURE AEROBIC IDENTIFY: CPT

## 2019-12-05 PROCEDURE — 87491 CHLMYD TRACH DNA AMP PROBE: CPT

## 2019-12-05 PROCEDURE — 87086 URINE CULTURE/COLONY COUNT: CPT

## 2019-12-05 PROCEDURE — 99284 EMERGENCY DEPT VISIT MOD MDM: CPT

## 2019-12-05 PROCEDURE — 87660 TRICHOMONAS VAGIN DIR PROBE: CPT

## 2019-12-05 PROCEDURE — 81001 URINALYSIS AUTO W/SCOPE: CPT

## 2019-12-05 PROCEDURE — 87591 N.GONORRHOEAE DNA AMP PROB: CPT

## 2019-12-05 PROCEDURE — 87480 CANDIDA DNA DIR PROBE: CPT

## 2019-12-05 PROCEDURE — 81025 URINE PREGNANCY TEST: CPT

## 2019-12-05 PROCEDURE — 87510 GARDNER VAG DNA DIR PROBE: CPT

## 2019-12-05 RX ORDER — METRONIDAZOLE 500 MG/1
500 TABLET ORAL 2 TIMES DAILY
Qty: 14 TAB | Refills: 0 | Status: SHIPPED | OUTPATIENT
Start: 2019-12-05 | End: 2019-12-10 | Stop reason: SDUPTHER

## 2019-12-05 RX ORDER — CEPHALEXIN 500 MG/1
500 CAPSULE ORAL 3 TIMES DAILY
Qty: 15 CAP | Refills: 0 | Status: SHIPPED | OUTPATIENT
Start: 2019-12-05 | End: 2019-12-10 | Stop reason: SDUPTHER

## 2019-12-05 ASSESSMENT — LIFESTYLE VARIABLES: DO YOU DRINK ALCOHOL: NO

## 2019-12-05 ASSESSMENT — ENCOUNTER SYMPTOMS: ABDOMINAL PAIN: 1

## 2019-12-05 NOTE — LETTER
12/8/2019               Karin Manzo  [address] [place]  [place] [address]        Dear Karin (MR#[id number])    This letter is sent in regards to your, recent visit to the [facility] Emergency Department on 12/5/2019.  During the visit, tests were performed to assist the physician in a medical diagnosis.  A review of those tests requires that we notify you of the following:    Your urine culture was POSITIVE for a bacteria called Group A Streptococcus. The antibiotic prescribed for you (cephalexin) should be active to treat this bacteria. IT IS IMPORTANT THAT YOU CONTINUE TAKING YOUR ANTIBIOTIC UNTIL IT IS FINISHED.      Please feel free to contact me at the number below if you have any questions or concerns. Thank you for your cooperation in the matter.    Sincerely,  ED Culture Follow-Up Staff  Tiffanie Hernández, PharmD    [facility], Emergency Department  [address] [place] [address] [id number] [phone](ED Culture Line)  [phone]

## 2019-12-05 NOTE — DISCHARGE INSTRUCTIONS
Antibiotics as prescribed.  Return for any new symptoms or concerns.  Follow-up with your doctor per

## 2019-12-05 NOTE — ED PROVIDER NOTES
ED Provider Note    Scribed for Je Hernandez M.D. by Alireza Colby. 12/5/2019, 9:35 AM.    Primary care provider: Farshad Hatch M.D.  Means of arrival: Walk-In  History obtained from: Patient  History limited by: None    CHIEF COMPLAINT  Chief Complaint   Patient presents with   • Foreign Body in Vagina   • Vaginal Discharge   • Abdominal Pain       HPI  Karin Manzo is a 34 y.o. female who presents to the Emergency Department for evaluation of a foreign body inside her vagina, onset one week ago. The patient states that she tried to remove the condom herself without being able to remove it stating it felt stuck. The patient notes that when tried to extract it she felt pain and a little bit of blood and a white substance came out. She notes additional associated abdominal pain. She denies the possibility of being pregnant or having an STD.     REVIEW OF SYSTEMS  Review of Systems   Gastrointestinal: Positive for abdominal pain.   Genitourinary:        FB in vagina, vaginal discharge, mild vaginal bleeding       PAST MEDICAL HISTORY   has a past medical history of Abnormal Pap smear of vagina and vaginal HPV, Alcohol abuse (3/19/2013), Arthritis (8/2014), Cold, H/O hypotension (2008), Headache(784.0), Heart murmur (2002), HPV in female, Iron deficiency anemia (3/10/2019), Kidney disease (2208), Other specified symptom associated with female genital organs, Unspecified urinary incontinence, and Urinary, incontinence, stress female previously scheduled for surgery with Dr Singh but canceled on the day of (3/20/2015).    SURGICAL HISTORY   has a past surgical history that includes mini laparotomy (7/17/2017) and salpingectomy (Left, 7/17/2017).    SOCIAL HISTORY  Social History     Tobacco Use   • Smoking status: Current Every Day Smoker     Packs/day: 0.15     Types: Cigarettes   • Smokeless tobacco: Never Used   Substance Use Topics   • Alcohol use: Yes     Comment: occ   • Drug use: Yes      "Types: Inhaled     Comment: marijuana      Social History     Substance and Sexual Activity   Drug Use Yes   • Types: Inhaled    Comment: marijuana       FAMILY HISTORY  Family History   Problem Relation Age of Onset   • Alcohol/Drug Mother         alcoholic, pt. states mom has kidney failure due to alcohol   • Diabetes Father         pills   • Hypertension Father    • Other Father         anxiety   • Thyroid Maternal Grandmother    • Other Paternal Grandmother         Pt. states grandma has low blood sugars and anxiety   • Diabetes Paternal Grandfather    • Other Paternal Grandfather         emphysema       CURRENT MEDICATIONS  Home Medications     Reviewed by Anjel Burgos R.N. (Registered Nurse) on 12/05/19 at 0850  Med List Status: Complete   Medication Last Dose Status        Patient Yoandy Taking any Medications                       ALLERGIES  Allergies   Allergen Reactions   • Nkda [No Known Drug Allergy]        PHYSICAL EXAM  VITAL SIGNS: /65   Pulse 72   Temp 36.3 °C (97.3 °F) (Oral)   Resp 14   Ht 1.702 m (5' 7\")   Wt 67.1 kg (147 lb 14.9 oz)   LMP 11/01/2019 (Approximate)   SpO2 99%   BMI 23.17 kg/m²   Vitals reviewed.  Constitutional: Well developed, Well nourished, No acute distress, Non-toxic appearance.   HENT: Normocephalic, Atraumatic, Bilateral external ears normal, Oropharynx moist, No oral exudates, Nose normal.   Eyes: PERRL, EOMI, Conjunctiva normal, No discharge.   Neck: Normal range of motion, No tenderness, Supple, No stridor.   Cardiovascular: Normal heart rate, Normal rhythm, No murmurs, No rubs, No gallops.   Thorax & Lungs: Normal breath sounds, No respiratory distress, No wheezing,  Abdomen: Bowel sounds normal, Soft, No tenderness   GYN normal internal/external mucosa.  No cervicitis.  No foreign body identified in the vagina or around the cervix.  Minimal discharge.  No adnexal masses or tenderness.  Skin: Warm, Dry, No erythema, No rash.   Back: No tenderness, No " CVA tenderness.   Musculoskeletal: Good range of motion in all major joints.   Neurologic: Alert, No focal deficits noted.   Psychiatric: Affect normal    LABS  Results for orders placed or performed during the hospital encounter of 12/05/19   CHLAMYDIA & GC BY PCR   Result Value Ref Range    Source Vaginal    URINALYSIS CULTURE, IF INDICATED   Result Value Ref Range    Color Yellow     Character Cloudy (A)     Specific Gravity 1.021 <1.035    Ph 7.0 5.0 - 8.0    Glucose Negative Negative mg/dL    Ketones Negative Negative mg/dL    Protein Negative Negative mg/dL    Bilirubin Negative Negative    Urobilinogen, Urine 0.2 Negative    Nitrite Negative Negative    Leukocyte Esterase Small (A) Negative    Occult Blood Negative Negative    Micro Urine Req Microscopic    HCG QUALITATIVE UR (Lab)   Result Value Ref Range    Beta-Hcg Urine Negative Negative   REFRACTOMETER SG   Result Value Ref Range    Specific Gravity 1.015    VAGINAL PATHOGENS DNA PANEL   Result Value Ref Range    Candida species DNA Probe Negative Negative    Trichamonas vaginalis DNA Probe POSITIVE (A) Negative    Gardnerella vaginalis DNA Probe Negative Negative   URINE MICROSCOPIC (W/UA)   Result Value Ref Range    WBC 10-20 (A) /hpf    RBC 0-2 /hpf    Bacteria Negative None /hpf    Epithelial Cells Negative /hpf    Hyaline Cast 0-2 /lpf       All labs reviewed by me.    COURSE & MEDICAL DECISION MAKING  Pertinent Labs & Imaging studies reviewed. (See chart for details)      9:35 AM Patient seen and examined at bedside. The patient presents with a foreign body inside her vagina. We discussed the plan of care that includes a pelvic exam. Ordered for WET prep, Chlamydia & GC by PCR, POC UA, and POC urine pregnancy to evaluate.     10:09 AM I have performed the patient's pelvic exam, no foreign body visualized.     10:18 AM I have ordered UA culture and HCG qualitative to further evaluate the patient.     11:49 AM The patient's labs have been returned  and reviewed.     11:59 AM Patient was reevaluated at bedside. Discussed lab results with the patient and informed them of the findings. She was informed of her future discharge with outpatient antibiotic prescriptions. With instructions to follow up with her primary care physician.      Patient here for UTI and trichomonas.  She is counseled about practicing safe sex.  She is also counseled to follow-up for other STD testing HIV, and syphilis.  She is counseled that her partners to be checked and treated for STDs.  She is counseled about the diagnosis and return precautions.    She was concerned about a possible foreign body despite a negative exam I repeated the exam of her requested remains unremarkable.  The patient is absolutely no evidence of foreign body.    She verbalized understanding.  Questions answered agreeable to plan and discharged in good condition.   The patient will return for new or worsening symptoms and is stable at the time of discharge.    The patient is referred to a primary physician for blood pressure management, diabetic screening, and for all other preventative health concerns.      DISPOSITION:  Patient will be discharged home in stable condition.    FOLLOW UP:  Farshad Hatch M.D.  [address] [place] [id number]  [phone]    Schedule an appointment as soon as possible for a visit in 2 days        OUTPATIENT MEDICATIONS:  New Prescriptions    CEPHALEXIN (KEFLEX) 500 MG CAP    Take 1 Cap by mouth 3 times a day for 5 days.    METRONIDAZOLE (FLAGYL) 500 MG TAB    Take 1 Tab by mouth 2 Times a Day for 7 days.         FINAL IMPRESSION  1. Vaginal discharge    2. Acute UTI          Alireza REID), am scribing for, and in the presence of, Je Hernandez M.D..    Electronically signed by: Alireza Verde), 12/5/2019    Je REID M.D. personally performed the services described in this documentation, as scribed by Alireza Colby in my presence, and it is  both accurate and complete. E    The note accurately reflects work and decisions made by me.  Je Hernandez  12/5/2019  1:56 PM

## 2019-12-05 NOTE — ED TRIAGE NOTES
33 y/o female ambulatory to triage with c/o a foreign object in her vagina x 1 week. Pt states she believes there is a condom stuck in her vagina, she states that she felt the condom while in the shower but was unable to pull it out. She also states that she has an odorous vaginal discharge and c/o abd pain and bloating that began this morning.

## 2019-12-07 LAB
BACTERIA UR CULT: ABNORMAL
BACTERIA UR CULT: ABNORMAL
SIGNIFICANT IND 70042: ABNORMAL
SITE SITE: ABNORMAL
SOURCE SOURCE: ABNORMAL

## 2019-12-08 NOTE — ED NOTES
"ED Positive Culture Follow-up/Notification Note:    Date: 12/8/19     Patient seen in the ED on 12/5/2019 for suspected foreign object in vagina x 1 wk, odorous vaginal discharge, abd pain, bloating. Normal internal/external mucosa, no cervicitis, no foreign body in vagina or cervix, minimal discharge, no adnexal mass/tenderness on exam. No CVA tenderness. MD aware of positive urine trichomonal result in ED.  1. Vaginal discharge    2. Acute UTI       Discharge Medication List as of 12/5/2019 11:54 AM      START taking these medications    Details   metroNIDAZOLE (FLAGYL) 500 MG Tab Take 1 Tab by mouth 2 Times a Day for 7 days., Disp-14 Tab, R-0, Print Rx Paper      cephALEXin (KEFLEX) 500 MG Cap Take 1 Cap by mouth 3 times a day for 5 days., Disp-15 Cap, R-0, Print Rx Paper             Allergies: Nkda [no known drug allergy]     Vitals:    12/05/19 0834 12/05/19 0845   BP: 105/65    Pulse: 72    Resp: 14    Temp: 36.3 °C (97.3 °F)    TempSrc: Oral    SpO2: 99%    Weight:  67.1 kg (147 lb 14.9 oz)   Height:  1.702 m (5' 7\")       Final cultures:   Results     Procedure Component Value Units Date/Time    URINE CULTURE(NEW) [[id number]]  (Abnormal) Collected:  12/05/19 1015    Order Status:  Completed Specimen:  Urine Updated:  12/07/19 1659     Significant Indicator POS     Source UR     Site -     Culture Result Growth noted after further incubation, see below for  organism identification.  Mixed skin tyson 10-50,000 cfu/mL        Beta Hemolytic Streptococcus group A  <10,000 cfu/mL      CHLAMYDIA & GC BY PCR [[id number]] Collected:  12/05/19 1005    Order Status:  Completed Specimen:  Urine from Genital Updated:  12/05/19 2238     C. trachomatis by PCR Negative     N. gonorrhoeae by PCR Negative     Source Vaginal    URINALYSIS CULTURE, IF INDICATED [[id number]]  (Abnormal) Collected:  12/05/19 1015    Order Status:  Completed Specimen:  Urine Updated:  12/05/19 1149     Color Yellow     Character Cloudy     Specific " Gravity 1.021     Ph 7.0     Glucose Negative mg/dL      Ketones Negative mg/dL      Protein Negative mg/dL      Bilirubin Negative     Urobilinogen, Urine 0.2     Nitrite Negative     Leukocyte Esterase Small     Occult Blood Negative     Micro Urine Req Microscopic    WET PREP [[id number]] Collected:  12/05/19 1005    Order Status:  Canceled Specimen:  Vaginal Fluid           Plan:   Isolated organism susceptible to prescribed therapy. Sent letter informing patient of results and encourage compliance.     Tiffanie Hernández, PharmD

## 2019-12-10 RX ORDER — METRONIDAZOLE 500 MG/1
500 TABLET ORAL 2 TIMES DAILY
Qty: 14 TAB | Refills: 0 | Status: SHIPPED | OUTPATIENT
Start: 2019-12-10 | End: 2019-12-10 | Stop reason: SDUPTHER

## 2019-12-10 RX ORDER — METRONIDAZOLE 500 MG/1
500 TABLET ORAL 2 TIMES DAILY
Qty: 14 TAB | Refills: 0 | Status: SHIPPED | OUTPATIENT
Start: 2019-12-10 | End: 2019-12-17

## 2019-12-10 RX ORDER — CEPHALEXIN 500 MG/1
500 CAPSULE ORAL 3 TIMES DAILY
Qty: 15 CAP | Refills: 0 | Status: SHIPPED | OUTPATIENT
Start: 2019-12-10 | End: 2019-12-10 | Stop reason: SDUPTHER

## 2019-12-10 RX ORDER — CEPHALEXIN 500 MG/1
500 CAPSULE ORAL 3 TIMES DAILY
Qty: 15 CAP | Refills: 0 | Status: SHIPPED | OUTPATIENT
Start: 2019-12-10 | End: 2019-12-15

## 2019-12-10 NOTE — DISCHARGE PLANNING
note:  Received a call from pt who said she lost her prescription. She requested to have her prescription to be sent to [facility] at [facility]. MD sent and also provided CM with paper prescription.     CM called pharmacist at [phone]. [facility] confirmed receiving prescription and pt aware.

## 2020-02-19 ENCOUNTER — APPOINTMENT (OUTPATIENT)
Dept: RADIOLOGY | Facility: MEDICAL CENTER | Age: 35
End: 2020-02-19
Attending: EMERGENCY MEDICINE
Payer: MEDICAID

## 2020-02-19 ENCOUNTER — HOSPITAL ENCOUNTER (EMERGENCY)
Facility: MEDICAL CENTER | Age: 35
End: 2020-02-19
Attending: EMERGENCY MEDICINE
Payer: MEDICAID

## 2020-02-19 VITALS
HEIGHT: 67 IN | SYSTOLIC BLOOD PRESSURE: 105 MMHG | BODY MASS INDEX: 25.81 KG/M2 | WEIGHT: 164.46 LBS | OXYGEN SATURATION: 97 % | RESPIRATION RATE: 18 BRPM | HEART RATE: 81 BPM | DIASTOLIC BLOOD PRESSURE: 57 MMHG | TEMPERATURE: 97.9 F

## 2020-02-19 DIAGNOSIS — Z3A.15 15 WEEKS GESTATION OF PREGNANCY: ICD-10-CM

## 2020-02-19 DIAGNOSIS — N70.11 HYDROSALPINX: ICD-10-CM

## 2020-02-19 DIAGNOSIS — R10.30 LOWER ABDOMINAL PAIN: ICD-10-CM

## 2020-02-19 LAB
ALBUMIN SERPL BCP-MCNC: 3.9 G/DL (ref 3.2–4.9)
ALBUMIN/GLOB SERPL: 1.2 G/DL
ALP SERPL-CCNC: 46 U/L (ref 30–99)
ALT SERPL-CCNC: 14 U/L (ref 2–50)
ANION GAP SERPL CALC-SCNC: 9 MMOL/L (ref 0–11.9)
APPEARANCE UR: CLEAR
AST SERPL-CCNC: 16 U/L (ref 12–45)
B-HCG SERPL-ACNC: ABNORMAL MIU/ML (ref 0–5)
BACTERIA GENITAL QL WET PREP: NORMAL
BASOPHILS # BLD AUTO: 0.1 % (ref 0–1.8)
BASOPHILS # BLD: 0.01 K/UL (ref 0–0.12)
BILIRUB SERPL-MCNC: 0.5 MG/DL (ref 0.1–1.5)
BILIRUB UR QL STRIP.AUTO: NEGATIVE
BUN SERPL-MCNC: 8 MG/DL (ref 8–22)
CALCIUM SERPL-MCNC: 8.5 MG/DL (ref 8.5–10.5)
CHLORIDE SERPL-SCNC: 104 MMOL/L (ref 96–112)
CO2 SERPL-SCNC: 23 MMOL/L (ref 20–33)
COLOR UR: YELLOW
CREAT SERPL-MCNC: 0.58 MG/DL (ref 0.5–1.4)
EOSINOPHIL # BLD AUTO: 0.15 K/UL (ref 0–0.51)
EOSINOPHIL NFR BLD: 1.2 % (ref 0–6.9)
ERYTHROCYTE [DISTWIDTH] IN BLOOD BY AUTOMATED COUNT: 43.8 FL (ref 35.9–50)
GLOBULIN SER CALC-MCNC: 3.3 G/DL (ref 1.9–3.5)
GLUCOSE SERPL-MCNC: 81 MG/DL (ref 65–99)
GLUCOSE UR STRIP.AUTO-MCNC: NEGATIVE MG/DL
HCT VFR BLD AUTO: 35.5 % (ref 37–47)
HGB BLD-MCNC: 12.2 G/DL (ref 12–16)
IMM GRANULOCYTES # BLD AUTO: 0.11 K/UL (ref 0–0.11)
IMM GRANULOCYTES NFR BLD AUTO: 0.9 % (ref 0–0.9)
KETONES UR STRIP.AUTO-MCNC: NEGATIVE MG/DL
LEUKOCYTE ESTERASE UR QL STRIP.AUTO: NEGATIVE
LIPASE SERPL-CCNC: 38 U/L (ref 11–82)
LYMPHOCYTES # BLD AUTO: 2.88 K/UL (ref 1–4.8)
LYMPHOCYTES NFR BLD: 23.6 % (ref 22–41)
MCH RBC QN AUTO: 31 PG (ref 27–33)
MCHC RBC AUTO-ENTMCNC: 34.4 G/DL (ref 33.6–35)
MCV RBC AUTO: 90.1 FL (ref 81.4–97.8)
MICRO URNS: NORMAL
MONOCYTES # BLD AUTO: 1.01 K/UL (ref 0–0.85)
MONOCYTES NFR BLD AUTO: 8.3 % (ref 0–13.4)
NEUTROPHILS # BLD AUTO: 8.04 K/UL (ref 2–7.15)
NEUTROPHILS NFR BLD: 65.9 % (ref 44–72)
NITRITE UR QL STRIP.AUTO: NEGATIVE
NRBC # BLD AUTO: 0.02 K/UL
NRBC BLD-RTO: 0.2 /100 WBC
PH UR STRIP.AUTO: 6.5 [PH] (ref 5–8)
PLATELET # BLD AUTO: 232 K/UL (ref 164–446)
PMV BLD AUTO: 9.3 FL (ref 9–12.9)
POTASSIUM SERPL-SCNC: 3.1 MMOL/L (ref 3.6–5.5)
PROT SERPL-MCNC: 7.2 G/DL (ref 6–8.2)
PROT UR QL STRIP: NEGATIVE MG/DL
RBC # BLD AUTO: 3.94 M/UL (ref 4.2–5.4)
RBC UR QL AUTO: NEGATIVE
SIGNIFICANT IND 70042: NORMAL
SITE SITE: NORMAL
SODIUM SERPL-SCNC: 136 MMOL/L (ref 135–145)
SOURCE SOURCE: NORMAL
SP GR UR STRIP.AUTO: 1
UROBILINOGEN UR STRIP.AUTO-MCNC: 0.2 MG/DL
WBC # BLD AUTO: 12.2 K/UL (ref 4.8–10.8)

## 2020-02-19 PROCEDURE — 700111 HCHG RX REV CODE 636 W/ 250 OVERRIDE (IP): Performed by: EMERGENCY MEDICINE

## 2020-02-19 PROCEDURE — 83690 ASSAY OF LIPASE: CPT

## 2020-02-19 PROCEDURE — 85025 COMPLETE CBC W/AUTO DIFF WBC: CPT

## 2020-02-19 PROCEDURE — A9270 NON-COVERED ITEM OR SERVICE: HCPCS | Performed by: EMERGENCY MEDICINE

## 2020-02-19 PROCEDURE — 700105 HCHG RX REV CODE 258: Performed by: EMERGENCY MEDICINE

## 2020-02-19 PROCEDURE — 700102 HCHG RX REV CODE 250 W/ 637 OVERRIDE(OP): Performed by: EMERGENCY MEDICINE

## 2020-02-19 PROCEDURE — 81003 URINALYSIS AUTO W/O SCOPE: CPT

## 2020-02-19 PROCEDURE — 96365 THER/PROPH/DIAG IV INF INIT: CPT

## 2020-02-19 PROCEDURE — 87491 CHLMYD TRACH DNA AMP PROBE: CPT

## 2020-02-19 PROCEDURE — 87591 N.GONORRHOEAE DNA AMP PROB: CPT

## 2020-02-19 PROCEDURE — 76815 OB US LIMITED FETUS(S): CPT

## 2020-02-19 PROCEDURE — 99285 EMERGENCY DEPT VISIT HI MDM: CPT

## 2020-02-19 PROCEDURE — 84702 CHORIONIC GONADOTROPIN TEST: CPT

## 2020-02-19 PROCEDURE — 80053 COMPREHEN METABOLIC PANEL: CPT

## 2020-02-19 RX ORDER — METOCLOPRAMIDE HYDROCHLORIDE 5 MG/ML
10 INJECTION INTRAMUSCULAR; INTRAVENOUS
Status: DISCONTINUED | OUTPATIENT
Start: 2020-02-19 | End: 2020-02-19

## 2020-02-19 RX ORDER — AZITHROMYCIN 250 MG/1
1000 TABLET, FILM COATED ORAL ONCE
Status: COMPLETED | OUTPATIENT
Start: 2020-02-19 | End: 2020-02-19

## 2020-02-19 RX ORDER — CEFTRIAXONE SODIUM 250 MG/1
250 INJECTION, POWDER, FOR SOLUTION INTRAMUSCULAR; INTRAVENOUS ONCE
Status: DISCONTINUED | OUTPATIENT
Start: 2020-02-19 | End: 2020-02-19

## 2020-02-19 RX ORDER — ACETAMINOPHEN 325 MG/1
650 TABLET ORAL ONCE
Status: COMPLETED | OUTPATIENT
Start: 2020-02-19 | End: 2020-02-19

## 2020-02-19 RX ORDER — AZITHROMYCIN 250 MG/1
500 TABLET, FILM COATED ORAL ONCE
Status: DISCONTINUED | OUTPATIENT
Start: 2020-02-19 | End: 2020-02-19

## 2020-02-19 RX ADMIN — ACETAMINOPHEN 650 MG: 325 TABLET, FILM COATED ORAL at 20:09

## 2020-02-19 RX ADMIN — AZITHROMYCIN MONOHYDRATE 1000 MG: 250 TABLET ORAL at 22:03

## 2020-02-19 RX ADMIN — CEFTRIAXONE SODIUM 250 MG: 10 INJECTION, POWDER, FOR SOLUTION INTRAVENOUS at 22:03

## 2020-02-19 NOTE — ED TRIAGE NOTES
..  Chief Complaint   Patient presents with   • Pregnancy     pt was sent here from her MD for a high risk pregnancy. 8/10 pain in pts low back and lower abd. pt states she was seen by her GYN and the baby may have turners syndrome.    • Diarrhea     x's 1 week. generalized weakness    ..              ..  Vitals:    02/19/20 1327   BP: 119/64   Pulse: (!) 103   Resp: 18   Temp: 36.6 °C (97.9 °F)   SpO2: 100%

## 2020-02-20 LAB
C TRACH DNA SPEC QL NAA+PROBE: NEGATIVE
N GONORRHOEA DNA SPEC QL NAA+PROBE: NEGATIVE
SPECIMEN SOURCE: NORMAL

## 2020-02-20 NOTE — DISCHARGE INSTRUCTIONS
You were seen in the emergency department for abdominal pain.  Your labs are reassuring.  Your ultrasound showed some fluid in your fallopian tubes, and this should be followed up closely with your OB/GYN.  You were treated for possible infection, and a swab was sent to the lab that should result tomorrow if there is any abnormalities.    Please return to the emergency department or seek medical attention if you develop:  Vaginal bleeding, severe abdominal pain, loss of consciousness, any other new or concerning findings

## 2020-02-20 NOTE — ED PROVIDER NOTES
ED Provider Note    Scribed for Ramakrishna Jamison M.D. by Esther Best. 2020,  6:45 PM.    Means of Arrival: Walk-in  History obtained from: Patient  History limited by: None    CHIEF COMPLAINT  Chief Complaint   Patient presents with   • Pregnancy     pt was sent here from her MD for a high risk pregnancy. 8/10 pain in pts low back and lower abd. pt states she was seen by her GYN and the baby may have turners syndrome.    • Diarrhea     x's 1 week. generalized weakness        HPI  Karin Manzo is a 35 y.o. female, who is  and states that she is about fifteen weeks pregnant, who presents to the Emergency Department for acute intermittent episodes of moderate, radiating lower back pain that began last night and has not resolved since onset. The patient reports that she is about fifteen weeks pregnant and receives OB/GYN care from Dr. Brito. During her last visit with Dr. Brito, the patient notes that she had an abnormal pap smear performed. She also had a ultrasound performed that showed the presence of a cyst posterior to the fetal neck. The patient was informed that her findings were possibly consistent with Turnery's Syndrome for the fetus. She notes that her due date is 20. Over the past week and a half, the patient reports that she has been experiencing non-bloody diarrhea and generalized weakness with no alleviation since onset. Last night, the patient states that she developed sudden, mild lower back pain that began to gradually worsen in severity since onset. She states that after the onset of her back pain, the pain began to radiate to her bilateral lower abdomen, around the pelvic area. The patient notes that her pain resolved with duration after a few hours. However today at around 2 PM, the patient began to complain of recurrent symptoms with worsening severity, prompting her to call her OB/GYN. The patient notes that since onset today, both her back and pelvic pain have  not resolved since onset. She describes her lower back pain as dull and aching and her abdominal pain as stiff and cramping in nature. The patient describes her pain as a 8/10 in severity. She additionally notes that she developed one episode of vaginal discharge earlier today upon wiping that is clear in appearance and consistent with the appearance of mucous. The patient denies recent symptoms of painful urination, bloody urination, vaginal bleeding or bloody emesis. She has known allergies to medications.     REVIEW OF SYSTEMS  CONSTITUTIONAL:  No fever.  CARDIOVASCULAR:  No chest discomfort.  RESPIRATORY:  No pleuritic chest pain.  GASTROINTESTINAL:  bilateral lower abdominal pain. No nausea or vomiting.   GENITOURINARY:   non-bloody diarrhea, one episode of vaginal discharge. No dysuria.  MUSCULOSKELETAL:  Back pain. No arthralgia.  NEUROLOGIC:   Generalized weakness. No headache.    See HPI for further details.   All other systems are negative.     PAST MEDICAL HISTORY  Past Medical History:   Diagnosis Date   • Abnormal Pap smear of vagina and vaginal HPV     2008, HAD LEAP PROCEDURE DONE    • Alcohol abuse 3/19/2013   • Arthritis 8/2014   • Cold     nasal congestion, green sputum   • H/O hypotension 2008   • Headache(784.0)     MIGRAINES 2014, STARTED AFTER SEEING CHIROPRACTOR   • Heart murmur 2002    Pt was told had heart murmur with pregnancy 2008   • HPV in female    • Iron deficiency anemia 3/10/2019   • Kidney disease 2208    was told kidneys shutting down when pregnant,  improved after  pregnancy 2008   • Other specified symptom associated with female genital organs    • Unspecified urinary incontinence    • Urinary, incontinence, stress female previously scheduled for surgery with Dr Singh but canceled on the day of 3/20/2015       FAMILY HISTORY  Family History   Problem Relation Age of Onset   • Alcohol/Drug Mother         alcoholic, pt. states mom has kidney failure due to alcohol   • Diabetes  "Father         pills   • Hypertension Father    • Other Father         anxiety   • Thyroid Maternal Grandmother    • Other Paternal Grandmother         Pt. states grandma has low blood sugars and anxiety   • Diabetes Paternal Grandfather    • Other Paternal Grandfather         emphysema       SOCIAL HISTORY   reports that she has been smoking cigarettes. She has been smoking about 0.15 packs per day. She has never used smokeless tobacco. She reports current alcohol use. She reports current drug use. Drug: Inhaled.    SURGICAL HISTORY  Past Surgical History:   Procedure Laterality Date   • MINI LAPAROTOMY  7/17/2017    Procedure: MINI LAPAROTOMY;  Surgeon: Brooklynn Ott M.D.;  Location: SURGERY [facility];  Service:    • SALPINGECTOMY Left 7/17/2017    Procedure: LEFT SALPINGECTOMY;  Surgeon: Brooklynn Ott M.D.;  Location: SURGERY [facility];  Service:        CURRENT MEDICATIONS  Home Medications     Reviewed by Gabrielle Luciano R.N. (Registered Nurse) on 02/19/20 at 1342  Med List Status: Not Addressed   Medication Last Dose Status   Prenatal MV-Min-Fe Fum-FA-DHA (PRENATAL 1 PO)  Active                ALLERGIES  Allergies   Allergen Reactions   • Nkda [No Known Drug Allergy]        PHYSICAL EXAM  VITAL SIGNS: /70   Pulse 77   Temp 36.6 °C (97.9 °F) (Temporal)   Resp 18   Ht 1.702 m (5' 7\")   Wt 74.6 kg (164 lb 7.4 oz)   SpO2 99%   BMI 25.76 kg/m²    Gen: Alert.   HEENT: ATNC  Eyes: PERRL, EOMI, normal conjunctiva.   Neck: trachea midline  Resp: Lungs are clear to ausculation. no respiratory distress  CV: Regular rate and rhythm. No JVD  Abd: Minimal abdominal tenderness without rebound or guarding. Non-distended  Ext: No deformities. No pedal edema.   Back: No CVA tenderness.  Pelvic: Thick white moderate discharge. No bleeding. No adnexal masses or tenderness. No chandelier's sign.  Psych: normal mood  Neuro: speech fluent       DIAGNOSTIC STUDIES / PROCEDURES     LABS  Labs Reviewed   CBC " WITH DIFFERENTIAL - Abnormal; Notable for the following components:       Result Value    WBC 12.2 (*)     RBC 3.94 (*)     Hematocrit 35.5 (*)     Neutrophils (Absolute) 8.04 (*)     Monos (Absolute) 1.01 (*)     All other components within normal limits   COMP METABOLIC PANEL - Abnormal; Notable for the following components:    Potassium 3.1 (*)     All other components within normal limits   HCG QUANTITATIVE - Abnormal; Notable for the following components:    Bhcg >064301.0 (*)     All other components within normal limits   LIPASE   URINALYSIS,CULTURE IF INDICATED   WET PREP   CHLAMYDIA/GC PCR URINE OR SWAB   ESTIMATED GFR     All labs reviewed by me.    RADIOLOGY  US-OB LIMITED WITH TRANSVAGINAL (COMBO)   Final Result         1.  Single intrauterine pregnancy of an estimated gestational age of 15 weeks 2 days with an estimated date of delivery of August 10, 2020.   2.  Anechoic structures in the posterior fetal neck, appearance suggests cystic hygroma or other cystic fetal anomaly. There is reported history of possible Weiner's syndrome.   3.  Tubular structures in the bilateral adnexa, specular material in the right tubular structures is seen. Appearance suggests bilateral hydrosalpinx with possible right pyosalpinx      These findings were discussed with the patient's clinician, Ramakrishna Jamison, on 2/19/2020 8:32 PM.        The radiologist’s interpretation of all radiology studies have been reviewed by me.    COURSE & MEDICAL DECISION MAKING  Pertinent Labs & Imaging studies reviewed. (See chart for details)    6:45 PM Patient seen and examined at bedside. Discussed plan of care with patient which includes ordering lab work and imaging for further evaluation of her condition. She will be medicated for her symptoms and a pelvic exam will be performed. Patient is agreeable to the plan of care. Ordered for UA culture, hCG quantitative serum, lipase, CMP, CBC with differential, estimated GFR and US-OB limited with  transvaginal to evaluate.     7:06 PM Patient will be treated with Tylenol 650 for her pain control. Ordered chlamydia & GC by PCR and wet prep for further evaluation of her condition.     8:44 PM At this time, Dr. Brito (OB/GYN) was paged    8:48 PM I discussed the patient's case and the above findings with Dr. Brito (OB/GYN) who is the patient's OB/GYN. Dr. Brito states that patient's labs, vitals, exam, and imaging are not consistent with PID. Plan of care includes having the patient follow up with Dr. Brito in her office as an outpatient.     8:57 PM Recheck. I updated the patient on my conversation with Dr. Brito. She was informed that she will need to follow up with  in her office as an outpatient. At this time, a pelvic exam was performed with a female RN present in the patient's room as a chaperone, see above physical exam for further details. Patient was medicated with Zithromax 1,000 mg and Rocephin 250 mg in  mL IVPB for possible STI.     9:58 PM Recheck. At this time the patient has stable vital signs and can be discharged. They were given discharge instructions which include getting proper amounts of rest, drinking copious amounts of water, washing their hands frequently to avoid the spread of infection, using Tylenol for pain control and following up with their primary care provider and Dr. Brito.  The patient was given a referral to Dr. Brito and instructed to immediately return to the ED if their symptoms worsen. Patient verbalizes their understanding and agreement to plan of discharge.       Medical Decision Making:  Patient presents with lower abdominal pain in the setting of pregnancy.  She reports a 15-week gestation.  Medical records demonstrate she had a negative hCG in early December.  Her ultrasound is consistent with 15 weeks of gestation.  Beta-hCG is appropriately high.  The remainder of her labs are reassuring.  Pelvic exam not  consistent with PID, however the patient does have possible pyosalpinx versus hydrosalpinx.  This was discussed with the patient's OB/GYN, will follow her closely.  The findings were discussed with the patient, and using shared decision-making, she would prefer treatment with empiric treatment for gonorrhea/chlamydia although PID is an unlikely diagnosis.  Given the diagnostic uncertainty and delays in receiving the gonorrhea/chlamydia testing as well as her personal history of positive sexually transmitted infections, I do not believe that this is an unreasonable course.  Patient is hemodynamically stable.  No evidence of uterine rupture, free fluid of the abdomen, intra-abdominal infection.    The patient will return for new or worsening symptoms and is stable at the time of discharge.    DISPOSITION:  Patient will be discharged home in stable condition.    FOLLOW UP:  Stanislav Brito M.D.  [address] [place] [id number]  [phone]    In 2 days      FINAL IMPRESSION  1. Lower abdominal pain    2. 15 weeks gestation of pregnancy    3. Hydrosalpinx            Esther REID (Lnidsey), am scribing for, and in the presence of, Ramakrishna Jamison M.D..    Electronically signed by: Esther Best (Lindsey), 2/19/2020    IRamakrishna M.D. personally performed the services described in this documentation, as scribed by Esther Best in my presence, and it is both accurate and complete.    C    The note accurately reflects work and decisions made by me.  Ramakrishna Jamison M.D.  2/20/2020  12:26 AM

## 2020-02-20 NOTE — ED NOTES
Discharge teaching and paperwork provided to patient and all questions/concerns answered. VSS, abdominal assessment stable and PIV removed. =. Patient discharged to the care of her  and ambulated out of the ED.

## 2020-02-20 NOTE — ED NOTES
Assumed care of patient. Pt assessed, AAO x 4 . Patient's concerns addressed.  Fall precautions in place.  Pt repositioned and comfortable.  Call light within reach. Will continue to monitor.    Set up pelvic cart for exam.

## 2021-12-23 ENCOUNTER — HOSPITAL ENCOUNTER (EMERGENCY)
Facility: MEDICAL CENTER | Age: 36
End: 2021-12-23
Attending: EMERGENCY MEDICINE
Payer: MEDICAID

## 2021-12-23 VITALS
BODY MASS INDEX: 21.27 KG/M2 | DIASTOLIC BLOOD PRESSURE: 80 MMHG | OXYGEN SATURATION: 96 % | HEART RATE: 113 BPM | SYSTOLIC BLOOD PRESSURE: 119 MMHG | TEMPERATURE: 98 F | WEIGHT: 135.8 LBS | RESPIRATION RATE: 18 BRPM

## 2021-12-23 DIAGNOSIS — B34.9 ACUTE VIRAL SYNDROME: ICD-10-CM

## 2021-12-23 DIAGNOSIS — G56.22 CUBITAL TUNNEL SYNDROME ON LEFT: ICD-10-CM

## 2021-12-23 DIAGNOSIS — Z20.2 STD EXPOSURE: ICD-10-CM

## 2021-12-23 DIAGNOSIS — M71.21 SYNOVIAL CYST OF RIGHT POPLITEAL SPACE: ICD-10-CM

## 2021-12-23 LAB
APPEARANCE UR: CLEAR
BACTERIA #/AREA URNS HPF: ABNORMAL /HPF
BILIRUB UR QL STRIP.AUTO: NEGATIVE
COLOR UR: YELLOW
EPI CELLS #/AREA URNS HPF: ABNORMAL /HPF
FLUAV RNA SPEC QL NAA+PROBE: NEGATIVE
FLUBV RNA SPEC QL NAA+PROBE: NEGATIVE
GLUCOSE UR STRIP.AUTO-MCNC: NEGATIVE MG/DL
HCG UR QL: NEGATIVE
HYALINE CASTS #/AREA URNS LPF: ABNORMAL /LPF
KETONES UR STRIP.AUTO-MCNC: ABNORMAL MG/DL
LEUKOCYTE ESTERASE UR QL STRIP.AUTO: ABNORMAL
MICRO URNS: ABNORMAL
NITRITE UR QL STRIP.AUTO: NEGATIVE
PH UR STRIP.AUTO: 6.5 [PH] (ref 5–8)
PROT UR QL STRIP: NEGATIVE MG/DL
RBC # URNS HPF: ABNORMAL /HPF
RBC UR QL AUTO: ABNORMAL
RSV RNA SPEC QL NAA+PROBE: NEGATIVE
SARS-COV-2 RNA RESP QL NAA+PROBE: NOTDETECTED
SP GR UR STRIP.AUTO: 1.02
SPECIMEN SOURCE: NORMAL
UROBILINOGEN UR STRIP.AUTO-MCNC: 1 MG/DL
WBC #/AREA URNS HPF: ABNORMAL /HPF

## 2021-12-23 PROCEDURE — A9270 NON-COVERED ITEM OR SERVICE: HCPCS | Performed by: EMERGENCY MEDICINE

## 2021-12-23 PROCEDURE — 87591 N.GONORRHOEAE DNA AMP PROB: CPT

## 2021-12-23 PROCEDURE — 87491 CHLMYD TRACH DNA AMP PROBE: CPT

## 2021-12-23 PROCEDURE — 0241U HCHG SARS-COV-2 COVID-19 NFCT DS RESP RNA 4 TRGT MIC: CPT

## 2021-12-23 PROCEDURE — 81001 URINALYSIS AUTO W/SCOPE: CPT

## 2021-12-23 PROCEDURE — 700102 HCHG RX REV CODE 250 W/ 637 OVERRIDE(OP): Performed by: EMERGENCY MEDICINE

## 2021-12-23 PROCEDURE — 700111 HCHG RX REV CODE 636 W/ 250 OVERRIDE (IP): Performed by: EMERGENCY MEDICINE

## 2021-12-23 PROCEDURE — 96372 THER/PROPH/DIAG INJ SC/IM: CPT

## 2021-12-23 PROCEDURE — 81025 URINE PREGNANCY TEST: CPT

## 2021-12-23 PROCEDURE — 99284 EMERGENCY DEPT VISIT MOD MDM: CPT

## 2021-12-23 PROCEDURE — 700101 HCHG RX REV CODE 250: Performed by: EMERGENCY MEDICINE

## 2021-12-23 PROCEDURE — C9803 HOPD COVID-19 SPEC COLLECT: HCPCS | Performed by: EMERGENCY MEDICINE

## 2021-12-23 RX ORDER — IBUPROFEN 600 MG/1
600 TABLET ORAL EVERY 6 HOURS PRN
Qty: 30 TABLET | Refills: 0 | Status: SHIPPED | OUTPATIENT
Start: 2021-12-23 | End: 2021-12-23 | Stop reason: SDUPTHER

## 2021-12-23 RX ORDER — FEXOFENADINE HCL AND PSEUDOEPHEDRINE HCI 60; 120 MG/1; MG/1
1 TABLET, EXTENDED RELEASE ORAL 2 TIMES DAILY
Qty: 20 TABLET | Refills: 0 | Status: SHIPPED | OUTPATIENT
Start: 2021-12-23

## 2021-12-23 RX ORDER — CEFTRIAXONE 500 MG/1
500 INJECTION, POWDER, FOR SOLUTION INTRAMUSCULAR; INTRAVENOUS ONCE
Status: COMPLETED | OUTPATIENT
Start: 2021-12-23 | End: 2021-12-23

## 2021-12-23 RX ORDER — FEXOFENADINE HCL AND PSEUDOEPHEDRINE HCI 60; 120 MG/1; MG/1
1 TABLET, EXTENDED RELEASE ORAL 2 TIMES DAILY
Qty: 20 TABLET | Refills: 0 | Status: SHIPPED | OUTPATIENT
Start: 2021-12-23 | End: 2021-12-23 | Stop reason: SDUPTHER

## 2021-12-23 RX ORDER — IBUPROFEN 600 MG/1
600 TABLET ORAL EVERY 6 HOURS PRN
Qty: 30 TABLET | Refills: 0 | Status: SHIPPED | OUTPATIENT
Start: 2021-12-23

## 2021-12-23 RX ORDER — AZITHROMYCIN 250 MG/1
1000 TABLET, FILM COATED ORAL ONCE
Status: COMPLETED | OUTPATIENT
Start: 2021-12-23 | End: 2021-12-23

## 2021-12-23 RX ADMIN — LIDOCAINE HYDROCHLORIDE 1 ML: 10 INJECTION, SOLUTION INFILTRATION; PERINEURAL at 15:31

## 2021-12-23 RX ADMIN — CEFTRIAXONE SODIUM 500 MG: 500 INJECTION, POWDER, FOR SOLUTION INTRAMUSCULAR; INTRAVENOUS at 15:31

## 2021-12-23 RX ADMIN — AZITHROMYCIN MONOHYDRATE 1000 MG: 250 TABLET ORAL at 15:31

## 2021-12-23 ASSESSMENT — FIBROSIS 4 INDEX: FIB4 SCORE: 0.66

## 2021-12-23 NOTE — ED PROVIDER NOTES
ED Provider Note    ED Provider Note    Primary care provider: Farshad Hatch M.D.  Means of arrival: Walk-in  History obtained from: Patient    CHIEF COMPLAINT  Chief Complaint   Patient presents with   • Exposure to STD   • Flu Like Symptoms     Seen at 3:04 PM.   HPI  Karin Manzo is a 36 y.o. female who presents with her significant other to get STD treatment.  Apparently her significant other has been exposed to gonorrhea chlamydia and he has had symptoms for the past 2 weeks.  She herself denies any vaginal discharge, abdominal pain.    She would like the shot and pills, I explained that the doxycycline would be more effective in preventing resistance and recurrence, they do not feel they can get medications filled today, they would like all of the treatment done in the ER.    In addition, she would like treatment for influenza.  She describes having subjective fevers, chills, intermittent headaches, dry cough, mild amount of diarrhea.  She denies any chest pain, abdominal pain, shortness of breath, vomiting.  She has not been vaccinated against COVID-19.  She does not want treatment for COVID-19, only for influenza, if possible.  The symptoms have been present for the past week.    REVIEW OF SYSTEMS  See HPI,   Remainder of ROS negative.     PAST MEDICAL HISTORY   has a past medical history of Abnormal Pap smear of vagina and vaginal HPV, Alcohol abuse (3/19/2013), Arthritis (8/2014), Cold, H/O hypotension (2008), Headache(784.0), Heart murmur (2002), HPV in female, Iron deficiency anemia (3/10/2019), Kidney disease (2208), Other specified symptom associated with female genital organs, Unspecified urinary incontinence, and Urinary, incontinence, stress female previously scheduled for surgery with Dr Singh but canceled on the day of (3/20/2015).    SURGICAL HISTORY   has a past surgical history that includes mini laparotomy (7/17/2017) and salpingectomy (Left, 7/17/2017).    SOCIAL  HISTORY  Social History     Tobacco Use   • Smoking status: Current Every Day Smoker     Packs/day: 0.15     Types: Cigarettes   • Smokeless tobacco: Never Used   Vaping Use   • Vaping Use: Never used   Substance Use Topics   • Alcohol use: Yes     Comment: occ   • Drug use: Yes     Types: Inhaled     Comment: marijuana      Social History     Substance and Sexual Activity   Drug Use Yes   • Types: Inhaled    Comment: marijuana       FAMILY HISTORY  Family History   Problem Relation Age of Onset   • Alcohol/Drug Mother         alcoholic, pt. states mom has kidney failure due to alcohol   • Diabetes Father         pills   • Hypertension Father    • Other Father         anxiety   • Thyroid Maternal Grandmother    • Other Paternal Grandmother         Pt. states grandma has low blood sugars and anxiety   • Diabetes Paternal Grandfather    • Other Paternal Grandfather         emphysema       CURRENT MEDICATIONS  Reviewed.  See Encounter Summary.     ALLERGIES  Allergies   Allergen Reactions   • Nkda [No Known Drug Allergy]        PHYSICAL EXAM  VITAL SIGNS: /80   Pulse (!) 113   Temp 36.7 °C (98 °F) (Temporal)   Resp 18   Wt 61.6 kg (135 lb 12.9 oz)   SpO2 96%   BMI 21.27 kg/m²   Constitutional: Awake, alert in no apparent distress.  HENT: Normocephalic, Bilateral external ears normal. Nose normal.   Eyes: Conjunctiva normal, non-icteric, EOMI.    Thorax & Lungs: Easy unlabored respirations, Clear to ascultation bilaterally.  Cardiovascular: Regular rate, Regular rhythm, No murmurs, rubs or gallops. Bilateral pulses symmetrical.   Abdomen:  Soft, nontender, nondistended, normal active bowel sounds.   :    Skin: Visualized skin is  Dry, No erythema, No rash.   Musculoskeletal:   No cyanosis, clubbing or edema. No leg asymmetry.   Neurologic: Alert, Grossly non-focal.   Psychiatric: Normal affect, Normal mood  Lymphatic:  No cervical LAD      RADIOLOGY  No orders to display         COURSE & MEDICAL DECISION  MAKING  Pertinent Labs & Imaging studies reviewed. (See chart for details)      3:04 PM - Medical record reviewed, patient seen and examined at bedside.    3:43 PM: Patient receiving discharge instructions, she would now like to bring up that she gets tingling in her fourth, fifth finger and half of her middle finger, this occurs when she is sleeping, it sometimes is associated with shoulder pain and seems to improve when her significant other rubs her shoulder.  I explained this is most consistent with cubital tunnel syndrome, I recommend she change how she sleeps, anti-inflammatories can be of some benefit.    After this, now she explains that she would like to be evaluated for a lump on the back of her leg, she feels that there is a cavity and a lump there that has been bothering her for the past few months.  I explained the patient will need to be in a gown, I will come back and reassess the leg.    4:25 PM: Right popliteal fossa exam with ultrasound.  The popliteal vein is easily compressible, no signs of DVT, artery is patent as well without any thrombus.  Just inferior to the popliteal fossa there is a 1 x 1 x 0.75 fluid collection consistent with a Baker's cyst.    Decision Making:  This is a pleasant 36 y.o. year old female who presents with request for STD treatment as well as influenza treatment.  I explained that influenza treatment is Tamiflu which has to be given in the 48-hour window from initial onset, besides her symptoms today are more concerning for COVID-19 rather than influenza.  She does not wish to have the Regeneron treatment.  We will test for COVID-19 today but it would not change any management, she is well-appearing, hemodynamic stable without any hypoxia.    She will also received empiric treatment for GC today, I encouraged them to get the doxycycline prescription but they do not feel that she will be able to fill it.  Despite this she is asking for medications for nasal decongestion, I  will send a short course of fexofenadine D to the pharmacy.    Other things develop during the ED stay, as please see above.  The patient will be diagnosed with cubital tunnel syndrome as well as a Baker's cyst.  Supportive care recommended.    Discharge Medications:  New Prescriptions    FEXOFENADINE-PSEUDOEPHEDRINE (ALLEGRA-D)  MG PER TABLET    Take 1 Tablet by mouth 2 times a day.    IBUPROFEN (MOTRIN) 600 MG TAB    Take 1 Tablet by mouth every 6 hours as needed.       The patient was discharged home (see d/c instructions) was told to return immediately for any signs or symptoms listed, or any worsening at all.  The patient verbally agreed to the discharge precautions and follow-up plan which is documented in EPIC.        FINAL IMPRESSION  1. STD exposure    2. Acute viral syndrome    3. Synovial cyst of right popliteal space    4. Cubital tunnel syndrome on left

## 2021-12-24 LAB
C TRACH DNA SPEC QL NAA+PROBE: NEGATIVE
N GONORRHOEA DNA SPEC QL NAA+PROBE: POSITIVE
SPECIMEN SOURCE: ABNORMAL

## 2021-12-24 NOTE — ED NOTES
Discharge orders received, instructions and education given, follow-up discussed, prescriptions x2 printed, pt verbalized understanding.

## 2021-12-25 NOTE — ED NOTES
"ED Positive Culture Follow-up/Notification Note:    Date: 12/25/21     Patient seen in the ED on 12/23/2021 for STD treatment. Her significant other recently was exposed to gonorrhea and chlamydia and was symptomatic for two weeks prior.      1. STD exposure    2. Acute viral syndrome    3. Synovial cyst of right popliteal space    4. Cubital tunnel syndrome on left       Discharge Medication List as of 12/23/2021  4:38 PM      START taking these medications    Details   fexofenadine-pseudoephedrine (ALLEGRA-D)  MG per tablet Take 1 Tablet by mouth 2 times a day., Disp-20 Tablet, R-0, Normal      ibuprofen (MOTRIN) 600 MG Tab Take 1 Tablet by mouth every 6 hours as needed., Disp-30 Tablet, R-0, Normal             Allergies: Nkda [no known drug allergy]     Vitals:    12/23/21 1408 12/23/21 1413 12/23/21 1557   BP: 123/80  119/80   Pulse: (!) 56  (!) 113   Resp: 14  18   Temp: 36.6 °C (97.9 °F)  36.7 °C (98 °F)   TempSrc: Temporal  Temporal   SpO2: 96%  96%   Weight:  61.6 kg (135 lb 12.9 oz)        Final cultures:   Results     Procedure Component Value Units Date/Time    Chlamydia/GC PCR Urine Or Swab [[id number]]  (Abnormal) Collected: 12/23/21 1530    Order Status: Completed Specimen: Urine from Genital Updated: 12/24/21 1301     C. trachomatis by PCR Negative     N. gonorrhoeae by PCR POSITIVE     Source Genital    Narrative:      Collected By:    COV-2, FLU A/B, AND RSV BY PCR (2-4 HOURS CEPHEID): Collect NP swab in VTM [[id number]] Collected: 12/23/21 1530    Order Status: Completed Specimen: Respirate Updated: 12/23/21 1631     Influenza virus A RNA Negative     Influenza virus B, PCR Negative     RSV, PCR Negative     SARS-CoV-2 by PCR NotDetected     Comment: PATIENTS: Important information regarding your results and instructions can  be found at [email or web address]   \"After your  Covid-19 Test\"    [facility] providers: PLEASE REFER TO DE-ESCALATION AND RETESTING PROTOCOL  on " [email or web address]    **The [facility] GeneXpert Xpress SARS-CoV-2 RT-PCR Test has been made  available for use under the Emergency Use Authorization (EUA) only.          SARS-CoV-2 Source NP Swab    Narrative:      Collected By:  Have you been in close contact with a person who is suspected  or known to be positive for COVID-19 within the last 30 days  (e.g. last seen that person < 30 days ago)->No    URINALYSIS [[id number]]  (Abnormal) Collected: 12/23/21 1530    Order Status: Completed Specimen: Urine Updated: 12/23/21 1628     Color Yellow     Character Clear     Specific Gravity 1.025     Ph 6.5     Glucose Negative mg/dL      Ketones Trace mg/dL      Protein Negative mg/dL      Bilirubin Negative     Urobilinogen, Urine 1.0     Nitrite Negative     Leukocyte Esterase Small     Occult Blood Trace     Micro Urine Req Microscopic    Narrative:      Collected By:          Plan:   Patient treated for gonorrhea in the ER. No additional treatment necessary. Spoke to the patient via telephone. Counseled the patient to abstain from sexual intercourse 7 days after antibiotic therapy is completed, to notify any sexual partners within the last 60 days to go the the [facility] Department for testing, to seek further HIV/STD testing, and to seek medical attention if symptoms persist. Patient voiced understanding and did not have any other questions.    Chevy Montelongo, VenkataD

## 2021-12-28 ENCOUNTER — APPOINTMENT (OUTPATIENT)
Dept: RADIOLOGY | Facility: MEDICAL CENTER | Age: 36
End: 2021-12-28
Attending: EMERGENCY MEDICINE
Payer: MEDICAID

## 2021-12-28 ENCOUNTER — HOSPITAL ENCOUNTER (EMERGENCY)
Facility: MEDICAL CENTER | Age: 36
End: 2021-12-29
Attending: EMERGENCY MEDICINE
Payer: MEDICAID

## 2021-12-28 VITALS
HEIGHT: 67 IN | TEMPERATURE: 97.1 F | OXYGEN SATURATION: 96 % | RESPIRATION RATE: 15 BRPM | BODY MASS INDEX: 21.45 KG/M2 | SYSTOLIC BLOOD PRESSURE: 117 MMHG | DIASTOLIC BLOOD PRESSURE: 53 MMHG | WEIGHT: 136.69 LBS | HEART RATE: 62 BPM

## 2021-12-28 DIAGNOSIS — M54.12 CERVICAL RADICULOPATHY: ICD-10-CM

## 2021-12-28 LAB — EKG IMPRESSION: NORMAL

## 2021-12-28 PROCEDURE — 72125 CT NECK SPINE W/O DYE: CPT

## 2021-12-28 PROCEDURE — 99283 EMERGENCY DEPT VISIT LOW MDM: CPT

## 2021-12-28 PROCEDURE — 93005 ELECTROCARDIOGRAM TRACING: CPT | Performed by: EMERGENCY MEDICINE

## 2021-12-28 ASSESSMENT — FIBROSIS 4 INDEX: FIB4 SCORE: 0.66

## 2021-12-29 ENCOUNTER — APPOINTMENT (OUTPATIENT)
Dept: RADIOLOGY | Facility: MEDICAL CENTER | Age: 36
End: 2021-12-29
Attending: EMERGENCY MEDICINE
Payer: MEDICAID

## 2021-12-29 PROCEDURE — 93971 EXTREMITY STUDY: CPT | Mod: LT

## 2021-12-29 NOTE — ED TRIAGE NOTES
"Chief Complaint   Patient presents with   • Arm Pain     left bicep pain that increases when pt flexes arm. Pt has had pain for months and her three fingers are going numb.          Pt ambulated to triage for above complaint.   Pt is AO x 4, follows commands, and responds appropriately to questions. Patient's breathing is unlabored and pain is currently 2/10 on the 0-10 pain scale.  Pt placed in lobby. Patient educated on triage process and encouraged to alert staff for any changes.    /74   Pulse (!) 103   Temp 36.1 °C (97 °F) (Temporal)   Resp 16   Ht 1.702 m (5' 7\")   Wt 62 kg (136 lb 11 oz)   SpO2 96%     "

## 2021-12-29 NOTE — ED PROVIDER NOTES
ED Provider Note    CHIEF COMPLAINT  Left arm pain, paresthesias    HPI  Karin Emilynlreesewhitneymike Manzo is a 36 y.o. female who presents to the emergency department for the evaluation of left upper extremity discomfort and tingling in her fingers.  The patient states that her symptoms have been going on for months, specifically greater than 6 months.  She describes the pain as shooting and she states that occasionally she positions her arm in a different way she will have numbness of her middle, ring, and pinky fingers.  She states that she does have a history of arthritis in her neck and occasionally sees a chiropractor but has not recently.  She denies any new or different injuries to the neck or left upper extremity.  She states that she has noticed some asymmetry of the biceps muscle.  She denies any weakness.  She denies any fevers.  She states that she has had some runny nose, nasal congestion, and a cough.  But she was evaluated here a couple of days ago and negative for flu and Covid.  She denies any chest pain or shortness of breath.  She denies any history of blood clots, exogenous hormone use, recent travel or hospitalizations.  She is left-hand dominant.    REVIEW OF SYSTEMS  See HPI for further details. All other systems are negative.     PAST MEDICAL HISTORY   has a past medical history of Abnormal Pap smear of vagina and vaginal HPV, Alcohol abuse (3/19/2013), Arthritis (8/2014), Cold, H/O hypotension (2008), Headache(784.0), Heart murmur (2002), HPV in female, Iron deficiency anemia (3/10/2019), Kidney disease (2208), Other specified symptom associated with female genital organs, Unspecified urinary incontinence, and Urinary, incontinence, stress female previously scheduled for surgery with Dr Singh but canceled on the day of (3/20/2015).    SOCIAL HISTORY  Social History     Tobacco Use   • Smoking status: Current Every Day Smoker     Packs/day: 0.15     Types: Cigarettes   • Smokeless tobacco: Never  "Used   Vaping Use   • Vaping Use: Every day   • Substances: Nicotine   Substance and Sexual Activity   • Alcohol use: Yes     Comment: occ   • Drug use: Yes     Types: Inhaled     Comment: marijuana   • Sexual activity: Yes     Partners: Male     Comment: NUVA RING        SURGICAL HISTORY   has a past surgical history that includes mini laparotomy (7/17/2017) and salpingectomy (Left, 7/17/2017).    CURRENT MEDICATIONS  Home Medications     Reviewed by Chandni Kee R.N. (Registered Nurse) on 12/28/21 at 2146  Med List Status: Partial   Medication Last Dose Status   fexofenadine-pseudoephedrine (ALLEGRA-D)  MG per tablet  Active   ibuprofen (MOTRIN) 600 MG Tab  Active   Prenatal MV-Min-Fe Fum-FA-DHA (PRENATAL 1 PO)  Active              ALLERGIES  Allergies   Allergen Reactions   • Nkda [No Known Drug Allergy]      PHYSICAL EXAM  VITAL SIGNS: /53   Pulse 62   Temp 36.2 °C (97.1 °F) (Temporal)   Resp 15   Ht 1.702 m (5' 7\")   Wt 62 kg (136 lb 11 oz)   LMP 12/17/2021   SpO2 96%   BMI 21.41 kg/m²    Constitutional: Alert in no apparent distress.  HENT: Normocephalic, Atraumatic, Bilateral external ears normal. Nose normal.   Neck: No midline cervical spine tenderness.  The patient has full range of motion of her neck with no restriction.  Heart: Tachycardic rate and regular rythm, no murmurs, gallops or rubs.    Lungs: Clear to auscultation bilaterally. No wheezing, rhonchi, or rales.  Skin: Warm, Dry, No erythema, No rash.   Neurologic: Alert. Patient moves all four extremities and follows commands  Musculoskeletal: Focused exam of the left upper extremity: There is some mild asymmetry of the biceps muscle with what appears to be some mild hypertrophy of the left bicep.  No tenderness palpation or deformities noted over the left upper extremity.  The patient has full range of motion at her shoulder, elbow, and wrist.  She is able to make an A-OK, abduct fingers against resistance, and make a " thumbs up.  Sensation is grossly intact.  2+ radial pulse.    ECG  Results for orders placed or performed during the hospital encounter of 21   EKG (NOW)   Result Value Ref Range    Report       [facility] Emergency Dept.    Test Date:  2021  Pt Name:    SAMUEL ECHAVARRIA                  Department: ER  MRN:        [id number]                      Room:       [facility]  Gender:     Female                       Technician: [id number]  :        1985                   Requested By:CODIE REDMOND  Order #:    [id number]                    Reading MD: Codie Redmond    Measurements  Intervals                                Axis  Rate:       71                           P:          45  IA:         136                          QRS:        49  QRSD:       112                          T:          17  QT:         432  QTc:        470    Interpretive Statements  SINUS RHYTHM  NONSPECIFIC INTRAVENTRICULAR CONDUCTION DELAY  No ST elevation or depression is noted. Axis is normal. No arrhythmias are  noted. Impression: Otherwise normal EKG.  No previous ECG available for comparison  Electronically Signed On 2021 23:03:10 PST by Codie Redmond       RADIOLOGY  US-EXTREMITY VENOUS UPPER UNILAT LEFT         CT-CSPINE WITHOUT PLUS RECONS   Final Result      1.  No acute traumatic injury of the cervical spine.   2.  Osseous neural foraminal narrowing on the left at C6-C7.   3.  Partially visualized maxillary sinus fluid. Correlate for sinusitis.        COURSE & MEDICAL DECISION MAKING  Pertinent Labs & Imaging studies reviewed. (See chart for details)    This is a 36-year-old female presenting to the ED for evaluation of left upper extremity pain. On initial evaluation, the patient appeared well and in no acute distress.  Her vital signs were normal and she no history of fevers.  She denied history of IV drug use. I have low suspicion for epidural abscess.  She did not have any midline cervical spine tenderness and had  full range of motion of her neck.  Her left bicep did appear slightly larger than the right but she is left-hand dominant.  Ultrasound left upper extremity was performed and negative for DVT.  EKG was performed and no evidence of acute ischemia was noted.  I have low clinical suspicion for ACS at this point.  She is otherwise grossly neurovascularly intact in the left upper extremity.  However, she did mention that she had a history of arthritis in her neck.  She has not had imaging of her neck in her system for over 7 years.  A CT was performed and revealed osseous neural foramen narrowing at C6/C7.  I suspect her clinical presentation is most consistent with a cervical radiculopathy.  Given her reassuring neuro exam, the plan was made for outpatient follow-up with spine surgery.  The incidental finding of some maxillary sinus fluid was noted and I suspect she likely had a viral illness as her symptoms were improving.  I do not think that she requires antibiotics at this point.  She was given the spine doctor on call information and referral was placed.  She was instructed to use anti-inflammatories as needed for symptomatic relief at home.  She understands to follow-up and will return to the ED with any worsening signs or symptoms.    I verified that the patient was wearing a mask and I was wearing appropriate PPE every time I entered the room. The patient's mask was on the patient at all times during my encounter except for a brief view of the oropharynx.    FINAL IMPRESSION  1. Cervical radiculopathy      PRESCRIPTIONS  New Prescriptions    No medications on file     FOLLOW UP  Singh Peres M.D.  [address]  [facility] [address]  [phone]    Call in 1 day  To schedule a follow up appointment    [facility], Emergency Dept  [address] [facility] [address]  [phone]  Go to       -DISCHARGE-  Electronically signed by: Codie Parrish D.O., 12/28/2021 10:50 PM         Picato Counseling:  I discussed with the patient the risks of Picato including but not limited to erythema, scaling, itching, weeping, crusting, and pain.

## 2022-09-20 NOTE — ED NOTES
Admitting hospitalist at bedside   
ERP at bedside.   
IV abx infusing.   
Lab at bedside for draw.   
Lab called for BCX2 draw.   
Med rec updated and complete.  Allergies reviewed.  Pt denies taking medications.  
Pt medicated per mar for pain.   
Pt resting in room waiting for admission. Even and unlabored respirations noted.   
Report received from day RN. Blood drawn and sent per day RN prior to this RN shift beginning.   
Report to Viviana LARRY.   
Report to receiving RN for T304-2. Questions answered.   
Transport to bedside. Pt taken off floor. All pt care responsibilities handed off.  
No

## 2023-12-01 ENCOUNTER — APPOINTMENT (OUTPATIENT)
Dept: RADIOLOGY | Facility: MEDICAL CENTER | Age: 38
End: 2023-12-01
Attending: STUDENT IN AN ORGANIZED HEALTH CARE EDUCATION/TRAINING PROGRAM
Payer: MEDICAID

## 2023-12-01 ENCOUNTER — HOSPITAL ENCOUNTER (EMERGENCY)
Facility: MEDICAL CENTER | Age: 38
End: 2023-12-02
Attending: STUDENT IN AN ORGANIZED HEALTH CARE EDUCATION/TRAINING PROGRAM
Payer: MEDICAID

## 2023-12-01 DIAGNOSIS — R10.33 PERIUMBILICAL ABDOMINAL PAIN: ICD-10-CM

## 2023-12-01 LAB
ALBUMIN SERPL BCP-MCNC: 4.5 G/DL (ref 3.2–4.9)
ALBUMIN/GLOB SERPL: 1.4 G/DL
ALP SERPL-CCNC: 60 U/L (ref 30–99)
ALT SERPL-CCNC: 18 U/L (ref 2–50)
ANION GAP SERPL CALC-SCNC: 10 MMOL/L (ref 7–16)
APPEARANCE UR: CLEAR
AST SERPL-CCNC: 18 U/L (ref 12–45)
BACTERIA #/AREA URNS HPF: NEGATIVE /HPF
BASOPHILS # BLD AUTO: 0.3 % (ref 0–1.8)
BASOPHILS # BLD: 0.02 K/UL (ref 0–0.12)
BILIRUB SERPL-MCNC: 0.6 MG/DL (ref 0.1–1.5)
BILIRUB UR QL STRIP.AUTO: NEGATIVE
BUN SERPL-MCNC: 15 MG/DL (ref 8–22)
CALCIUM ALBUM COR SERPL-MCNC: 8.7 MG/DL (ref 8.5–10.5)
CALCIUM SERPL-MCNC: 9.1 MG/DL (ref 8.5–10.5)
CHLORIDE SERPL-SCNC: 103 MMOL/L (ref 96–112)
CO2 SERPL-SCNC: 23 MMOL/L (ref 20–33)
COLOR UR: YELLOW
CREAT SERPL-MCNC: 0.73 MG/DL (ref 0.5–1.4)
EKG IMPRESSION: NORMAL
EOSINOPHIL # BLD AUTO: 0.18 K/UL (ref 0–0.51)
EOSINOPHIL NFR BLD: 2.3 % (ref 0–6.9)
EPI CELLS #/AREA URNS HPF: NEGATIVE /HPF
ERYTHROCYTE [DISTWIDTH] IN BLOOD BY AUTOMATED COUNT: 41.4 FL (ref 35.9–50)
GFR SERPLBLD CREATININE-BSD FMLA CKD-EPI: 107 ML/MIN/1.73 M 2
GLOBULIN SER CALC-MCNC: 3.2 G/DL (ref 1.9–3.5)
GLUCOSE SERPL-MCNC: 85 MG/DL (ref 65–99)
GLUCOSE UR STRIP.AUTO-MCNC: NEGATIVE MG/DL
HCG SERPL QL: NEGATIVE
HCT VFR BLD AUTO: 42.8 % (ref 37–47)
HGB BLD-MCNC: 14.5 G/DL (ref 12–16)
HYALINE CASTS #/AREA URNS LPF: ABNORMAL /LPF
IMM GRANULOCYTES # BLD AUTO: 0.01 K/UL (ref 0–0.11)
IMM GRANULOCYTES NFR BLD AUTO: 0.1 % (ref 0–0.9)
KETONES UR STRIP.AUTO-MCNC: NEGATIVE MG/DL
LEUKOCYTE ESTERASE UR QL STRIP.AUTO: NEGATIVE
LIPASE SERPL-CCNC: 44 U/L (ref 11–82)
LYMPHOCYTES # BLD AUTO: 3.13 K/UL (ref 1–4.8)
LYMPHOCYTES NFR BLD: 40.7 % (ref 22–41)
MCH RBC QN AUTO: 30 PG (ref 27–33)
MCHC RBC AUTO-ENTMCNC: 33.9 G/DL (ref 32.2–35.5)
MCV RBC AUTO: 88.6 FL (ref 81.4–97.8)
MICRO URNS: ABNORMAL
MONOCYTES # BLD AUTO: 0.68 K/UL (ref 0–0.85)
MONOCYTES NFR BLD AUTO: 8.8 % (ref 0–13.4)
NEUTROPHILS # BLD AUTO: 3.67 K/UL (ref 1.82–7.42)
NEUTROPHILS NFR BLD: 47.8 % (ref 44–72)
NITRITE UR QL STRIP.AUTO: NEGATIVE
NRBC # BLD AUTO: 0 K/UL
NRBC BLD-RTO: 0 /100 WBC (ref 0–0.2)
PH UR STRIP.AUTO: 6 [PH] (ref 5–8)
PLATELET # BLD AUTO: 262 K/UL (ref 164–446)
PMV BLD AUTO: 9.6 FL (ref 9–12.9)
POTASSIUM SERPL-SCNC: 3.6 MMOL/L (ref 3.6–5.5)
PROT SERPL-MCNC: 7.7 G/DL (ref 6–8.2)
PROT UR QL STRIP: NEGATIVE MG/DL
RBC # BLD AUTO: 4.83 M/UL (ref 4.2–5.4)
RBC # URNS HPF: ABNORMAL /HPF
RBC UR QL AUTO: ABNORMAL
SODIUM SERPL-SCNC: 136 MMOL/L (ref 135–145)
SP GR UR STRIP.AUTO: 1.01
UROBILINOGEN UR STRIP.AUTO-MCNC: 0.2 MG/DL
WBC # BLD AUTO: 7.7 K/UL (ref 4.8–10.8)
WBC #/AREA URNS HPF: ABNORMAL /HPF

## 2023-12-01 PROCEDURE — 74177 CT ABD & PELVIS W/CONTRAST: CPT

## 2023-12-01 PROCEDURE — 83690 ASSAY OF LIPASE: CPT

## 2023-12-01 PROCEDURE — 96374 THER/PROPH/DIAG INJ IV PUSH: CPT | Mod: XU

## 2023-12-01 PROCEDURE — 81001 URINALYSIS AUTO W/SCOPE: CPT

## 2023-12-01 PROCEDURE — 96375 TX/PRO/DX INJ NEW DRUG ADDON: CPT

## 2023-12-01 PROCEDURE — 36415 COLL VENOUS BLD VENIPUNCTURE: CPT

## 2023-12-01 PROCEDURE — 700111 HCHG RX REV CODE 636 W/ 250 OVERRIDE (IP): Mod: JZ,UD | Performed by: STUDENT IN AN ORGANIZED HEALTH CARE EDUCATION/TRAINING PROGRAM

## 2023-12-01 PROCEDURE — A9270 NON-COVERED ITEM OR SERVICE: HCPCS | Mod: UD | Performed by: STUDENT IN AN ORGANIZED HEALTH CARE EDUCATION/TRAINING PROGRAM

## 2023-12-01 PROCEDURE — 99284 EMERGENCY DEPT VISIT MOD MDM: CPT

## 2023-12-01 PROCEDURE — 80053 COMPREHEN METABOLIC PANEL: CPT

## 2023-12-01 PROCEDURE — 700102 HCHG RX REV CODE 250 W/ 637 OVERRIDE(OP): Mod: UD | Performed by: STUDENT IN AN ORGANIZED HEALTH CARE EDUCATION/TRAINING PROGRAM

## 2023-12-01 PROCEDURE — 85025 COMPLETE CBC W/AUTO DIFF WBC: CPT

## 2023-12-01 PROCEDURE — 700117 HCHG RX CONTRAST REV CODE 255: Mod: UD | Performed by: STUDENT IN AN ORGANIZED HEALTH CARE EDUCATION/TRAINING PROGRAM

## 2023-12-01 PROCEDURE — 93005 ELECTROCARDIOGRAM TRACING: CPT | Performed by: STUDENT IN AN ORGANIZED HEALTH CARE EDUCATION/TRAINING PROGRAM

## 2023-12-01 PROCEDURE — 84703 CHORIONIC GONADOTROPIN ASSAY: CPT

## 2023-12-01 RX ORDER — ONDANSETRON 2 MG/ML
4 INJECTION INTRAMUSCULAR; INTRAVENOUS ONCE
Status: COMPLETED | OUTPATIENT
Start: 2023-12-01 | End: 2023-12-01

## 2023-12-01 RX ORDER — ALUMINA, MAGNESIA, AND SIMETHICONE 2400; 2400; 240 MG/30ML; MG/30ML; MG/30ML
10 SUSPENSION ORAL ONCE
Status: COMPLETED | OUTPATIENT
Start: 2023-12-01 | End: 2023-12-01

## 2023-12-01 RX ORDER — ONDANSETRON 4 MG/1
4 TABLET, ORALLY DISINTEGRATING ORAL EVERY 6 HOURS PRN
Qty: 10 TABLET | Refills: 0 | Status: SHIPPED | OUTPATIENT
Start: 2023-12-01

## 2023-12-01 RX ORDER — OMEPRAZOLE 40 MG/1
40 CAPSULE, DELAYED RELEASE ORAL DAILY
Qty: 30 CAPSULE | Refills: 0 | Status: SHIPPED | OUTPATIENT
Start: 2023-12-01

## 2023-12-01 RX ORDER — ALUMINA, MAGNESIA, AND SIMETHICONE 2400; 2400; 240 MG/30ML; MG/30ML; MG/30ML
10 SUSPENSION ORAL 4 TIMES DAILY PRN
Qty: 560 ML | Refills: 0 | Status: SHIPPED | OUTPATIENT
Start: 2023-12-01

## 2023-12-01 RX ADMIN — FAMOTIDINE 20 MG: 10 INJECTION, SOLUTION INTRAVENOUS at 22:28

## 2023-12-01 RX ADMIN — IOHEXOL 100 ML: 350 INJECTION, SOLUTION INTRAVENOUS at 22:57

## 2023-12-01 RX ADMIN — ALUMINUM HYDROXIDE, MAGNESIUM HYDROXIDE, AND DIMETHICONE 10 ML: 400; 400; 40 SUSPENSION ORAL at 22:29

## 2023-12-01 RX ADMIN — ONDANSETRON 4 MG: 2 INJECTION INTRAMUSCULAR; INTRAVENOUS at 22:32

## 2023-12-02 VITALS
RESPIRATION RATE: 15 BRPM | DIASTOLIC BLOOD PRESSURE: 68 MMHG | HEIGHT: 67 IN | HEART RATE: 63 BPM | TEMPERATURE: 97 F | BODY MASS INDEX: 30.76 KG/M2 | WEIGHT: 195.99 LBS | OXYGEN SATURATION: 95 % | SYSTOLIC BLOOD PRESSURE: 103 MMHG

## 2023-12-02 NOTE — ED NOTES
Patient discharged home per ERP.  Discharge teaching and education discussed with patient. POC discussed.   Patient verbalized understanding of discharge teaching and education. No other questions at this time.     RX x 3 given to patient.   PIV removed.     VSS. Patient alert and oriented. Patient arranged ride for self. Able to ambulate off unit safely with steady gait.

## 2023-12-02 NOTE — ED PROVIDER NOTES
"ED Provider Note    CHIEF COMPLAINT  Chief Complaint   Patient presents with    Abdominal Pain     Mid abd pain x 3 days. Patient reports seen at [facility] 2 days ago told to come to ER if pain gets worst.        EXTERNAL RECORDS REVIEWED  Outpatient Notes outpatient visit 11/29/2023 for abdominal pain.  Patient was diagnosed with superficial injury of abdominal wall at that time but advised to present to ER if symptoms worsen.    HPI/ROS  LIMITATION TO HISTORY   Select: : None  OUTSIDE HISTORIAN(S):  Friend      Karin Manzo is a 38 y.o. female who presents with periumbilical abdominal pain for the past 3 days.  Patient today notes nausea without emesis.  She has had no fevers, no flank pain, no dysuria or hematuria, no diarrhea.  She has not had pain like this before.  She notes today it is more pronounced and is currently an 8 out of 10 does not radiate.  She is not having abdominal distention.  No medications taken for her pain.  She relates that she was told a long time ago she had some \"mass\" near and ovary and she never had it followed up.    PAST MEDICAL HISTORY   has a past medical history of Abnormal Pap smear of vagina and vaginal HPV, Alcohol abuse (3/19/2013), Arthritis (8/2014), Cold, H/O hypotension (2008), Headache(784.0), Heart murmur (2002), HPV in female, Iron deficiency anemia (3/10/2019), Kidney disease (2208), Other specified symptom associated with female genital organs, Unspecified urinary incontinence, and Urinary, incontinence, stress female previously scheduled for surgery with Dr Singh but canceled on the day of (3/20/2015).    SURGICAL HISTORY   has a past surgical history that includes mini laparotomy (7/17/2017) and salpingectomy (Left, 7/17/2017).    FAMILY HISTORY  Family History   Problem Relation Age of Onset    Alcohol/Drug Mother         alcoholic, pt. states mom has kidney failure due to alcohol    Diabetes Father         pills    Hypertension Father     Other " "Father         anxiety    Thyroid Maternal Grandmother     Other Paternal Grandmother         Pt. states grandma has low blood sugars and anxiety    Diabetes Paternal Grandfather     Other Paternal Grandfather         emphysema       SOCIAL HISTORY  Social History     Tobacco Use    Smoking status: Some Days     Current packs/day: 0.15     Types: Cigarettes    Smokeless tobacco: Never   Vaping Use    Vaping Use: Every day    Substances: Nicotine, Flavoring   Substance and Sexual Activity    Alcohol use: Not Currently     Comment: occ    Drug use: Not Currently     Types: Inhaled     Comment: marijuana    Sexual activity: Yes     Partners: Male     Comment: NUVA RING        CURRENT MEDICATIONS  Home Medications       Reviewed by Rajani Vásquez R.N. (Registered Nurse) on 12/01/23 at 2000  Med List Status: Not Addressed     Medication Last Dose Status   fexofenadine-pseudoephedrine (ALLEGRA-D)  MG per tablet  Active   ibuprofen (MOTRIN) 600 MG Tab  Active   Prenatal MV-Min-Fe Fum-FA-DHA (PRENATAL 1 PO)  Active                    ALLERGIES  Allergies   Allergen Reactions    Nkda [No Known Drug Allergy]        PHYSICAL EXAM  VITAL SIGNS: /68   Pulse 63   Temp 36.1 °C (97 °F) (Temporal)   Resp 15   Ht 1.702 m (5' 7\")   Wt 88.9 kg (195 lb 15.8 oz)   LMP 11/10/2023   SpO2 95%   BMI 30.70 kg/m²    Constitutional: Awake and alert. No acute distress.  Head: NCAT.  HEENT: Normal Conjunctiva. PERRLA.  Neck: Grossly normal range of motion. Airway midline.  Cardiovascular: Normal heart rate, Normal rhythm.  Thorax & Lungs: No respiratory distress. Clear to Auscultation bilaterally.  Abdomen: Normal inspection. Nontender. Nondistended  Skin: No obvious rash.  Back: No tenderness, No CVA tenderness.   Musculoskeletal: No obvious deformity. Moves all extremities Well.  Neurologic: A&Ox3.   Psychiatric: Mood and affect are appropriate for situation.    DIAGNOSTIC STUDIES / PROCEDURES  EKG  I have independently " interpreted this EKG  Normal sinus rhythm 60 bpm.  Normal intervals.  No acute ST or T wave changes.    LABS  Results for orders placed or performed during the hospital encounter of 12/01/23   CBC WITH DIFFERENTIAL   Result Value Ref Range    WBC 7.7 4.8 - 10.8 K/uL    RBC 4.83 4.20 - 5.40 M/uL    Hemoglobin 14.5 12.0 - 16.0 g/dL    Hematocrit 42.8 37.0 - 47.0 %    MCV 88.6 81.4 - 97.8 fL    MCH 30.0 27.0 - 33.0 pg    MCHC 33.9 32.2 - 35.5 g/dL    RDW 41.4 35.9 - 50.0 fL    Platelet Count 262 164 - 446 K/uL    MPV 9.6 9.0 - 12.9 fL    Neutrophils-Polys 47.80 44.00 - 72.00 %    Lymphocytes 40.70 22.00 - 41.00 %    Monocytes 8.80 0.00 - 13.40 %    Eosinophils 2.30 0.00 - 6.90 %    Basophils 0.30 0.00 - 1.80 %    Immature Granulocytes 0.10 0.00 - 0.90 %    Nucleated RBC 0.00 0.00 - 0.20 /100 WBC    Neutrophils (Absolute) 3.67 1.82 - 7.42 K/uL    Lymphs (Absolute) 3.13 1.00 - 4.80 K/uL    Monos (Absolute) 0.68 0.00 - 0.85 K/uL    Eos (Absolute) 0.18 0.00 - 0.51 K/uL    Baso (Absolute) 0.02 0.00 - 0.12 K/uL    Immature Granulocytes (abs) 0.01 0.00 - 0.11 K/uL    NRBC (Absolute) 0.00 K/uL   COMP METABOLIC PANEL   Result Value Ref Range    Sodium 136 135 - 145 mmol/L    Potassium 3.6 3.6 - 5.5 mmol/L    Chloride 103 96 - 112 mmol/L    Co2 23 20 - 33 mmol/L    Anion Gap 10.0 7.0 - 16.0    Glucose 85 65 - 99 mg/dL    Bun 15 8 - 22 mg/dL    Creatinine 0.73 0.50 - 1.40 mg/dL    Calcium 9.1 8.5 - 10.5 mg/dL    Correct Calcium 8.7 8.5 - 10.5 mg/dL    AST(SGOT) 18 12 - 45 U/L    ALT(SGPT) 18 2 - 50 U/L    Alkaline Phosphatase 60 30 - 99 U/L    Total Bilirubin 0.6 0.1 - 1.5 mg/dL    Albumin 4.5 3.2 - 4.9 g/dL    Total Protein 7.7 6.0 - 8.2 g/dL    Globulin 3.2 1.9 - 3.5 g/dL    A-G Ratio 1.4 g/dL   LIPASE   Result Value Ref Range    Lipase 44 11 - 82 U/L   HCG QUAL SERUM   Result Value Ref Range    Beta-Hcg Qualitative Serum Negative Negative   URINALYSIS,CULTURE IF INDICATED    Specimen: Urine, Clean Catch   Result Value Ref  Range    Color Yellow     Character Clear     Specific Gravity 1.013 <1.035    Ph 6.0 5.0 - 8.0    Glucose Negative Negative mg/dL    Ketones Negative Negative mg/dL    Protein Negative Negative mg/dL    Bilirubin Negative Negative    Urobilinogen, Urine 0.2 Negative    Nitrite Negative Negative    Leukocyte Esterase Negative Negative    Occult Blood Trace (A) Negative    Micro Urine Req Microscopic    URINE MICROSCOPIC (W/UA)   Result Value Ref Range    WBC 2-5 /hpf    RBC 2-5 (A) /hpf    Bacteria Negative None /hpf    Epithelial Cells Negative /hpf    Hyaline Cast 0-2 /lpf   ESTIMATED GFR   Result Value Ref Range    GFR (CKD-EPI) 107 >60 mL/min/1.73 m 2   EKG   Result Value Ref Range    Report       [facility] Emergency Dept.    Test Date:  2023  Pt Name:    SAMUEL ECHAVARRIA                  Department: ER  MRN:        [id number]                      Room:       [facility]  Gender:     Female                       Technician: [id number]  :        1985                   Requested By:EFREN RADER  Order #:    [id number]                    Reading MD:    Measurements  Intervals                                Axis  Rate:       62                           P:          2  VA:         119                          QRS:        39  QRSD:       121                          T:          -2  QT:         417  QTc:        424    Interpretive Statements  Sinus rhythm  Borderline short VA interval  Nonspecific intraventricular conduction delay  Compared to ECG 2021 22:54:34  ST (T wave) deviation no longer present           RADIOLOGY  I have independently interpreted the diagnostic imaging associated with this visit and am waiting the final reading from the radiologist.   My preliminary interpretation is as follows: No free air under the diaphragm, no obstruction  Radiologist interpretation:   CT-ABDOMEN-PELVIS WITH   Final Result      No evidence of acute inflammatory process in the abdomen or pelvis.             COURSE & MEDICAL DECISION MAKING    ED Observation Status? Yes; I am placing the patient in to an observation status due to a diagnostic uncertainty as well as therapeutic intensity. Patient placed in observation status at 20:02 PM, 12/1/2023.     Observation plan is as follows: Will treat patient's pain with IV pain medications.  Will assess with CT imaging for insidious process.    Upon Reevaluation, the patient's condition has: Improved; and will be discharged.    Patient discharged from ED Observation status at 23:51 (Time) 12/1/2023 (Date).     INITIAL ASSESSMENT, COURSE AND PLAN  Care Narrative:   30-year-old female history of former meth use and sobriety here with periumbilical pain for 3 days and nausea today without vomiting, no fevers, no urinary or GI associated symptoms.  Afebrile and reassuring vitals  On exam she has soft nontender abdomen, no McBurney point, no Grimes sign, no Rovsing.  No rebound or guarding and no flank tenderness.  Differential includes abdominal hematoma, strain, appendicitis, bowel obstruction, PID, malignancy  Will treat with IV pain medication while awaiting labs.  Labs with no leukocytosis, no left shift, CMP is unremarkable, urine without acute findings  CT abdomen pelvis was ordered after long discussion with patient regarding workup to this point.  CT does not reveal acute process.  Patient is p.o. tolerant, pain is well-controlled.  Updated on reassuring workup including labs and CT.  Discharged and advised to follow-up with her PCP.      DO dany Luna spent greater than 3 minutes with the patient explaining the importance of smoking cessation.       ADDITIONAL PROBLEM LIST  None  DISPOSITION AND DISCUSSIONS  I have discussed management of the patient with the following physicians and MILLICENT's:  None    Discussion of management with other QHP or appropriate source(s): None     Escalation of care considered, and ultimately not performed:blood analysis,  diagnostic imaging, and acute inpatient care management, however at this time, the patient is most appropriate for outpatient management    Barriers to care at this time, including but not limited to: Patient lacks financial resources.     Decision tools and prescription drugs considered including, but not limited to: Antibiotics No evidence of acute bacterial infection .    FINAL DIAGNOSIS  1. Periumbilical abdominal pain           Electronically signed by: Belén Verdugo D.O., 12/1/2023 9:57 PM

## 2023-12-02 NOTE — ED NOTES
Patient resting on stretcher in no acute distress. Equal chest rise noted. VS stable on monitor. Bed locked and in low position. Call bell within reach

## 2023-12-02 NOTE — ED TRIAGE NOTES
Chief Complaint   Patient presents with    Abdominal Pain     Mid abd pain x 3 days. Patient reports seen at [facility] 2 days ago told to come to ER if pain gets worst.

## 2023-12-02 NOTE — ED NOTES
Pt roomed in [facility]. Patient oriented to room, and this RN explained ER Process. Patient oriented to call light and verbalizes understanding of use.     Triage note reviewed and agreed with at this time. Chart up for ERP.

## 2023-12-02 NOTE — DISCHARGE INSTRUCTIONS
Please take medications as prescribed.  Your CT and workup today are very reassuring.  There is no acute or concerning findings at this time.  Please follow-up with your regular doctor if symptoms are persisting despite medications.

## 2024-10-23 ENCOUNTER — APPOINTMENT (OUTPATIENT)
Dept: RADIOLOGY | Facility: MEDICAL CENTER | Age: 39
End: 2024-10-23
Attending: STUDENT IN AN ORGANIZED HEALTH CARE EDUCATION/TRAINING PROGRAM
Payer: MEDICAID

## 2024-10-23 ENCOUNTER — HOSPITAL ENCOUNTER (EMERGENCY)
Facility: MEDICAL CENTER | Age: 39
End: 2024-10-23
Attending: STUDENT IN AN ORGANIZED HEALTH CARE EDUCATION/TRAINING PROGRAM
Payer: MEDICAID

## 2024-10-23 VITALS
TEMPERATURE: 97.7 F | SYSTOLIC BLOOD PRESSURE: 109 MMHG | WEIGHT: 191 LBS | BODY MASS INDEX: 29.98 KG/M2 | RESPIRATION RATE: 16 BRPM | HEIGHT: 67 IN | DIASTOLIC BLOOD PRESSURE: 66 MMHG | OXYGEN SATURATION: 97 % | HEART RATE: 73 BPM

## 2024-10-23 DIAGNOSIS — Z79.1 NSAID LONG-TERM USE: ICD-10-CM

## 2024-10-23 DIAGNOSIS — R10.13 EPIGASTRIC PAIN: ICD-10-CM

## 2024-10-23 DIAGNOSIS — R11.2 NAUSEA AND VOMITING, UNSPECIFIED VOMITING TYPE: ICD-10-CM

## 2024-10-23 LAB
ALBUMIN SERPL BCP-MCNC: 4.4 G/DL (ref 3.2–4.9)
ALBUMIN/GLOB SERPL: 1.5 G/DL
ALP SERPL-CCNC: 56 U/L (ref 30–99)
ALT SERPL-CCNC: 19 U/L (ref 2–50)
ANION GAP SERPL CALC-SCNC: 12 MMOL/L (ref 7–16)
AST SERPL-CCNC: 23 U/L (ref 12–45)
BASOPHILS # BLD AUTO: 0.2 % (ref 0–1.8)
BASOPHILS # BLD: 0.02 K/UL (ref 0–0.12)
BILIRUB SERPL-MCNC: 0.5 MG/DL (ref 0.1–1.5)
BUN SERPL-MCNC: 12 MG/DL (ref 8–22)
CALCIUM ALBUM COR SERPL-MCNC: 8.7 MG/DL (ref 8.5–10.5)
CALCIUM SERPL-MCNC: 9 MG/DL (ref 8.5–10.5)
CHLORIDE SERPL-SCNC: 107 MMOL/L (ref 96–112)
CO2 SERPL-SCNC: 21 MMOL/L (ref 20–33)
CREAT SERPL-MCNC: 0.68 MG/DL (ref 0.5–1.4)
EOSINOPHIL # BLD AUTO: 0.08 K/UL (ref 0–0.51)
EOSINOPHIL NFR BLD: 0.9 % (ref 0–6.9)
ERYTHROCYTE [DISTWIDTH] IN BLOOD BY AUTOMATED COUNT: 39.6 FL (ref 35.9–50)
GFR SERPLBLD CREATININE-BSD FMLA CKD-EPI: 113 ML/MIN/1.73 M 2
GLOBULIN SER CALC-MCNC: 2.9 G/DL (ref 1.9–3.5)
GLUCOSE SERPL-MCNC: 143 MG/DL (ref 65–99)
HCG SERPL QL: NEGATIVE
HCT VFR BLD AUTO: 41.5 % (ref 37–47)
HGB BLD-MCNC: 14.1 G/DL (ref 12–16)
IMM GRANULOCYTES # BLD AUTO: 0.04 K/UL (ref 0–0.11)
IMM GRANULOCYTES NFR BLD AUTO: 0.5 % (ref 0–0.9)
LIPASE SERPL-CCNC: 44 U/L (ref 11–82)
LYMPHOCYTES # BLD AUTO: 2.11 K/UL (ref 1–4.8)
LYMPHOCYTES NFR BLD: 24.4 % (ref 22–41)
MCH RBC QN AUTO: 30.1 PG (ref 27–33)
MCHC RBC AUTO-ENTMCNC: 34 G/DL (ref 32.2–35.5)
MCV RBC AUTO: 88.5 FL (ref 81.4–97.8)
MONOCYTES # BLD AUTO: 0.25 K/UL (ref 0–0.85)
MONOCYTES NFR BLD AUTO: 2.9 % (ref 0–13.4)
NEUTROPHILS # BLD AUTO: 6.15 K/UL (ref 1.82–7.42)
NEUTROPHILS NFR BLD: 71.1 % (ref 44–72)
NRBC # BLD AUTO: 0 K/UL
NRBC BLD-RTO: 0 /100 WBC (ref 0–0.2)
PLATELET # BLD AUTO: 270 K/UL (ref 164–446)
PMV BLD AUTO: 9.3 FL (ref 9–12.9)
POTASSIUM SERPL-SCNC: 3.6 MMOL/L (ref 3.6–5.5)
PROT SERPL-MCNC: 7.3 G/DL (ref 6–8.2)
RBC # BLD AUTO: 4.69 M/UL (ref 4.2–5.4)
SODIUM SERPL-SCNC: 140 MMOL/L (ref 135–145)
WBC # BLD AUTO: 8.7 K/UL (ref 4.8–10.8)

## 2024-10-23 PROCEDURE — 96374 THER/PROPH/DIAG INJ IV PUSH: CPT | Mod: XU

## 2024-10-23 PROCEDURE — 700102 HCHG RX REV CODE 250 W/ 637 OVERRIDE(OP): Mod: UD | Performed by: STUDENT IN AN ORGANIZED HEALTH CARE EDUCATION/TRAINING PROGRAM

## 2024-10-23 PROCEDURE — 700117 HCHG RX CONTRAST REV CODE 255: Mod: UD | Performed by: STUDENT IN AN ORGANIZED HEALTH CARE EDUCATION/TRAINING PROGRAM

## 2024-10-23 PROCEDURE — 99285 EMERGENCY DEPT VISIT HI MDM: CPT

## 2024-10-23 PROCEDURE — 96375 TX/PRO/DX INJ NEW DRUG ADDON: CPT

## 2024-10-23 PROCEDURE — 700111 HCHG RX REV CODE 636 W/ 250 OVERRIDE (IP): Mod: JZ,UD | Performed by: STUDENT IN AN ORGANIZED HEALTH CARE EDUCATION/TRAINING PROGRAM

## 2024-10-23 PROCEDURE — 85025 COMPLETE CBC W/AUTO DIFF WBC: CPT

## 2024-10-23 PROCEDURE — 84703 CHORIONIC GONADOTROPIN ASSAY: CPT

## 2024-10-23 PROCEDURE — 74177 CT ABD & PELVIS W/CONTRAST: CPT

## 2024-10-23 PROCEDURE — 36415 COLL VENOUS BLD VENIPUNCTURE: CPT

## 2024-10-23 PROCEDURE — A9270 NON-COVERED ITEM OR SERVICE: HCPCS | Mod: UD | Performed by: STUDENT IN AN ORGANIZED HEALTH CARE EDUCATION/TRAINING PROGRAM

## 2024-10-23 PROCEDURE — 80053 COMPREHEN METABOLIC PANEL: CPT

## 2024-10-23 PROCEDURE — 83690 ASSAY OF LIPASE: CPT

## 2024-10-23 RX ORDER — HALOPERIDOL 5 MG/ML
2.5 INJECTION INTRAMUSCULAR ONCE
Status: COMPLETED | OUTPATIENT
Start: 2024-10-23 | End: 2024-10-23

## 2024-10-23 RX ORDER — DIPHENHYDRAMINE HYDROCHLORIDE 50 MG/ML
25 INJECTION INTRAMUSCULAR; INTRAVENOUS ONCE
Status: COMPLETED | OUTPATIENT
Start: 2024-10-23 | End: 2024-10-23

## 2024-10-23 RX ORDER — HYDROMORPHONE HYDROCHLORIDE 1 MG/ML
0.5 INJECTION, SOLUTION INTRAMUSCULAR; INTRAVENOUS; SUBCUTANEOUS ONCE
Status: DISCONTINUED | OUTPATIENT
Start: 2024-10-23 | End: 2024-10-23

## 2024-10-23 RX ORDER — ONDANSETRON 4 MG/1
4 TABLET, ORALLY DISINTEGRATING ORAL EVERY 6 HOURS PRN
Qty: 10 TABLET | Refills: 0 | Status: SHIPPED | OUTPATIENT
Start: 2024-10-23

## 2024-10-23 RX ORDER — OMEPRAZOLE 40 MG/1
40 CAPSULE, DELAYED RELEASE ORAL DAILY
Qty: 30 CAPSULE | Refills: 0 | Status: SHIPPED | OUTPATIENT
Start: 2024-10-23

## 2024-10-23 RX ORDER — ONDANSETRON 2 MG/ML
4 INJECTION INTRAMUSCULAR; INTRAVENOUS ONCE
Status: DISCONTINUED | OUTPATIENT
Start: 2024-10-23 | End: 2024-10-23

## 2024-10-23 RX ORDER — FAMOTIDINE 20 MG/1
20 TABLET, FILM COATED ORAL ONCE
Status: COMPLETED | OUTPATIENT
Start: 2024-10-23 | End: 2024-10-23

## 2024-10-23 RX ADMIN — HALOPERIDOL LACTATE 2.5 MG: 5 INJECTION, SOLUTION INTRAMUSCULAR at 03:52

## 2024-10-23 RX ADMIN — FAMOTIDINE 20 MG: 20 TABLET, FILM COATED ORAL at 03:52

## 2024-10-23 RX ADMIN — IOHEXOL 100 ML: 350 INJECTION, SOLUTION INTRAVENOUS at 03:45

## 2024-10-23 RX ADMIN — DIPHENHYDRAMINE HYDROCHLORIDE 25 MG: 50 INJECTION, SOLUTION INTRAMUSCULAR; INTRAVENOUS at 03:52

## 2024-10-23 ASSESSMENT — FIBROSIS 4 INDEX: FIB4 SCORE: 0.63

## 2024-11-06 ENCOUNTER — HOSPITAL ENCOUNTER (EMERGENCY)
Facility: MEDICAL CENTER | Age: 39
End: 2024-11-06
Attending: STUDENT IN AN ORGANIZED HEALTH CARE EDUCATION/TRAINING PROGRAM
Payer: MEDICAID

## 2024-11-06 ENCOUNTER — APPOINTMENT (OUTPATIENT)
Dept: RADIOLOGY | Facility: MEDICAL CENTER | Age: 39
End: 2024-11-06
Attending: STUDENT IN AN ORGANIZED HEALTH CARE EDUCATION/TRAINING PROGRAM
Payer: MEDICAID

## 2024-11-06 ENCOUNTER — PHARMACY VISIT (OUTPATIENT)
Dept: PHARMACY | Facility: MEDICAL CENTER | Age: 39
End: 2024-11-06
Payer: COMMERCIAL

## 2024-11-06 VITALS
RESPIRATION RATE: 18 BRPM | SYSTOLIC BLOOD PRESSURE: 100 MMHG | DIASTOLIC BLOOD PRESSURE: 50 MMHG | OXYGEN SATURATION: 97 % | TEMPERATURE: 97.1 F | HEIGHT: 67 IN | WEIGHT: 181.66 LBS | HEART RATE: 53 BPM | BODY MASS INDEX: 28.51 KG/M2

## 2024-11-06 DIAGNOSIS — K80.20 CALCULUS OF GALLBLADDER WITHOUT CHOLECYSTITIS WITHOUT OBSTRUCTION: Primary | ICD-10-CM

## 2024-11-06 LAB
ALBUMIN SERPL BCP-MCNC: 4.3 G/DL (ref 3.2–4.9)
ALBUMIN/GLOB SERPL: 1.3 G/DL
ALP SERPL-CCNC: 68 U/L (ref 30–99)
ALT SERPL-CCNC: 25 U/L (ref 2–50)
ANION GAP SERPL CALC-SCNC: 10 MMOL/L (ref 7–16)
APPEARANCE UR: CLEAR
AST SERPL-CCNC: 31 U/L (ref 12–45)
BACTERIA #/AREA URNS HPF: NORMAL /HPF
BASOPHILS # BLD AUTO: 0.1 % (ref 0–1.8)
BASOPHILS # BLD: 0.01 K/UL (ref 0–0.12)
BILIRUB SERPL-MCNC: 0.6 MG/DL (ref 0.1–1.5)
BILIRUB UR QL STRIP.AUTO: NEGATIVE
BUN SERPL-MCNC: 7 MG/DL (ref 8–22)
CALCIUM ALBUM COR SERPL-MCNC: 8.8 MG/DL (ref 8.5–10.5)
CALCIUM SERPL-MCNC: 9 MG/DL (ref 8.5–10.5)
CASTS URNS QL MICRO: NORMAL /LPF (ref 0–2)
CHLORIDE SERPL-SCNC: 106 MMOL/L (ref 96–112)
CO2 SERPL-SCNC: 21 MMOL/L (ref 20–33)
COLOR UR: YELLOW
CREAT SERPL-MCNC: 0.73 MG/DL (ref 0.5–1.4)
EOSINOPHIL # BLD AUTO: 0.09 K/UL (ref 0–0.51)
EOSINOPHIL NFR BLD: 0.9 % (ref 0–6.9)
EPITHELIAL CELLS 1715: NORMAL /HPF (ref 0–5)
ERYTHROCYTE [DISTWIDTH] IN BLOOD BY AUTOMATED COUNT: 40.9 FL (ref 35.9–50)
GFR SERPLBLD CREATININE-BSD FMLA CKD-EPI: 107 ML/MIN/1.73 M 2
GLOBULIN SER CALC-MCNC: 3.4 G/DL (ref 1.9–3.5)
GLUCOSE SERPL-MCNC: 122 MG/DL (ref 65–99)
GLUCOSE UR STRIP.AUTO-MCNC: NEGATIVE MG/DL
HCG SERPL QL: NEGATIVE
HCT VFR BLD AUTO: 41.5 % (ref 37–47)
HGB BLD-MCNC: 13.9 G/DL (ref 12–16)
IMM GRANULOCYTES # BLD AUTO: 0.04 K/UL (ref 0–0.11)
IMM GRANULOCYTES NFR BLD AUTO: 0.4 % (ref 0–0.9)
KETONES UR STRIP.AUTO-MCNC: NEGATIVE MG/DL
LEUKOCYTE ESTERASE UR QL STRIP.AUTO: NEGATIVE
LIPASE SERPL-CCNC: 52 U/L (ref 11–82)
LYMPHOCYTES # BLD AUTO: 1.4 K/UL (ref 1–4.8)
LYMPHOCYTES NFR BLD: 14.7 % (ref 22–41)
MCH RBC QN AUTO: 30.2 PG (ref 27–33)
MCHC RBC AUTO-ENTMCNC: 33.5 G/DL (ref 32.2–35.5)
MCV RBC AUTO: 90 FL (ref 81.4–97.8)
MICRO URNS: ABNORMAL
MONOCYTES # BLD AUTO: 0.5 K/UL (ref 0–0.85)
MONOCYTES NFR BLD AUTO: 5.3 % (ref 0–13.4)
NEUTROPHILS # BLD AUTO: 7.46 K/UL (ref 1.82–7.42)
NEUTROPHILS NFR BLD: 78.6 % (ref 44–72)
NITRITE UR QL STRIP.AUTO: NEGATIVE
NRBC # BLD AUTO: 0 K/UL
NRBC BLD-RTO: 0 /100 WBC (ref 0–0.2)
PH UR STRIP.AUTO: 7 [PH] (ref 5–8)
PLATELET # BLD AUTO: 229 K/UL (ref 164–446)
PMV BLD AUTO: 9.6 FL (ref 9–12.9)
POTASSIUM SERPL-SCNC: 3.3 MMOL/L (ref 3.6–5.5)
PROT SERPL-MCNC: 7.7 G/DL (ref 6–8.2)
PROT UR QL STRIP: NEGATIVE MG/DL
RBC # BLD AUTO: 4.61 M/UL (ref 4.2–5.4)
RBC # URNS HPF: NORMAL /HPF (ref 0–2)
RBC UR QL AUTO: ABNORMAL
SODIUM SERPL-SCNC: 137 MMOL/L (ref 135–145)
SP GR UR STRIP.AUTO: 1
UROBILINOGEN UR STRIP.AUTO-MCNC: 0.2 EU/DL
WBC # BLD AUTO: 9.5 K/UL (ref 4.8–10.8)
WBC #/AREA URNS HPF: NORMAL /HPF

## 2024-11-06 PROCEDURE — A9270 NON-COVERED ITEM OR SERVICE: HCPCS | Mod: UD | Performed by: STUDENT IN AN ORGANIZED HEALTH CARE EDUCATION/TRAINING PROGRAM

## 2024-11-06 PROCEDURE — 700102 HCHG RX REV CODE 250 W/ 637 OVERRIDE(OP): Mod: UD | Performed by: STUDENT IN AN ORGANIZED HEALTH CARE EDUCATION/TRAINING PROGRAM

## 2024-11-06 PROCEDURE — 99284 EMERGENCY DEPT VISIT MOD MDM: CPT

## 2024-11-06 PROCEDURE — 83690 ASSAY OF LIPASE: CPT

## 2024-11-06 PROCEDURE — 85025 COMPLETE CBC W/AUTO DIFF WBC: CPT

## 2024-11-06 PROCEDURE — 76705 ECHO EXAM OF ABDOMEN: CPT

## 2024-11-06 PROCEDURE — RXMED WILLOW AMBULATORY MEDICATION CHARGE: Performed by: STUDENT IN AN ORGANIZED HEALTH CARE EDUCATION/TRAINING PROGRAM

## 2024-11-06 PROCEDURE — 80053 COMPREHEN METABOLIC PANEL: CPT

## 2024-11-06 PROCEDURE — 81001 URINALYSIS AUTO W/SCOPE: CPT

## 2024-11-06 PROCEDURE — 36415 COLL VENOUS BLD VENIPUNCTURE: CPT

## 2024-11-06 PROCEDURE — 700111 HCHG RX REV CODE 636 W/ 250 OVERRIDE (IP): Mod: UD | Performed by: STUDENT IN AN ORGANIZED HEALTH CARE EDUCATION/TRAINING PROGRAM

## 2024-11-06 PROCEDURE — 84703 CHORIONIC GONADOTROPIN ASSAY: CPT

## 2024-11-06 RX ORDER — HYDROCODONE BITARTRATE AND ACETAMINOPHEN 5; 325 MG/1; MG/1
1 TABLET ORAL ONCE
Status: COMPLETED | OUTPATIENT
Start: 2024-11-06 | End: 2024-11-06

## 2024-11-06 RX ORDER — FAMOTIDINE 20 MG/1
20 TABLET, FILM COATED ORAL ONCE
Status: COMPLETED | OUTPATIENT
Start: 2024-11-06 | End: 2024-11-06

## 2024-11-06 RX ORDER — FAMOTIDINE 20 MG/1
20 TABLET, FILM COATED ORAL 2 TIMES DAILY
Qty: 60 TABLET | Refills: 0 | Status: SHIPPED | OUTPATIENT
Start: 2024-11-06

## 2024-11-06 RX ORDER — ONDANSETRON 4 MG/1
4 TABLET, ORALLY DISINTEGRATING ORAL ONCE
Status: COMPLETED | OUTPATIENT
Start: 2024-11-06 | End: 2024-11-06

## 2024-11-06 RX ORDER — ONDANSETRON 4 MG/1
4 TABLET, ORALLY DISINTEGRATING ORAL EVERY 6 HOURS PRN
Qty: 10 TABLET | Refills: 0 | Status: SHIPPED | OUTPATIENT
Start: 2024-11-06

## 2024-11-06 RX ADMIN — LIDOCAINE HYDROCHLORIDE 30 ML: 20 SOLUTION ORAL; TOPICAL at 17:47

## 2024-11-06 RX ADMIN — FAMOTIDINE 20 MG: 20 TABLET, FILM COATED ORAL at 17:47

## 2024-11-06 RX ADMIN — HYDROCODONE BITARTRATE AND ACETAMINOPHEN 1 TABLET: 5; 325 TABLET ORAL at 17:47

## 2024-11-06 RX ADMIN — ONDANSETRON 4 MG: 4 TABLET, ORALLY DISINTEGRATING ORAL at 17:47

## 2024-11-06 ASSESSMENT — FIBROSIS 4 INDEX: FIB4 SCORE: 0.76

## 2024-11-07 NOTE — ED PROVIDER NOTES
"ED Provider Note    CHIEF COMPLAINT  Chief Complaint   Patient presents with    Abdominal Pain     Mid epigastric abdominal pain. Seen on 10/23 for same complaint and reports pain is similar. Reports nausea, denies vomiting and diarrhea.     Vaginal Bleeding     Patient currently on Depo shot. Reports vaginal bleeding for over a week. Reports recent unprotected intercourse.        EXTERNAL RECORDS REVIEWED  Other previous ED note from 10/23/2024 where patient was seen for similar complaint, had labs and imaging.  CT scan showed hepatomegaly with gallstones.  Labs are otherwise largely unremarkable.  Patient was treated with analgesics and antiemetics discharged home.    HPI/ROS  LIMITATION TO HISTORY   Select: : None  OUTSIDE HISTORIAN(S):  None    Karin Manzo is a 39 y.o. female who presents abdominal pain onset around 1:30 PM. Patient reports that she felt a \"bubble\" in her upper stomach when then the pain started radiating to her back. Her back then started cramping and she could not get comfortable. She then tried Maalox but did not notice any symptoms relief and later took some Pepto-Bismol. Denies taking any other medications for her symptoms. Patient did not notice any relief in her abdominal symptoms. She endorses some nausea but denies any active vomiting. Denies any fevers. Patient has only  eaten a hot dog and a sandwich today. The patient reports that she was here in the ED about a week and a half ago with similar symptoms. Her pain last time was worse than today and she had associated vomiting last time as well. Patient reported that she used to take an NSAID every day but then stopped after her last visit to the ED. She has had some vaginal bleeding for the past week. Patient noted that she recently started the Depo shot about a month ago. Denies any dysuria or other abnormal vaginal discharge. The patient added that she engages in unprotected sex and would like to have a pregnancy test. " She has not had any URI symptoms such as cough or fever. Patient admits to occasional drinks of alcohol as well as last using a pen with marijuana four days ago. She also has a history of abdominal surgery in 2017.     PAST MEDICAL HISTORY   has a past medical history of Abnormal Pap smear of vagina and vaginal HPV, Alcohol abuse (3/19/2013), Arthritis (8/2014), Cold, H/O hypotension (2008), Headache(784.0), Heart murmur (2002), HPV in female, Iron deficiency anemia (3/10/2019), Kidney disease (2208), Other specified symptom associated with female genital organs, Unspecified urinary incontinence, and Urinary, incontinence, stress female previously scheduled for surgery with Dr Singh but canceled on the day of (3/20/2015).    SURGICAL HISTORY   has a past surgical history that includes mini laparotomy (7/17/2017) and salpingectomy (Left, 7/17/2017).    FAMILY HISTORY  Family History   Problem Relation Age of Onset    Alcohol/Drug Mother         alcoholic, pt. states mom has kidney failure due to alcohol    Diabetes Father         pills    Hypertension Father     Other Father         anxiety    Thyroid Maternal Grandmother     Other Paternal Grandmother         Pt. states grandma has low blood sugars and anxiety    Diabetes Paternal Grandfather     Other Paternal Grandfather         emphysema       SOCIAL HISTORY  Social History     Tobacco Use    Smoking status: Some Days     Current packs/day: 0.15     Types: Cigarettes    Smokeless tobacco: Never   Vaping Use    Vaping status: Every Day    Substances: Nicotine, Flavoring   Substance and Sexual Activity    Alcohol use: Not Currently     Comment: occ    Drug use: Not Currently     Types: Inhaled     Comment: marijuana    Sexual activity: Yes     Partners: Male     Comment: NUVA RING        CURRENT MEDICATIONS  Home Medications    **Home medications have not yet been reviewed for this encounter**         ALLERGIES  Allergies   Allergen Reactions    Nkda [No Known  "Drug Allergy]        PHYSICAL EXAM  VITAL SIGNS: BP 98/48   Pulse (!) 54   Temp 36.2 °C (97.1 °F) (Temporal)   Resp 16   Ht 1.702 m (5' 7\")   Wt 82.4 kg (181 lb 10.5 oz)   SpO2 100%   BMI 28.45 kg/m²    Constitutional: Awake and alert  HENT: Normal inspection  Eyes: Normal inspection  Neck: Grossly normal range of motion.  Cardiovascular: Normal heart rate, Normal rhythm.  Symmetric peripheral pulses.   Thorax & Lungs: No respiratory distress, No wheezing, No rales, No rhonchi, No chest tenderness.   Abdomen: Bowel sounds normal, soft, non-distended, tenderness to palpation in the epigastric region, negative Grimes's sign, no tenderness to the right lower quadrant, no peritoneal signs, no guarding or rebound, no mass  Skin: No obvious rash.  Back: No tenderness, No CVA tenderness.   Extremities: No clubbing, cyanosis, edema, no Homans or cords.  Neurologic: Grossly normal   Psychiatric: Normal for situation     EKG/LABS  Labs Reviewed   CBC WITH DIFFERENTIAL - Abnormal; Notable for the following components:       Result Value    Neutrophils-Polys 78.60 (*)     Lymphocytes 14.70 (*)     Neutrophils (Absolute) 7.46 (*)     All other components within normal limits   COMP METABOLIC PANEL   LIPASE   HCG QUAL SERUM   URINALYSIS,CULTURE IF INDICATED      I have independently interpreted this EKG    RADIOLOGY/PROCEDURES   I have independently interpreted the diagnostic imaging associated with this visit and am waiting the final reading from the radiologist.   My preliminary interpretation is as follows: Patient has gallstone, no pericholecystic fluid or gallbladder wall thickening    Radiologist interpretation:  US-RUQ   Final Result      1.  Cholelithiasis with prominent nonmobile gallstone in the gallbladder neck and mild gallbladder distention. Otherwise no sonographic evidence for acute cholecystitis.   2.  No biliary dilatation.   3.  Mild hepatomegaly.          COURSE & MEDICAL DECISION MAKING    ASSESSMENT, " COURSE AND PLAN  Care Narrative: Is a 39-year-old female presenting to the emergency department with epigastric abdominal pain associate with nausea.  Does have a history of cholelithiasis seen on CT scan.  She denies any associated bloody or bilious emesis, denies any stool changes.  Patient states that she has had some vaginal bleeding, currently on Depo shot.  Denies any other abnormal vaginal discharge.  Denies any urinary symptoms.  Only previous abdominal surgery is salpingectomy, distant.  On exam patient did have tenderness to palpation epigastric region, negative Grimes sign, no peritoneal signs or guarding.  Will obtain CBC, CMP, lipase, beta-hCG as well as urinalysis.  Will get an ultrasound to evaluate for any acute cholecystitis or cord lithiasis.    5:35 PM - Patient seen and examined at bedside. Discussed plan of care, including a diagnostic work up with labs and imaging. Patient agrees to the plan of care. The patient will be medicated with Norco 5-325 mg per tablet 1 tablet and Pepcid 20 mg tablet, Zofran ODT 4 mg dispertab and GI Cocktail 30 mL oral suspension. Ordered for US-RUQ, CBC w/ Diff, HCG Qual Serum, CMP, Lipase and UA culture if indicated to evaluate her symptoms.      Labs reviewed that showed no significant leukocytosis, no significant anemia, metabolic panel largely normal including liver failure enzymes.  Patient did have some mild hypokalemia.  Urine did not show any evidence of infection, lipase was normal and beta-hCG was negative.    6:37 PM - Patient was reevaluated at bedside. She reported that her pain has improved. Patient is currently having her ultrasound done.     Reviewed imaging that shows a prominent gallbladder stone.  No evidence of acute cholecystitis at this time.    And again reassessed and was completely pain-free, tolerating orals.  Differential diagnose considered.  Gallstones with biliary colic.  Doubt acute cholecystitis ascending cholangitis, gallstone  pancreatitis, pancreatitis, perforated viscus, surgical emergency.  Extensive discussion had with patient regarding follow-up with PCP for referral to general surgeon and outpatient cholecystectomy.  Advised staying away from fatty meals.  Due to patient's history of stomach inflammation and possible peptic ulcer disease did not advise NSAIDs at this time, recommended Tylenol.  Did prescribe antiemetics and put a referral in for PCP establishment although patient states that she does get care, although does the name of the doctor.    7:51 PM - Patient was reevaluated at bedside. Discussed lab and radiology results with the patient. I informed her of the plan for discharge with at home symptom management. She was allowed to ask questions and agrees to the plan of care.     ADDITIONAL PROBLEMS MANAGED  Symptomatic gallstones    DISPOSITION AND DISCUSSIONS  I have discussed management of the patient with the following physicians and MILLICENT's:  none    Discussion of management with other [facility] or appropriate source(s): None     Escalation of care considered, and ultimately not performed:acute inpatient care management, however at this time, the patient is most appropriate for outpatient management    Barriers to care at this time, including but not limited to: Patient does not have established PCP.        The patient will return for new or worsening symptoms and is stable at the time of discharge.    The patient is referred to a primary physician for blood pressure management, diabetic screening, and for all other preventative health concerns.    DISPOSITION:  Patient will be discharged home in stable condition.    FOLLOW UP:  [facility] ([facility]) - Primary Care and Family Medicine  [address] [place] [id number]  [phone]          OUTPATIENT MEDICATIONS:  Discharge Medication List as of 11/6/2024  7:43 PM        START taking these medications    Details   famotidine (PEPCID) 20 MG Tab Take 1  Tablet by mouth 2 times a day., Disp-60 Tablet, R-0, Normal              FINAL DIAGNOSIS  1. Calculus of gallbladder without cholecystitis without obstruction Acute      Shellie REID (Brandonibcarmencita), am scribing for, and in the presence of, Srini Ha M.D..    Electronically signed by: Shellie Bryan (Scribe), 11/6/2024    ISrini M.D. personally performed the services described in this documentation, as scribed by Shellie Bryan in my presence, and it is both accurate and complete.     Electronically signed by: Srini Ha M.D., 11/6/2024 5:30 PM

## 2024-11-07 NOTE — DISCHARGE INSTRUCTIONS
You are seen and evaluated in the emergency department for your abdominal pain.  Your ultrasound shows that you have a large gallstone.  Likely, there is no evidence of gallbladder infection at this time and you were treated with pain medications with improvement in your symptoms.  You will need your gallbladder likely taken out on an outpatient basis.  I do recommend that you stay away from any fatty foods.  You can use over-the-counter medications like Tylenol, I recommend due to your history of reflux that you stay away from anti-inflammatories at this time.  I will prescribe you some medications help coat the inside your stomach as well as some nausea medicine.  If you have any returning or symptoms that last longer than 4 hours with intractable nausea or vomiting please return to the emergency department for further evaluation as sometimes your gallbladder can get inflamed and infected from gallstones.

## 2024-11-07 NOTE — ED TRIAGE NOTES
Chief Complaint   Patient presents with    Abdominal Pain     Mid epigastric abdominal pain. Seen on 10/23 for same complaint and reports pain is similar. Reports nausea, denies vomiting and diarrhea.     Vaginal Bleeding     Patient currently on Depo shot. Reports vaginal bleeding for over a week. Reports recent unprotected intercourse.
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
## 2024-12-23 ENCOUNTER — PHARMACY VISIT (OUTPATIENT)
Dept: PHARMACY | Facility: MEDICAL CENTER | Age: 39
End: 2024-12-23
Payer: COMMERCIAL

## 2024-12-23 ENCOUNTER — APPOINTMENT (OUTPATIENT)
Dept: RADIOLOGY | Facility: MEDICAL CENTER | Age: 39
End: 2024-12-23
Payer: MEDICAID

## 2024-12-23 ENCOUNTER — APPOINTMENT (OUTPATIENT)
Dept: RADIOLOGY | Facility: MEDICAL CENTER | Age: 39
End: 2024-12-23
Attending: EMERGENCY MEDICINE
Payer: MEDICAID

## 2024-12-23 ENCOUNTER — HOSPITAL ENCOUNTER (EMERGENCY)
Facility: MEDICAL CENTER | Age: 39
End: 2024-12-23
Attending: EMERGENCY MEDICINE
Payer: MEDICAID

## 2024-12-23 VITALS
RESPIRATION RATE: 16 BRPM | OXYGEN SATURATION: 98 % | BODY MASS INDEX: 26.37 KG/M2 | HEIGHT: 67 IN | TEMPERATURE: 97.2 F | DIASTOLIC BLOOD PRESSURE: 57 MMHG | WEIGHT: 167.99 LBS | HEART RATE: 57 BPM | SYSTOLIC BLOOD PRESSURE: 108 MMHG

## 2024-12-23 DIAGNOSIS — M22.2X1 PATELLOFEMORAL DISORDER OF RIGHT KNEE: ICD-10-CM

## 2024-12-23 DIAGNOSIS — M25.461 EFFUSION OF RIGHT KNEE: ICD-10-CM

## 2024-12-23 LAB
ANION GAP SERPL CALC-SCNC: 11 MMOL/L (ref 7–16)
BASOPHILS # BLD AUTO: 0.3 % (ref 0–1.8)
BASOPHILS # BLD: 0.02 K/UL (ref 0–0.12)
BUN SERPL-MCNC: 6 MG/DL (ref 8–22)
CALCIUM SERPL-MCNC: 8.2 MG/DL (ref 8.5–10.5)
CHLORIDE SERPL-SCNC: 108 MMOL/L (ref 96–112)
CO2 SERPL-SCNC: 22 MMOL/L (ref 20–33)
CREAT SERPL-MCNC: 0.68 MG/DL (ref 0.5–1.4)
EOSINOPHIL # BLD AUTO: 0.15 K/UL (ref 0–0.51)
EOSINOPHIL NFR BLD: 2 % (ref 0–6.9)
ERYTHROCYTE [DISTWIDTH] IN BLOOD BY AUTOMATED COUNT: 44.4 FL (ref 35.9–50)
GFR SERPLBLD CREATININE-BSD FMLA CKD-EPI: 113 ML/MIN/1.73 M 2
GLUCOSE SERPL-MCNC: 69 MG/DL (ref 65–99)
HCG SERPL QL: NEGATIVE
HCT VFR BLD AUTO: 40.1 % (ref 37–47)
HGB BLD-MCNC: 13.3 G/DL (ref 12–16)
IMM GRANULOCYTES # BLD AUTO: 0.03 K/UL (ref 0–0.11)
IMM GRANULOCYTES NFR BLD AUTO: 0.4 % (ref 0–0.9)
LYMPHOCYTES # BLD AUTO: 1.94 K/UL (ref 1–4.8)
LYMPHOCYTES NFR BLD: 25.5 % (ref 22–41)
MCH RBC QN AUTO: 30.3 PG (ref 27–33)
MCHC RBC AUTO-ENTMCNC: 33.2 G/DL (ref 32.2–35.5)
MCV RBC AUTO: 91.3 FL (ref 81.4–97.8)
MONOCYTES # BLD AUTO: 0.7 K/UL (ref 0–0.85)
MONOCYTES NFR BLD AUTO: 9.2 % (ref 0–13.4)
NEUTROPHILS # BLD AUTO: 4.76 K/UL (ref 1.82–7.42)
NEUTROPHILS NFR BLD: 62.6 % (ref 44–72)
NRBC # BLD AUTO: 0 K/UL
NRBC BLD-RTO: 0 /100 WBC (ref 0–0.2)
PLATELET # BLD AUTO: 227 K/UL (ref 164–446)
PMV BLD AUTO: 9.4 FL (ref 9–12.9)
POTASSIUM SERPL-SCNC: 3.4 MMOL/L (ref 3.6–5.5)
RBC # BLD AUTO: 4.39 M/UL (ref 4.2–5.4)
SODIUM SERPL-SCNC: 141 MMOL/L (ref 135–145)
URATE SERPL-MCNC: 3.9 MG/DL (ref 1.9–8.2)
WBC # BLD AUTO: 7.6 K/UL (ref 4.8–10.8)

## 2024-12-23 PROCEDURE — 84703 CHORIONIC GONADOTROPIN ASSAY: CPT

## 2024-12-23 PROCEDURE — 73564 X-RAY EXAM KNEE 4 OR MORE: CPT | Mod: RT

## 2024-12-23 PROCEDURE — 700102 HCHG RX REV CODE 250 W/ 637 OVERRIDE(OP): Mod: UD | Performed by: EMERGENCY MEDICINE

## 2024-12-23 PROCEDURE — 99284 EMERGENCY DEPT VISIT MOD MDM: CPT

## 2024-12-23 PROCEDURE — 80048 BASIC METABOLIC PNL TOTAL CA: CPT

## 2024-12-23 PROCEDURE — 85025 COMPLETE CBC W/AUTO DIFF WBC: CPT

## 2024-12-23 PROCEDURE — A9270 NON-COVERED ITEM OR SERVICE: HCPCS | Mod: UD | Performed by: EMERGENCY MEDICINE

## 2024-12-23 PROCEDURE — 700111 HCHG RX REV CODE 636 W/ 250 OVERRIDE (IP): Mod: JZ,UD | Performed by: EMERGENCY MEDICINE

## 2024-12-23 PROCEDURE — 96372 THER/PROPH/DIAG INJ SC/IM: CPT

## 2024-12-23 PROCEDURE — 84550 ASSAY OF BLOOD/URIC ACID: CPT

## 2024-12-23 PROCEDURE — 36415 COLL VENOUS BLD VENIPUNCTURE: CPT

## 2024-12-23 PROCEDURE — RXMED WILLOW AMBULATORY MEDICATION CHARGE: Performed by: EMERGENCY MEDICINE

## 2024-12-23 RX ORDER — KETOROLAC TROMETHAMINE 15 MG/ML
15 INJECTION, SOLUTION INTRAMUSCULAR; INTRAVENOUS ONCE
Status: COMPLETED | OUTPATIENT
Start: 2024-12-23 | End: 2024-12-23

## 2024-12-23 RX ORDER — COLCHICINE 0.6 MG/1
0.6 TABLET ORAL ONCE
Status: COMPLETED | OUTPATIENT
Start: 2024-12-23 | End: 2024-12-23

## 2024-12-23 RX ORDER — HYDROCODONE BITARTRATE AND ACETAMINOPHEN 5; 325 MG/1; MG/1
1 TABLET ORAL EVERY 6 HOURS PRN
Qty: 7 TABLET | Refills: 0 | Status: SHIPPED | OUTPATIENT
Start: 2024-12-23 | End: 2024-12-26

## 2024-12-23 RX ORDER — OXYCODONE AND ACETAMINOPHEN 5; 325 MG/1; MG/1
1 TABLET ORAL ONCE
Status: COMPLETED | OUTPATIENT
Start: 2024-12-23 | End: 2024-12-23

## 2024-12-23 RX ADMIN — KETOROLAC TROMETHAMINE 15 MG: 15 INJECTION, SOLUTION INTRAMUSCULAR; INTRAVENOUS at 21:22

## 2024-12-23 RX ADMIN — OXYCODONE HYDROCHLORIDE AND ACETAMINOPHEN 1 TABLET: 5; 325 TABLET ORAL at 20:33

## 2024-12-23 RX ADMIN — COLCHICINE 0.6 MG: 0.6 TABLET, FILM COATED ORAL at 21:19

## 2024-12-23 ASSESSMENT — PAIN DESCRIPTION - PAIN TYPE
TYPE: ACUTE PAIN
TYPE: ACUTE PAIN

## 2024-12-23 ASSESSMENT — FIBROSIS 4 INDEX: FIB4 SCORE: 1.06

## 2024-12-24 NOTE — ED NOTES
Crutches provided to patient, education given. Patient verbalized understanding. Patient able to demonstrate use of crutches without incident.
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
## 2024-12-24 NOTE — ED NOTES
Pt ready for discharge, instructions given, verbalizes understanding  Pt given directions to pharmacy.

## 2024-12-24 NOTE — ED PROVIDER NOTES
"ED Provider Note    CHIEF COMPLAINT  Chief Complaint   Patient presents with    Knee Pain     Pt states right knee started swelling up a couple days ago but the pain has got worse  and swelling has not gone down. Denies any new trauma to knee, states does have hx of injuries to it.        HPI/ROS  LIMITATION TO HISTORY   Select: : None  OUTSIDE HISTORIAN(S):  jeanie    Karin Manzo is a 39 y.o. female who presents to the emerged primary chief complaint of right knee pain over the last couple of days.  She cannot recall an acute injury to it.  She does state that this has happened before and she was seen by her primary care who just gave her an Ace wrap and told her to rest.  She has not ever followed up with orthopedics.  She has had podiatry injected into her feet before but is never really specifically seen orthopedics.  She did denies any fevers chills or micromotion tenderness states she can flex it okay but the pain is radiates from the front of the knee to the back.  She states at 1 point she thought that she felt like a ligament can of pop in her knee and move over which she then \"pushed back\" just wants to make sure everything is okay    PAST MEDICAL HISTORY   has a past medical history of Abnormal Pap smear of vagina and vaginal HPV, Alcohol abuse (3/19/2013), Arthritis (8/2014), Cold, H/O hypotension (2008), Headache(784.0), Heart murmur (2002), HPV in female, Iron deficiency anemia (3/10/2019), Kidney disease (2208), Other specified symptom associated with female genital organs, Unspecified urinary incontinence, and Urinary, incontinence, stress female previously scheduled for surgery with Dr Singh but canceled on the day of (3/20/2015).    SURGICAL HISTORY   has a past surgical history that includes mini laparotomy (7/17/2017) and salpingectomy (Left, 7/17/2017).    FAMILY HISTORY  Family History   Problem Relation Age of Onset    Alcohol/Drug Mother         alcoholic, pt. states mom has " "kidney failure due to alcohol    Diabetes Father         pills    Hypertension Father     Other Father         anxiety    Thyroid Maternal Grandmother     Other Paternal Grandmother         Pt. states grandma has low blood sugars and anxiety    Diabetes Paternal Grandfather     Other Paternal Grandfather         emphysema       SOCIAL HISTORY  Social History     Tobacco Use    Smoking status: Some Days     Current packs/day: 0.15     Types: Cigarettes    Smokeless tobacco: Never   Vaping Use    Vaping status: Every Day    Substances: Nicotine, Flavoring   Substance and Sexual Activity    Alcohol use: Yes     Comment: occ    Drug use: Not Currently     Types: Inhaled     Comment: marijuana    Sexual activity: Yes     Partners: Male     Comment: NUVA RING        CURRENT MEDICATIONS  Home Medications       Reviewed by Rajani Khanna R.N. (Registered Nurse) on 12/23/24 at [facility]  Med List Status: Not Addressed     Medication Last Dose Status   famotidine (PEPCID) 20 MG Tab  Active   fexofenadine-pseudoephedrine (ALLEGRA-D)  MG per tablet  Active   ibuprofen (MOTRIN) 600 MG Tab  Active   mag hydrox-al hydrox-simeth (MAALOX PLUS ES OR MYLANTA DS) 400-400-40 MG/5ML Suspension  Active   omeprazole (PRILOSEC) 40 MG delayed-release capsule  Active   omeprazole (PRILOSEC) 40 MG delayed-release capsule  Active   ondansetron (ZOFRAN ODT) 4 MG TABLET DISPERSIBLE  Active   Prenatal MV-Min-Fe Fum-FA-DHA (PRENATAL 1 PO)  Active                  Audit from Redirected Encounters    **Home medications have not yet been reviewed for this encounter**         ALLERGIES  Allergies   Allergen Reactions    Nkda [No Known Drug Allergy]        PHYSICAL EXAM  VITAL SIGNS: /68   Pulse 74   Temp 36.3 °C (97.4 °F) (Temporal)   Resp 16   Ht 1.702 m (5' 7\")   Wt 76.2 kg (167 lb 15.9 oz)   SpO2 98%   BMI 26.31 kg/m²    Pulse OX: Pulse Oxygen level is within normal limit  Constitutional: Alert in no apparent distress.  HENT: " Normocephalic, Atraumatic, MMM  Eyes: PERound. Conjunctiva normal, non-icteric.   Heart: Regular rate and rhythm, intact distal pulses   Lungs: Symmetrical movement, no resp distress   Abdomen: Non-tender, non-distended, normal bowel sounds  EXT/Back right lower extremity there is significant joint effusion of the right knee but no micromotion tenderness almost near flexion but full extension patella tibias to be in the appropriate position not warm not he did not erythematous distal pulses intact  Skin: Warm, Dry, No erythema, No rash.   Neurologic: Alert and oriented, Grossly non-focal.       EKG/LABS  Labs Reviewed   BASIC METABOLIC PANEL - Abnormal; Notable for the following components:       Result Value    Potassium 3.4 (*)     Bun 6 (*)     Calcium 8.2 (*)     All other components within normal limits   URIC ACID   CBC WITH DIFFERENTIAL   HCG QUAL SERUM   ESTIMATED GFR       I have independently interpreted this EKG    RADIOLOGY/PROCEDURES   I have independently interpreted the diagnostic imaging associated with this visit and am waiting the final reading from the radiologist.   My preliminary interpretation is as follows:     X-ray of the right knee with joint effusion    Radiologist interpretation:  DX-KNEE COMPLETE 4+ RIGHT   Final Result         1.  No acute traumatic bony injury.   2.  Widening of the medial patellofemoral joint with adjacent soft tissue swelling, consider ligamentous injury.   3.  Small knee joint effusion          COURSE & MEDICAL DECISION MAKING    ASSESSMENT, COURSE AND PLAN  Care Narrative:     Patient is 39-year-old female presents to the emergency department with significant joint effusion on examination by low concern for infectious process.  She states she felt like a popping sensation a while ago maybe a ligament had popped removed.  No fevers chills no micromotion tenderness she states this has happened before so gout is on the differential just versus a ligamentous or joint  injury.  Should be treated with oral pain management imaging and laboratory analysis    DISPOSITION AND DISCUSSIONS  9:51 PM  I reassessed patient at bedside.  She is resting comfortably we discussed her laboratory analysis which was all unremarkable negative uric acid x-ray showing signs of possible ligamentous injury which would be consistent with her story.  She was given an Ace wrap and crutches we discussed NSAIDs elevation ice packs and pain management for a few days and follow-up with orthopedics.  She understands feels comfortable going home    In prescribing controlled substances to this patient, I certify that I have obtained and reviewed the medical history of Karin Manzo. I have also made a good berkley effort to obtain applicable records from other providers who have treated the patient and records did not demonstrate any increased risk of substance abuse that would prevent me from prescribing controlled substances.     I have conducted a physical exam and documented it. I have reviewed Ms. Manzo’s prescription history as maintained by the [place] Prescription Monitoring Program.     I have assessed the patient’s risk for abuse, dependency, and addiction using the validated Opioid Risk Tool available at https://www.mdcalc.com/hbcmlo-obne-zogu-ort-narcotic-abuse. 0    Given the above, I believe the benefits of controlled substance therapy outweigh the risks. The reasons for prescribing controlled substances include non-narcotic, oral analgesic alternatives have been inadequate for pain control. Accordingly, I have discussed the risk and benefits, treatment plan, and alternative therapies with the patient.         I have discussed management of the patient with the following physicians and MILLICENT's:  none    Discussion of management with other [facility] or appropriate source(s): None     Escalation of care considered, and ultimately not performed:acute inpatient care management, however at this time,  the patient is most appropriate for outpatient management    Barriers to care at this time, including but not limited to:  na .     Decision tools and prescription drugs considered including, but not limited to: Pain Medications were prescribed .    The patient will return for new or worsening symptoms and is stable at the time of discharge.    The patient is referred to a primary physician for blood pressure management, diabetic screening, and for all other preventative health concerns.    DISPOSITION:  Patient will be discharged home in stable condition.    FOLLOW UP:  [facility], Emergency Dept  [address] [address] [phone]    If symptoms worsen    Quoc Alanis M.D.  [address] [id number] [phone]    Schedule an appointment as soon as possible for a visit         OUTPATIENT MEDICATIONS:  New Prescriptions    HYDROCODONE-ACETAMINOPHEN (NORCO) 5-325 MG TAB PER TABLET    Take 1 Tablet by mouth every 6 hours as needed (severe pain) for up to 3 days.         FINAL DIAGNOSIS  1. Patellofemoral disorder of right knee    2. Effusion of right knee         Electronically signed by: Anne Marie Rodriguez M.D., 12/23/2024 8:19 PM

## 2025-05-10 NOTE — ED TRIAGE NOTES
Patient comes in with flu like symptoms, nasal congestion, cough, body aches.  She is not vaccinated.  Smokes.  Also states her boyfriend exposed her to possible std.     IUP IUP

## 2025-05-30 ENCOUNTER — APPOINTMENT (OUTPATIENT)
Dept: ADMISSIONS | Facility: MEDICAL CENTER | Age: 40
End: 2025-05-30
Attending: SURGERY
Payer: MEDICAID

## 2025-06-06 ENCOUNTER — PRE-ADMISSION TESTING (OUTPATIENT)
Dept: ADMISSIONS | Facility: MEDICAL CENTER | Age: 40
End: 2025-06-06
Attending: SURGERY
Payer: MEDICAID

## 2025-06-06 RX ORDER — BACLOFEN 10 MG/1
10 TABLET ORAL 3 TIMES DAILY
COMMUNITY
Start: 2025-04-01 | End: 2025-06-29

## 2025-06-06 RX ORDER — GABAPENTIN 300 MG/1
300 CAPSULE ORAL 3 TIMES DAILY
COMMUNITY
Start: 2025-04-01

## 2025-06-06 RX ORDER — LORATADINE 10 MG/1
10 TABLET ORAL DAILY
Status: ON HOLD | COMMUNITY
End: 2025-06-19

## 2025-06-06 RX ORDER — VITAMIN B COMPLEX
TABLET ORAL
COMMUNITY
Start: 2025-05-21 | End: 2025-08-18

## 2025-06-06 NOTE — PREADMIT AVS NOTE
Current Medications   Medication Instructions    loratadine (CLARITIN) 10 MG Tab Continue taking medication as prescribed, including morning of procedure     Multiple Vitamins-Minerals (ZINC PO) Stop 7 days before surgery    VITAMIN D PO Stop 7 days before surgery    baclofen (LIORESAL) 10 MG Tab Continue taking medication as prescribed, including morning of procedure     gabapentin (NEURONTIN) 300 MG Cap Continue taking medication as prescribed, including morning of procedure     B Complex Vitamins (VITAMIN B COMPLEX W/B-12) Tab Stop 7 days before surgery    omeprazole (PRILOSEC) 40 MG delayed-release capsule Continue taking medication as prescribed, including morning of procedure     ibuprofen (MOTRIN) 600 MG Tab Stop 5 days before surgery

## 2025-06-19 ENCOUNTER — ANESTHESIA EVENT (OUTPATIENT)
Dept: SURGERY | Facility: MEDICAL CENTER | Age: 40
End: 2025-06-19
Payer: MEDICAID

## 2025-06-19 ENCOUNTER — ANESTHESIA (OUTPATIENT)
Dept: SURGERY | Facility: MEDICAL CENTER | Age: 40
End: 2025-06-19
Payer: MEDICAID

## 2025-06-19 ENCOUNTER — PHARMACY VISIT (OUTPATIENT)
Dept: PHARMACY | Facility: MEDICAL CENTER | Age: 40
End: 2025-06-19
Payer: COMMERCIAL

## 2025-06-19 ENCOUNTER — HOSPITAL ENCOUNTER (OUTPATIENT)
Facility: MEDICAL CENTER | Age: 40
End: 2025-06-19
Attending: SURGERY | Admitting: SURGERY
Payer: MEDICAID

## 2025-06-19 VITALS
RESPIRATION RATE: 16 BRPM | DIASTOLIC BLOOD PRESSURE: 100 MMHG | TEMPERATURE: 96.6 F | SYSTOLIC BLOOD PRESSURE: 132 MMHG | HEART RATE: 78 BPM | HEIGHT: 68 IN | OXYGEN SATURATION: 99 % | WEIGHT: 170.86 LBS | BODY MASS INDEX: 25.89 KG/M2

## 2025-06-19 DIAGNOSIS — G89.18 POSTOPERATIVE PAIN: Primary | ICD-10-CM

## 2025-06-19 LAB
ANION GAP SERPL CALC-SCNC: 13 MMOL/L (ref 7–16)
BUN SERPL-MCNC: 14 MG/DL (ref 8–22)
CALCIUM SERPL-MCNC: 9.2 MG/DL (ref 8.5–10.5)
CHLORIDE SERPL-SCNC: 105 MMOL/L (ref 96–112)
CO2 SERPL-SCNC: 22 MMOL/L (ref 20–33)
CREAT SERPL-MCNC: 0.72 MG/DL (ref 0.5–1.4)
ERYTHROCYTE [DISTWIDTH] IN BLOOD BY AUTOMATED COUNT: 39.2 FL (ref 35.9–50)
GFR SERPLBLD CREATININE-BSD FMLA CKD-EPI: 108 ML/MIN/1.73 M 2
GLUCOSE SERPL-MCNC: 99 MG/DL (ref 65–99)
HCG UR QL: NEGATIVE
HCT VFR BLD AUTO: 44.7 % (ref 37–47)
HGB BLD-MCNC: 15.1 G/DL (ref 12–16)
MCH RBC QN AUTO: 30.1 PG (ref 27–33)
MCHC RBC AUTO-ENTMCNC: 33.8 G/DL (ref 32.2–35.5)
MCV RBC AUTO: 89 FL (ref 81.4–97.8)
PLATELET # BLD AUTO: 292 K/UL (ref 164–446)
PMV BLD AUTO: 8.8 FL (ref 9–12.9)
POTASSIUM SERPL-SCNC: 3.8 MMOL/L (ref 3.6–5.5)
RBC # BLD AUTO: 5.02 M/UL (ref 4.2–5.4)
SODIUM SERPL-SCNC: 140 MMOL/L (ref 135–145)
WBC # BLD AUTO: 8.1 K/UL (ref 4.8–10.8)

## 2025-06-19 PROCEDURE — 160025 RECOVERY II MINUTES (STATS): Performed by: SURGERY

## 2025-06-19 PROCEDURE — 160015 HCHG STAT PREOP MINUTES: Performed by: SURGERY

## 2025-06-19 PROCEDURE — 81025 URINE PREGNANCY TEST: CPT

## 2025-06-19 PROCEDURE — 160046 HCHG PACU - 1ST 60 MINS PHASE II: Performed by: SURGERY

## 2025-06-19 PROCEDURE — 80048 BASIC METABOLIC PNL TOTAL CA: CPT

## 2025-06-19 PROCEDURE — 88304 TISSUE EXAM BY PATHOLOGIST: CPT | Performed by: PATHOLOGY

## 2025-06-19 PROCEDURE — 700111 HCHG RX REV CODE 636 W/ 250 OVERRIDE (IP): Mod: JZ,UD | Performed by: ANESTHESIOLOGY

## 2025-06-19 PROCEDURE — 700105 HCHG RX REV CODE 258: Mod: UD | Performed by: SURGERY

## 2025-06-19 PROCEDURE — 160192 HCHG ANESTHESIA COMPLEX: Performed by: SURGERY

## 2025-06-19 PROCEDURE — 160193 HCHG PACU STANDARD - 1ST 60 MINS: Performed by: SURGERY

## 2025-06-19 PROCEDURE — RXMED WILLOW AMBULATORY MEDICATION CHARGE: Performed by: SURGERY

## 2025-06-19 PROCEDURE — 700102 HCHG RX REV CODE 250 W/ 637 OVERRIDE(OP): Mod: UD | Performed by: ANESTHESIOLOGY

## 2025-06-19 PROCEDURE — 160002 HCHG RECOVERY MINUTES (STAT): Performed by: SURGERY

## 2025-06-19 PROCEDURE — A9270 NON-COVERED ITEM OR SERVICE: HCPCS | Mod: UD | Performed by: ANESTHESIOLOGY

## 2025-06-19 PROCEDURE — 88304 TISSUE EXAM BY PATHOLOGIST: CPT | Mod: 26 | Performed by: PATHOLOGY

## 2025-06-19 PROCEDURE — 700111 HCHG RX REV CODE 636 W/ 250 OVERRIDE (IP): Mod: UD | Performed by: ANESTHESIOLOGY

## 2025-06-19 PROCEDURE — 85027 COMPLETE CBC AUTOMATED: CPT

## 2025-06-19 PROCEDURE — 160029 HCHG SURGERY MINUTES - 1ST 30 MINS LEVEL 4: Performed by: SURGERY

## 2025-06-19 PROCEDURE — 160048 HCHG OR STATISTICAL LEVEL 1-5: Performed by: SURGERY

## 2025-06-19 PROCEDURE — 700101 HCHG RX REV CODE 250: Mod: UD | Performed by: SURGERY

## 2025-06-19 PROCEDURE — 160041 HCHG SURGERY MINUTES - EA ADDL 1 MIN LEVEL 4: Performed by: SURGERY

## 2025-06-19 PROCEDURE — 700101 HCHG RX REV CODE 250: Performed by: ANESTHESIOLOGY

## 2025-06-19 RX ORDER — LIDOCAINE HYDROCHLORIDE 20 MG/ML
INJECTION, SOLUTION EPIDURAL; INFILTRATION; INTRACAUDAL; PERINEURAL PRN
Status: DISCONTINUED | OUTPATIENT
Start: 2025-06-19 | End: 2025-06-19 | Stop reason: SURG

## 2025-06-19 RX ORDER — BUPIVACAINE HYDROCHLORIDE AND EPINEPHRINE 5; 5 MG/ML; UG/ML
INJECTION, SOLUTION EPIDURAL; INTRACAUDAL; PERINEURAL
Status: DISCONTINUED | OUTPATIENT
Start: 2025-06-19 | End: 2025-06-19 | Stop reason: HOSPADM

## 2025-06-19 RX ORDER — DEXAMETHASONE SODIUM PHOSPHATE 4 MG/ML
INJECTION, SOLUTION INTRA-ARTICULAR; INTRALESIONAL; INTRAMUSCULAR; INTRAVENOUS; SOFT TISSUE PRN
Status: DISCONTINUED | OUTPATIENT
Start: 2025-06-19 | End: 2025-06-19 | Stop reason: SURG

## 2025-06-19 RX ORDER — MIDAZOLAM HYDROCHLORIDE 1 MG/ML
INJECTION INTRAMUSCULAR; INTRAVENOUS PRN
Status: DISCONTINUED | OUTPATIENT
Start: 2025-06-19 | End: 2025-06-19 | Stop reason: SURG

## 2025-06-19 RX ORDER — MEPERIDINE HYDROCHLORIDE 50 MG/ML
6.25 INJECTION INTRAMUSCULAR; INTRAVENOUS; SUBCUTANEOUS
Status: DISCONTINUED | OUTPATIENT
Start: 2025-06-19 | End: 2025-06-19 | Stop reason: HOSPADM

## 2025-06-19 RX ORDER — SODIUM CHLORIDE, SODIUM LACTATE, POTASSIUM CHLORIDE, CALCIUM CHLORIDE 600; 310; 30; 20 MG/100ML; MG/100ML; MG/100ML; MG/100ML
INJECTION, SOLUTION INTRAVENOUS CONTINUOUS
Status: DISCONTINUED | OUTPATIENT
Start: 2025-06-19 | End: 2025-06-19 | Stop reason: HOSPADM

## 2025-06-19 RX ORDER — OXYCODONE HCL 5 MG/5 ML
10 SOLUTION, ORAL ORAL
Status: COMPLETED | OUTPATIENT
Start: 2025-06-19 | End: 2025-06-19

## 2025-06-19 RX ORDER — ONDANSETRON 2 MG/ML
4 INJECTION INTRAMUSCULAR; INTRAVENOUS
Status: DISCONTINUED | OUTPATIENT
Start: 2025-06-19 | End: 2025-06-19 | Stop reason: HOSPADM

## 2025-06-19 RX ORDER — DIPHENHYDRAMINE HYDROCHLORIDE 50 MG/ML
12.5 INJECTION, SOLUTION INTRAMUSCULAR; INTRAVENOUS
Status: DISCONTINUED | OUTPATIENT
Start: 2025-06-19 | End: 2025-06-19 | Stop reason: HOSPADM

## 2025-06-19 RX ORDER — HYDRALAZINE HYDROCHLORIDE 20 MG/ML
5 INJECTION INTRAMUSCULAR; INTRAVENOUS
Status: DISCONTINUED | OUTPATIENT
Start: 2025-06-19 | End: 2025-06-19 | Stop reason: HOSPADM

## 2025-06-19 RX ORDER — OXYCODONE HCL 5 MG/5 ML
5 SOLUTION, ORAL ORAL
Status: COMPLETED | OUTPATIENT
Start: 2025-06-19 | End: 2025-06-19

## 2025-06-19 RX ORDER — OXYCODONE HYDROCHLORIDE 5 MG/1
5 TABLET ORAL EVERY 4 HOURS PRN
Qty: 12 TABLET | Refills: 0 | Status: SHIPPED | OUTPATIENT
Start: 2025-06-19 | End: 2025-06-22

## 2025-06-19 RX ORDER — ROCURONIUM BROMIDE 10 MG/ML
INJECTION, SOLUTION INTRAVENOUS PRN
Status: DISCONTINUED | OUTPATIENT
Start: 2025-06-19 | End: 2025-06-19 | Stop reason: SURG

## 2025-06-19 RX ORDER — ONDANSETRON 2 MG/ML
INJECTION INTRAMUSCULAR; INTRAVENOUS PRN
Status: DISCONTINUED | OUTPATIENT
Start: 2025-06-19 | End: 2025-06-19 | Stop reason: SURG

## 2025-06-19 RX ORDER — LABETALOL HYDROCHLORIDE 5 MG/ML
5 INJECTION, SOLUTION INTRAVENOUS
Status: DISCONTINUED | OUTPATIENT
Start: 2025-06-19 | End: 2025-06-19 | Stop reason: HOSPADM

## 2025-06-19 RX ORDER — PSEUDOEPHEDRINE HCL 30 MG/1
60 TABLET, FILM COATED ORAL EVERY 4 HOURS PRN
COMMUNITY

## 2025-06-19 RX ORDER — POLYETHYLENE GLYCOL 3350 17 G/17G
17 POWDER, FOR SOLUTION ORAL DAILY
Qty: 20 EACH | Refills: 0 | Status: SHIPPED | OUTPATIENT
Start: 2025-06-19

## 2025-06-19 RX ORDER — HYDROMORPHONE HYDROCHLORIDE 1 MG/ML
0.2 INJECTION, SOLUTION INTRAMUSCULAR; INTRAVENOUS; SUBCUTANEOUS
Status: DISCONTINUED | OUTPATIENT
Start: 2025-06-19 | End: 2025-06-19 | Stop reason: HOSPADM

## 2025-06-19 RX ORDER — FEXOFENADINE HCL 60 MG/1
60 TABLET, FILM COATED ORAL
COMMUNITY

## 2025-06-19 RX ORDER — HYDROMORPHONE HYDROCHLORIDE 1 MG/ML
0.4 INJECTION, SOLUTION INTRAMUSCULAR; INTRAVENOUS; SUBCUTANEOUS
Status: DISCONTINUED | OUTPATIENT
Start: 2025-06-19 | End: 2025-06-19 | Stop reason: HOSPADM

## 2025-06-19 RX ORDER — EPHEDRINE SULFATE 50 MG/ML
5 INJECTION, SOLUTION INTRAVENOUS
Status: DISCONTINUED | OUTPATIENT
Start: 2025-06-19 | End: 2025-06-19 | Stop reason: HOSPADM

## 2025-06-19 RX ORDER — KETOROLAC TROMETHAMINE 15 MG/ML
INJECTION, SOLUTION INTRAMUSCULAR; INTRAVENOUS PRN
Status: DISCONTINUED | OUTPATIENT
Start: 2025-06-19 | End: 2025-06-19 | Stop reason: SURG

## 2025-06-19 RX ORDER — CEFAZOLIN SODIUM 1 G/3ML
INJECTION, POWDER, FOR SOLUTION INTRAMUSCULAR; INTRAVENOUS PRN
Status: DISCONTINUED | OUTPATIENT
Start: 2025-06-19 | End: 2025-06-19 | Stop reason: SURG

## 2025-06-19 RX ORDER — IBUPROFEN 600 MG/1
600 TABLET, FILM COATED ORAL EVERY 6 HOURS
Qty: 45 TABLET | Refills: 0 | Status: SHIPPED | OUTPATIENT
Start: 2025-06-19

## 2025-06-19 RX ORDER — HALOPERIDOL 5 MG/ML
1 INJECTION INTRAMUSCULAR
Status: DISCONTINUED | OUTPATIENT
Start: 2025-06-19 | End: 2025-06-19 | Stop reason: HOSPADM

## 2025-06-19 RX ORDER — ACETAMINOPHEN 325 MG/1
650 TABLET ORAL EVERY 6 HOURS
Qty: 60 TABLET | Refills: 0 | Status: SHIPPED | OUTPATIENT
Start: 2025-06-19

## 2025-06-19 RX ORDER — HYDROMORPHONE HYDROCHLORIDE 1 MG/ML
0.1 INJECTION, SOLUTION INTRAMUSCULAR; INTRAVENOUS; SUBCUTANEOUS
Status: DISCONTINUED | OUTPATIENT
Start: 2025-06-19 | End: 2025-06-19 | Stop reason: HOSPADM

## 2025-06-19 RX ORDER — ALBUTEROL SULFATE 5 MG/ML
2.5 SOLUTION RESPIRATORY (INHALATION)
Status: DISCONTINUED | OUTPATIENT
Start: 2025-06-19 | End: 2025-06-19 | Stop reason: HOSPADM

## 2025-06-19 RX ADMIN — SUGAMMADEX 200 MG: 100 INJECTION, SOLUTION INTRAVENOUS at 07:52

## 2025-06-19 RX ADMIN — ONDANSETRON 4 MG: 2 INJECTION INTRAMUSCULAR; INTRAVENOUS at 07:33

## 2025-06-19 RX ADMIN — MIDAZOLAM HYDROCHLORIDE 2 MG: 1 INJECTION, SOLUTION INTRAMUSCULAR; INTRAVENOUS at 07:27

## 2025-06-19 RX ADMIN — DEXAMETHASONE SODIUM PHOSPHATE 4 MG: 4 INJECTION INTRA-ARTICULAR; INTRALESIONAL; INTRAMUSCULAR; INTRAVENOUS; SOFT TISSUE at 07:33

## 2025-06-19 RX ADMIN — CEFAZOLIN 2 G: 1 INJECTION, POWDER, FOR SOLUTION INTRAMUSCULAR; INTRAVENOUS at 07:27

## 2025-06-19 RX ADMIN — LIDOCAINE HYDROCHLORIDE 0.5 ML: 10 INJECTION, SOLUTION EPIDURAL; INFILTRATION; INTRACAUDAL; PERINEURAL at 06:44

## 2025-06-19 RX ADMIN — ROCURONIUM BROMIDE 50 MG: 10 INJECTION INTRAVENOUS at 07:32

## 2025-06-19 RX ADMIN — OXYCODONE HYDROCHLORIDE 10 MG: 5 SOLUTION ORAL at 08:12

## 2025-06-19 RX ADMIN — LIDOCAINE HYDROCHLORIDE 50 MG: 20 INJECTION, SOLUTION EPIDURAL; INFILTRATION; INTRACAUDAL; PERINEURAL at 07:32

## 2025-06-19 RX ADMIN — SODIUM CHLORIDE, POTASSIUM CHLORIDE, SODIUM LACTATE AND CALCIUM CHLORIDE: 600; 310; 30; 20 INJECTION, SOLUTION INTRAVENOUS at 06:45

## 2025-06-19 RX ADMIN — MEPERIDINE HYDROCHLORIDE 6.5 MG: 50 INJECTION INTRAMUSCULAR; INTRAVENOUS; SUBCUTANEOUS at 08:36

## 2025-06-19 RX ADMIN — PROPOFOL 200 MG: 10 INJECTION, EMULSION INTRAVENOUS at 07:32

## 2025-06-19 RX ADMIN — KETOROLAC TROMETHAMINE 15 MG: 15 INJECTION, SOLUTION INTRAMUSCULAR; INTRAVENOUS at 07:52

## 2025-06-19 RX ADMIN — FENTANYL CITRATE 50 MCG: 50 INJECTION, SOLUTION INTRAMUSCULAR; INTRAVENOUS at 07:55

## 2025-06-19 RX ADMIN — FENTANYL CITRATE 100 MCG: 50 INJECTION, SOLUTION INTRAMUSCULAR; INTRAVENOUS at 07:32

## 2025-06-19 RX ADMIN — FENTANYL CITRATE 50 MCG: 50 INJECTION, SOLUTION INTRAMUSCULAR; INTRAVENOUS at 07:50

## 2025-06-19 ASSESSMENT — PAIN SCALES - GENERAL: PAIN_LEVEL: 5

## 2025-06-19 ASSESSMENT — FIBROSIS 4 INDEX
FIB4 SCORE: 1.09
FIB4 SCORE: 1.09

## 2025-06-19 ASSESSMENT — PAIN DESCRIPTION - PAIN TYPE
TYPE: SURGICAL PAIN

## 2025-06-19 NOTE — ANESTHESIA TIME REPORT
Anesthesia Start and Stop Event Times       Date Time Event    6/19/2025 0710 Ready for Procedure     0727 Anesthesia Start     0808 Anesthesia Stop          Responsible Staff  06/19/25      Name Role Begin End    Gelacio Wright M.D. Anesth 0727 0808          Overtime Reason:  no overtime (within assigned shift)    Comments:

## 2025-06-19 NOTE — ANESTHESIA PREPROCEDURE EVALUATION
Case: [id number] Date/Time: 06/19/25 0715    Procedure: LAPAROSCOPIC CHOLECYSTECTOMY. (Abdomen)    Anesthesia type: General    Pre-op diagnosis: CHOLELITHIASIS WITHOUT OBSTRUCTION    Location: [facility] OR  / SURGERY [facility]    Surgeons: Napoleon Morris M.D.            Relevant Problems   ANESTHESIA (within normal limits)      PULMONARY (within normal limits)      NEURO (within normal limits)      CARDIAC (within normal limits)      GI (within normal limits)       (within normal limits)      ENDO (within normal limits)      Other   (positive) Alcohol abuse       Physical Exam    Airway   Mallampati: II  TM distance: >3 FB  Neck ROM: full       Cardiovascular - normal exam  Rhythm: regular  Rate: normal    (-) murmur     Dental - normal exam           Pulmonary - normal examBreath sounds clear to auscultation     Abdominal    Neurological - normal exam                   Anesthesia Plan    ASA 3   ASA physical status 3 criteria: alcohol and/or substance dependence or abuse    Plan - general       Airway plan will be ETT          Induction: intravenous    Postoperative Plan: Postoperative administration of opioids is intended.    Pertinent diagnostic labs and testing reviewed    Informed Consent:    Anesthetic plan and risks discussed with patient.    Use of blood products discussed with: patient whom consented to blood products.

## 2025-06-19 NOTE — OP REPORT
Operative Report    Date: 6/19/2025    Surgeon: Napoleon Morris M.D.     Assistant: None    Pre-operative Diagnosis: Symptomatic Cholelithiasis    Post-operative Diagnosis: same    ASA Classification: III.    Procedure: Laparoscopic cholecystectomy    Indications: This is a 40 y.o. female who presented with symptoms of Symptomatic Cholelithiasis here for cholecystectomy.      The indications for a surgical assistant in this surgery were indicated due to complexity of the procedure. Their role included aiding in incision, retraction, holding devices including camera for laparoscopic procedure, and closure of the wound.      Wound Classification: Class II, II, Clean Contaminated..    Findings: Critical view of safety obtained     Procedure in detail: The patient was seen and examined in the preoperative holding area.  The risks benefits and alternatives of the procedure were discussed with the patient who wished to proceed with the procedure as described.  The patient was transferred to the operating room placed in supine position and all pressure points were properly padded.  General endotracheal anesthesia was induced and preoperative antibiotics were given per SCIP protocol.  Patient's abdomen was prepped with ChloraPrep and draped in the normal sterile fashion.  A timeout was performed confirming correct patient, correct procedure, and that all necessary equipment was in the room.      After infiltration of local anesthetic, an incision was made in the supraumbilical position.  A combination of blunt and electrocautery dissection was used down to the fascia.  The umbilical stalk was grasped elevated and the fascia was sharply incised.  A figure-of-eight 0 vicryl suture was placed across the fascial opening.  The Beauchamp port was introduced and pneumoperitoneum was achieved and maintained at 15 mmHg carbon dioxide throughout the entirety of the case.   The 5 mm camera was introduced and the abdomen was inspected  and no underlying trauma was identified from abdominal entry. After infusing local anesthetic under direct visualization two 5mm right upper quadrant port and 5mm epigastric port were placed.    The gallbladder was identified in the right upper quadrant.  It was grasped and retracted cephalad and laterally respectively.  A combination of blunt and electrocautery dissection were used to dissect the overlaying tissue free from around the cystic duct and cystic artery.  Dissection was continued until the cystic duct and cystic artery free and there is nothing but liver parenchyma behind.  This confirmed the critical view of safety. The cystic duct and cystic artery were double clipped on the patient's side single clipped on the specimen side and transected. The gallbladder was removed from the cystic plate using electrocautery.  I inspected the cystic plate and hemostasis was obtained. The gallbladder was placed in a 10 mm Endo Catch bag through the umbilical port port and removed.    All ports were removed with video assist, and were hemostatic. The previously placed vicryl suture was tied. All skin sites were closed with subcuticular Monocryl and Dermabond was placed over the wounds.    The patient was awakened from general anesthetic, and was taken to the recovery room in stable condition.    Sponge and needle counts were correct at the end of the case.     Specimen: gallbladder and content for permanent pathology    EBL: 5mL    Dispo: stable, extubated, to PACU    Napoleon Morris M.D.  [facility]  [phone]

## 2025-06-19 NOTE — ANESTHESIA PROCEDURE NOTES
Airway    Date/Time: 6/19/2025 7:32 AM    Performed by: Gelacio Wright M.D.  Authorized by: Gelacio Wright M.D.    Location:  OR  Urgency:  Elective  Indications for Airway Management:  Anesthesia      Spontaneous Ventilation: absent    Sedation Level:  Deep  Preoxygenated: Yes    Patient Position:  Sniffing  Mask Difficulty Assessment:  0 - not attempted  Final Airway Type:  Endotracheal airway  Final Endotracheal Airway:  ETT  Cuffed: Yes    Technique Used for Successful ETT Placement:  Direct laryngoscopy  Devices/Methods Used in Placement:  Cricoid pressure    Insertion Site:  Oral  Blade Type:  Ricardo  Laryngoscope Blade/Videolaryngoscope Blade Size:  3  ETT Size (mm):  7.0  Measured from:  Teeth  ETT to Teeth (cm):  22  Placement Verified by: auscultation and capnometry    Cormack-Lehane Classification:  Grade I - full view of glottis  Number of Attempts at Approach:  1

## 2025-06-19 NOTE — ANESTHESIA POSTPROCEDURE EVALUATION
Patient: Karin Manzo    Procedure Summary       Date: 06/19/25 Room / Location: [place] [address] / SURGERY [facility]    Anesthesia Start: 0727 Anesthesia Stop: 0808    Procedure: LAPAROSCOPIC CHOLECYSTECTOMY. (Abdomen) Diagnosis: (CHOLELITHIASIS)    Surgeons: Napoleon Morris M.D. Responsible Provider: Gelacio Wright M.D.    Anesthesia Type: general ASA Status: 3            Final Anesthesia Type: general  Last vitals  BP   Blood Pressure: 124/68    Temp   36 °C (96.8 °F)    Pulse   83   Resp   16    SpO2   96 %      Anesthesia Post Evaluation    Patient location during evaluation: PACU  Patient participation: complete - patient participated  Level of consciousness: awake and alert  Pain score: 5    Airway patency: patent  Anesthetic complications: no  Cardiovascular status: hemodynamically stable  Respiratory status: acceptable  Hydration status: euvolemic    PONV: none          There were no known notable events for this encounter.

## 2025-06-19 NOTE — PROGRESS NOTES
Pharmacy Medication Reconciliation      ~Medication reconciliation updated and complete per patient   ~Allergies have been verified and updated   ~No oral ABX within the last 30 days  ~Is dispense history available in EPIC: no  ~Patient home pharmacy :  [facility] [phone]      ~Anticoagulants (rivaroxaban, apixaban, edoxaban, dabigatran, warfarin, enoxaparin) taken in the last 14 days? NO

## 2025-06-19 NOTE — DISCHARGE INSTRUCTIONS
Laparoscopic Cholecystectomy D/C instructions:    DIET: Upon discharge from the hospital you may resume your normal preoperative diet. Depending on how you are feeling and whether you have nausea or not, you may wish to stay with a bland diet for the first few days. However, you can advance this as quickly as you feel ready.    ACTIVITIES: After discharge from the hospital, you may resume full routine activities. However, there should be no heavy lifting (greater than 15 pounds) and no strenuous activities until after your follow-up visit. Otherwise, routine activities of daily living are acceptable.    DRIVING: You may drive whenever you are off pain medications and are able to perform the activities needed to drive, i.e. turning, bending, twisting, etc.    BATHING: You may get the wound wet at any time after leaving the hospital. You may shower, but do not submerge in a bath for at least a week. Dressings may come off after 48 hours.    BOWEL FUNCTION: A few patients, after this operation, will develop either frequent or loose stools after meals. This usually corrects itself after a few days, to a few weeks. If this occurs, do not worry; it is not unusual and will resolve. Much more common than loose stools, is constipation. The combination of pain medication and decreased activity level can cause constipation in otherwise normal patients. If you feel this is occurring, take a laxative (Milk of Magnesia, Ex-Lax, Senokot, etc.) until the problem has resolved.    PAIN MEDICATION: You will be given a prescription for pain medication at discharge. Please take these as directed. It is important to remember not to take medications on an empty stomach as this may cause nausea.    CALL IF YOU HAVE: (1) Fevers to more than 1010 F, (2) Unusual chest or leg pain, (3) Drainage or fluid from incision that may be foul smelling, increased tenderness or soreness at the wound or the wound edges are no longer together, redness or  swelling at the incision site. Please do not hesitate to call with any other questions.     APPOINTMENT: Contact our office at [phone] for a follow-up appointment in 1 to 2 weeks following your procedure.    If you have any additional questions, please do not hesitate to call the office.    Office address:  [address], [address] [place] [address]   HOME CARE INSTRUCTIONS    Prescription given for acetaminophen, ibuprofen, oxyCODONE immediate-release, & polyethylene glycol/lytes at [facility] .  Last pain medication given at 8:12 AM - Oxycodone    A follow-up appointment should be arranged with your doctor in 1-2 weeks; call to schedule.    You should CALL YOUR PHYSICIAN if you develop:  Fever greater than 101 degrees F.  Pain not relieved by medication, or persistent nausea or vomiting.  Excessive bleeding (blood soaking through dressing) or unexpected drainage from the wound.  Extreme redness or swelling around the incision site, drainage of pus or foul smelling drainage.  Inability to urinate or empty your bladder within 8 hours.  Problems with breathing or chest pain.    You should call 911 if you develop problems with breathing or chest pain.  If you are unable to contact your doctor or surgical center, you should go to the nearest emergency room or urgent care center.  Physician's telephone #: [phone] Dr Morris    MILD FLU-LIKE SYMPTOMS ARE NORMAL.  YOU MAY EXPERIENCE GENERALIZED MUSCLE ACHES, THROAT IRRITATION, HEADACHE AND/OR SOME NAUSEA.    If any questions arise, call your doctor.  If your doctor is not available, please feel free to call the Surgical Center at [phone].  The Center is open Monday through Friday from 7AM to 7PM.      A registered nurse may call you a few days after your surgery to see how you are doing after your procedure.    You may also receive a survey in the mail within the next two weeks and we ask that you take a few moments to complete the survey and return it  to us.  Our goal is to provide you with very good care and we value your comments.     Depression / Suicide Risk    As you are discharged from this [facility] facility, it is important to learn how to keep safe from harming yourself.    Recognize the warning signs:  Abrupt changes in personality, positive or negative- including increase in energy   Giving away possessions  Change in eating patterns- significant weight changes-  positive or negative  Change in sleeping patterns- unable to sleep or sleeping all the time   Unwillingness or inability to communicate  Depression  Unusual sadness, discouragement and loneliness  Talk of wanting to die  Neglect of personal appearance   Rebelliousness- reckless behavior  Withdrawal from people/activities they love  Confusion- inability to concentrate     If you or a loved one observes any of these behaviors or has concerns about self-harm, here's what you can do:  Talk about it- your feelings and reasons for harming yourself  Remove any means that you might use to hurt yourself (examples: pills, rope, extension cords, firearm)  Get professional help from the community (Mental Health, Substance Abuse, psychological counseling)  Do not be alone:Call your Safe Contact- someone whom you trust who will be there for you.  Call your local CRISIS HOTLINE 192-7219 or 011-889-9416  Call your local Children's Mobile Crisis Response Team [place] (647) 787-2814 or www.[employer]  Call the toll free National Suicide Prevention Hotlines   National Suicide Prevention Lifeline 326-260-HONO (5605)  National Hope Line Network 800-SUICIDE (938-8886)    I acknowledge receipt and understanding of these Home Care instructions.

## 2025-06-19 NOTE — OR NURSING
Report to Rajani LARRY. Plan of care discussed. Patient reports tolerable 4/10 soreness to abdomen. No complaints of nausea. Tolerating juice and otter pop without difficulty. Lap stabs CDI with ice in place. VSS. Patient expresses readiness for discharge.

## 2025-06-23 LAB — PATHOLOGY CONSULT NOTE: NORMAL

## (undated) DEVICE — PAD LAP STERILE 18 X 18 - (5/PK 40PK/CA)

## (undated) DEVICE — SLEEVE VASO DVT COMPRESSION CALF MED - (10PR/CA)

## (undated) DEVICE — SUTURE GENERAL

## (undated) DEVICE — DERMABOND ADVANCED - (12EA/BX)

## (undated) DEVICE — SUTURE 4-0 VICRYL PLUSFS-1 - 27 INCH (36/BX)

## (undated) DEVICE — LACTATED RINGERS INJ 1000 ML - (14EA/CA 60CA/PF)

## (undated) DEVICE — PAD SANITARY 11IN MAXI IND WRAPPED  (12EA/PK 24PK/CA)

## (undated) DEVICE — CANNULA W/SEAL 5X100 Z-THRE - ADED KII (12/BX)

## (undated) DEVICE — TUBING CLEARLINK DUO-VENT - C-FLO (48EA/CA)

## (undated) DEVICE — NEPTUNE 4 PORT MANIFOLD - (20/PK)

## (undated) DEVICE — DRESSING NON-ADHERING 8 X 3 - (50/BX)

## (undated) DEVICE — ELECTRODE DUAL RETURN W/ CORD - (50/PK)

## (undated) DEVICE — SCISSORS 5MM CVD (6EA/BX)

## (undated) DEVICE — SUTURE 0 VICRYL PLUS CT-2 - 27 INCH (36/BX)

## (undated) DEVICE — GOWN WARMING STANDARD FLEX - (30/CA)

## (undated) DEVICE — PACK MAJOR BASIN - (2EA/CA)

## (undated) DEVICE — BLADE SURGICAL #15 - (50/BX 3BX/CA)

## (undated) DEVICE — SODIUM CHL IRRIGATION 0.9% 1000ML (12EA/CA)

## (undated) DEVICE — SET EXTENSION WITH 2 PORTS (48EA/CA) ***PART #2C8610 IS A SUBSTITUTE*****

## (undated) DEVICE — COVER LIGHT HANDLE ALC PLUS DISP (18EA/BX)

## (undated) DEVICE — SET LEADWIRE 5 LEAD BEDSIDE DISPOSABLE ECG (1SET OF 5/EA)

## (undated) DEVICE — SPONGE GAUZESTER 4 X 4 4PLY - (128PK/CA)

## (undated) DEVICE — SUTURE 0 COATED VICRYL 6-18IN - (12PK/BX)

## (undated) DEVICE — SYRINGE 50ML CATHETER TIP (40EA/BX 4BX/CA)

## (undated) DEVICE — TROCAR 5X100 NON BLADED Z-TH - READ KII (6/BX)

## (undated) DEVICE — SYSTEM CLEARIFY VISUALIZATION (10EA/PK)

## (undated) DEVICE — LEAD SET 6 DISP. EKG NIHON KOHDEN (100EA/CA) [9859].

## (undated) DEVICE — TOWELS CLOTH SURGICAL - (4/PK 20PK/CA)

## (undated) DEVICE — SUCTION INSTRUMENT YANKAUER BULBOUS TIP W/O VENT (50EA/CA)

## (undated) DEVICE — MASK ANESTHESIA ADULT  - (100/CA)

## (undated) DEVICE — SENSOR SPO2 NEO LNCS ADHESIVE (20/BX) SEE USER NOTES

## (undated) DEVICE — DETERGENT RENUZYME PLUS 10 OZ PACKET (50/BX)

## (undated) DEVICE — CANISTER SUCTION 3000ML MECHANICAL FILTER AUTO SHUTOFF MEDI-VAC NONSTERILE LF DISP (40EA/CA)

## (undated) DEVICE — DRAPE LAPAROTOMY T SHEET - (12EA/CA)

## (undated) DEVICE — TROCAR LAPSCP 100MM 12MM NTHRD - (6/BX)

## (undated) DEVICE — TRAY SRGPRP PVP IOD WT PRP - (20/CA)

## (undated) DEVICE — GLOVE BIOGEL PI INDICATOR SZ 8.0 SURGICAL PF LF -(50/BX 4BX/CA)

## (undated) DEVICE — SUTURE 0 VICRYL PLUS UR-6 - 27 INCH (36/BX)

## (undated) DEVICE — DRESSING TRANSPARENT FILM TEGADERM 4 X 4.75" (50EA/BX)"

## (undated) DEVICE — HEMOSTAT ABSORBABLE POWDER SURGICEL 3G (5EA/BX)

## (undated) DEVICE — SUTURE 4-0 MONOCRYL PLUS PS-2 - 27 INCH (36/BX)

## (undated) DEVICE — GLOVE BIOGEL INDICATOR SZ 7.5 SURGICAL PF LTX - (50PR/BX 4BX/CA)

## (undated) DEVICE — CANISTER SUCTION 3000ML MECHANICAL FILTER AUTO SHUTOFF MEDI-VAC NONSTERILE LF DISP  (40EA/CA)

## (undated) DEVICE — APPLICATOR ENDOSCOPIC SURGICEL (5EA/BX)

## (undated) DEVICE — PROTECTOR ULNA NERVE - (36PR/CA)

## (undated) DEVICE — SUTURE 2-0 COATED VICRYL PLUS - 12 X 18 INCH (12/BX)

## (undated) DEVICE — GLOVESZ 8.5 BIOGEL PI MICRO - PF LF (50PR/BX)

## (undated) DEVICE — KIT ANESTHESIA W/CIRCUIT & 3/LT BAG W/FILTER (20EA/CA)

## (undated) DEVICE — GLOVE BIOGEL SZ 7.5 SURGICAL PF LTX - (50PR/BX 4BX/CA)

## (undated) DEVICE — Device

## (undated) DEVICE — CHLORAPREP 26 ML APPLICATOR - ORANGE TINT(25/CA)

## (undated) DEVICE — SUTURE 2-0 VICRYL PLUS CT-2 - 27 INCH (36/BX)

## (undated) DEVICE — GLOVE BIOGEL ECLIPSE  PF LATEX SIZE 6.5 (50PR/BX)

## (undated) DEVICE — KIT ROOM DECONTAMINATION

## (undated) DEVICE — GLOVE BIOGEL SZ 6.5 SURGICAL PF LTX (50PR/BX 4BX/CA)

## (undated) DEVICE — GLOVE SZ 7.5 BIOGEL PI MICRO - PF LF (50PR/BX)

## (undated) DEVICE — HEAD HOLDER JUNIOR/ADULT

## (undated) DEVICE — ELECTRODE 850 FOAM ADHESIVE - HYDROGEL RADIOTRNSPRNT (50/PK)

## (undated) DEVICE — GLOVE BIOGEL INDICATOR SZ 8 SURGICAL PF LTX - (50/BX 4BX/CA)

## (undated) DEVICE — SENSOR OXIMETER ADULT SPO2 RD SET (20EA/BX)

## (undated) DEVICE — SUTURE 0 VICRYL PLUS CT-1 - 36 INCH (36/BX)

## (undated) DEVICE — TUBE E-T HI-LO CUFF 6.5MM (10EA/BX)